# Patient Record
Sex: FEMALE | Race: BLACK OR AFRICAN AMERICAN | NOT HISPANIC OR LATINO
[De-identification: names, ages, dates, MRNs, and addresses within clinical notes are randomized per-mention and may not be internally consistent; named-entity substitution may affect disease eponyms.]

---

## 2017-09-26 ENCOUNTER — OTHER (OUTPATIENT)
Age: 62
End: 2017-09-26

## 2017-09-27 ENCOUNTER — EMERGENCY (EMERGENCY)
Facility: HOSPITAL | Age: 62
LOS: 0 days | Discharge: HOME | End: 2017-09-27
Attending: EMERGENCY MEDICINE
Payer: COMMERCIAL

## 2017-09-27 VITALS
RESPIRATION RATE: 16 BRPM | DIASTOLIC BLOOD PRESSURE: 80 MMHG | HEART RATE: 104 BPM | HEIGHT: 65 IN | WEIGHT: 179.02 LBS | TEMPERATURE: 98 F | SYSTOLIC BLOOD PRESSURE: 141 MMHG | OXYGEN SATURATION: 98 %

## 2017-09-27 DIAGNOSIS — Z98.89 OTHER SPECIFIED POSTPROCEDURAL STATES: Chronic | ICD-10-CM

## 2017-09-27 DIAGNOSIS — Z90.710 ACQUIRED ABSENCE OF BOTH CERVIX AND UTERUS: Chronic | ICD-10-CM

## 2017-09-27 PROCEDURE — 99283 EMERGENCY DEPT VISIT LOW MDM: CPT

## 2017-09-27 PROCEDURE — 73130 X-RAY EXAM OF HAND: CPT | Mod: 26,LT

## 2017-09-27 RX ORDER — IBUPROFEN 200 MG
600 TABLET ORAL ONCE
Qty: 0 | Refills: 0 | Status: COMPLETED | OUTPATIENT
Start: 2017-09-27 | End: 2017-09-27

## 2017-09-27 RX ORDER — IBUPROFEN 200 MG
1 TABLET ORAL
Qty: 20 | Refills: 0
Start: 2017-09-27 | End: 2017-10-02

## 2017-09-27 RX ADMIN — Medication 600 MILLIGRAM(S): at 19:38

## 2017-09-27 NOTE — ED PROVIDER NOTE - MUSCULOSKELETAL, MLM
+left dorsal hand tenderness and edema overlying 4th and 5th metatcarpals, good ROM of wrist, no snuffbox tenderness

## 2017-09-27 NOTE — ED PROVIDER NOTE - MEDICAL DECISION MAKING DETAILS
Patient presents with left hand pain after injury today, significant edema on exam, plan for xray, nsaids

## 2017-09-27 NOTE — ED ADULT NURSE NOTE - OBJECTIVE STATEMENT
61 y o female c/o L hand pain/injury c swelling related to s/p fall today, "I was running for the bus and fell this morning.: denies head loc.

## 2017-09-27 NOTE — ED PROVIDER NOTE - OBJECTIVE STATEMENT
61F here with left hand injury sustained after a fall on outstretched hand today, now with swelling and pain overlying 4th and 5th metacarpals. No numbness or tingling. No weakness. No head injury or other injury. No wrist pain. She is right handed.

## 2017-09-27 NOTE — ED PROVIDER NOTE - ATTENDING CONTRIBUTION TO CARE
Patient evaluated and seen with PA agree with above history and physical - pt examined and seen by me personally - findings as seen: pt with left hand 4th and 5th MCP area swelling with tenderness, s/p fall placed in Volar splint after noted normal XR but with tenderness to area with swelling. To follow up hand surgery.

## 2017-09-28 ENCOUNTER — APPOINTMENT (OUTPATIENT)
Dept: PULMONOLOGY | Facility: CLINIC | Age: 62
End: 2017-09-28
Payer: COMMERCIAL

## 2017-09-28 VITALS
RESPIRATION RATE: 16 BRPM | HEART RATE: 100 BPM | HEIGHT: 63 IN | OXYGEN SATURATION: 96 % | BODY MASS INDEX: 30.83 KG/M2 | DIASTOLIC BLOOD PRESSURE: 70 MMHG | WEIGHT: 174 LBS | SYSTOLIC BLOOD PRESSURE: 120 MMHG | TEMPERATURE: 97.9 F

## 2017-09-28 DIAGNOSIS — Z79.1 LONG TERM (CURRENT) USE OF NON-STEROIDAL ANTI-INFLAMMATORIES (NSAID): ICD-10-CM
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
## 2017-09-28 DIAGNOSIS — B20 HUMAN IMMUNODEFICIENCY VIRUS [HIV] DISEASE: ICD-10-CM

## 2017-09-28 DIAGNOSIS — S60.922A UNSPECIFIED SUPERFICIAL INJURY OF LEFT HAND, INITIAL ENCOUNTER: ICD-10-CM

## 2017-09-28 DIAGNOSIS — M79.642 PAIN IN LEFT HAND: ICD-10-CM

## 2017-09-28 DIAGNOSIS — Z85.3 PERSONAL HISTORY OF MALIGNANT NEOPLASM OF BREAST: ICD-10-CM
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
## 2017-09-28 PROCEDURE — 94729 DIFFUSING CAPACITY: CPT

## 2017-09-28 PROCEDURE — 99203 OFFICE O/P NEW LOW 30 MIN: CPT | Mod: 25

## 2017-09-28 PROCEDURE — ZZZZZ: CPT

## 2017-09-28 PROCEDURE — 94726 PLETHYSMOGRAPHY LUNG VOLUMES: CPT

## 2017-09-28 PROCEDURE — 94060 EVALUATION OF WHEEZING: CPT

## 2017-09-28 RX ORDER — VALSARTAN 40 MG/1
TABLET, COATED ORAL
Refills: 0 | Status: ACTIVE | COMMUNITY

## 2017-09-28 RX ORDER — OMEPRAZOLE 20 MG/1
20 CAPSULE, DELAYED RELEASE ORAL DAILY
Qty: 30 | Refills: 2 | Status: DISCONTINUED | COMMUNITY
Start: 2017-09-28 | End: 2017-09-28

## 2017-09-28 RX ORDER — HYDROCHLOROTHIAZIDE 12.5 MG/1
TABLET ORAL
Refills: 0 | Status: ACTIVE | COMMUNITY

## 2017-09-28 RX ORDER — EMTRICITABINE, RILPIVIRINE HYDROCHLORIDE, AND TENOFOVIR ALAFENAMIDE 200; 25; 25 MG/1; MG/1; MG/1
TABLET ORAL
Refills: 0 | Status: ACTIVE | COMMUNITY

## 2017-11-09 ENCOUNTER — APPOINTMENT (OUTPATIENT)
Dept: PULMONOLOGY | Facility: CLINIC | Age: 62
End: 2017-11-09
Payer: COMMERCIAL

## 2017-11-09 VITALS
WEIGHT: 174 LBS | OXYGEN SATURATION: 93 % | HEART RATE: 95 BPM | BODY MASS INDEX: 29.71 KG/M2 | TEMPERATURE: 98 F | RESPIRATION RATE: 16 BRPM | SYSTOLIC BLOOD PRESSURE: 122 MMHG | DIASTOLIC BLOOD PRESSURE: 70 MMHG | HEIGHT: 64 IN

## 2017-11-09 PROCEDURE — 99213 OFFICE O/P EST LOW 20 MIN: CPT

## 2017-11-15 ENCOUNTER — RX RENEWAL (OUTPATIENT)
Age: 62
End: 2017-11-15

## 2017-11-16 ENCOUNTER — APPOINTMENT (OUTPATIENT)
Dept: PULMONOLOGY | Facility: CLINIC | Age: 62
End: 2017-11-16
Payer: COMMERCIAL

## 2017-11-16 ENCOUNTER — MEDICATION RENEWAL (OUTPATIENT)
Age: 62
End: 2017-11-16

## 2017-11-16 VITALS
TEMPERATURE: 97.7 F | SYSTOLIC BLOOD PRESSURE: 120 MMHG | HEART RATE: 94 BPM | RESPIRATION RATE: 18 BRPM | HEIGHT: 64 IN | OXYGEN SATURATION: 98 % | WEIGHT: 181 LBS | DIASTOLIC BLOOD PRESSURE: 80 MMHG | BODY MASS INDEX: 30.9 KG/M2

## 2017-11-16 PROCEDURE — 99213 OFFICE O/P EST LOW 20 MIN: CPT

## 2017-11-20 ENCOUNTER — MEDICATION RENEWAL (OUTPATIENT)
Age: 62
End: 2017-11-20

## 2017-12-13 ENCOUNTER — MEDICATION RENEWAL (OUTPATIENT)
Age: 62
End: 2017-12-13

## 2018-01-09 ENCOUNTER — MEDICATION RENEWAL (OUTPATIENT)
Age: 63
End: 2018-01-09

## 2018-01-09 RX ORDER — FLUTICASONE PROPIONATE 50 UG/1
50 SPRAY, METERED NASAL TWICE DAILY
Qty: 1 | Refills: 3 | Status: DISCONTINUED | COMMUNITY
Start: 2017-09-28 | End: 2018-01-09

## 2018-02-08 ENCOUNTER — APPOINTMENT (OUTPATIENT)
Dept: PULMONOLOGY | Facility: CLINIC | Age: 63
End: 2018-02-08
Payer: COMMERCIAL

## 2018-02-08 ENCOUNTER — LABORATORY RESULT (OUTPATIENT)
Age: 63
End: 2018-02-08

## 2018-02-08 VITALS
HEART RATE: 103 BPM | SYSTOLIC BLOOD PRESSURE: 110 MMHG | WEIGHT: 178 LBS | DIASTOLIC BLOOD PRESSURE: 70 MMHG | BODY MASS INDEX: 30.39 KG/M2 | RESPIRATION RATE: 16 BRPM | TEMPERATURE: 97.4 F | OXYGEN SATURATION: 96 % | HEIGHT: 64 IN

## 2018-02-08 PROCEDURE — 99214 OFFICE O/P EST MOD 30 MIN: CPT

## 2018-02-09 LAB
BASOPHILS # BLD AUTO: 0.02 K/UL
BASOPHILS NFR BLD AUTO: 0.3 %
EOSINOPHIL # BLD AUTO: 0.03 K/UL
EOSINOPHIL NFR BLD AUTO: 0.4 %
HCT VFR BLD CALC: 39.2 %
HGB BLD-MCNC: 13.1 G/DL
IGE SER-MCNC: 127 IU/ML
IMM GRANULOCYTES NFR BLD AUTO: 0.3 %
LYMPHOCYTES # BLD AUTO: 1.09 K/UL
LYMPHOCYTES NFR BLD AUTO: 16.2 %
MAN DIFF?: NORMAL
MCHC RBC-ENTMCNC: 31 PG
MCHC RBC-ENTMCNC: 33.4 GM/DL
MCV RBC AUTO: 92.9 FL
MONOCYTES # BLD AUTO: 0.38 K/UL
MONOCYTES NFR BLD AUTO: 5.7 %
NEUTROPHILS # BLD AUTO: 5.18 K/UL
NEUTROPHILS NFR BLD AUTO: 77.1 %
PLATELET # BLD AUTO: 323 K/UL
RBC # BLD: 4.22 M/UL
RBC # FLD: 16.2 %
WBC # FLD AUTO: 6.72 K/UL

## 2018-02-14 LAB
A ALTERNATA IGE QN: <0.1 KUA/L
A FUMIGATUS IGE QN: <0.1 KUA/L
BERMUDA GRASS IGE QN: <0.1 KUA/L
BOXELDER IGE QN: 0.57 KUA/L
C HERBARUM IGE QN: <0.1 KUA/L
CALIF WALNUT IGE QN: <0.1 KUA/L
CAT DANDER IGE QN: <0.1 KUA/L
CMN PIGWEED IGE QN: <0.1 KUA/L
COMMON RAGWEED IGE QN: <0.1 KUA/L
COTTONWOOD IGE QN: <0.1 KUA/L
D FARINAE IGE QN: 0.98 KUA/L
D PTERONYSS IGE QN: 0.51 KUA/L
DEPRECATED A ALTERNATA IGE RAST QL: 0
DEPRECATED A FUMIGATUS IGE RAST QL: 0
DEPRECATED BERMUDA GRASS IGE RAST QL: 0
DEPRECATED BOXELDER IGE RAST QL: ABNORMAL
DEPRECATED C HERBARUM IGE RAST QL: 0
DEPRECATED CAT DANDER IGE RAST QL: 0
DEPRECATED COMMON PIGWEED IGE RAST QL: 0
DEPRECATED COMMON RAGWEED IGE RAST QL: 0
DEPRECATED COTTONWOOD IGE RAST QL: 0
DEPRECATED D FARINAE IGE RAST QL: ABNORMAL
DEPRECATED D PTERONYSS IGE RAST QL: ABNORMAL
DEPRECATED DOG DANDER IGE RAST QL: 0
DEPRECATED GOOSEFOOT IGE RAST QL: 0
DEPRECATED LONDON PLANE IGE RAST QL: 0
DEPRECATED MUGWORT IGE RAST QL: 0
DEPRECATED P NOTATUM IGE RAST QL: 0
DEPRECATED RED CEDAR IGE RAST QL: 0
DEPRECATED ROACH IGE RAST QL: 0
DEPRECATED SHEEP SORREL IGE RAST QL: 0
DEPRECATED SILVER BIRCH IGE RAST QL: 0
DEPRECATED TIMOTHY IGE RAST QL: 0
DEPRECATED WHITE ASH IGE RAST QL: 0
DEPRECATED WHITE OAK IGE RAST QL: 0
DOG DANDER IGE QN: <0.1 KUA/L
GOOSEFOOT IGE QN: <0.1 KUA/L
LONDON PLANE IGE QN: <0.1 KUA/L
MUGWORT IGE QN: <0.1 KUA/L
MULBERRY (T70) CLASS: 0
MULBERRY (T70) CONC: <0.1 KUA/L
P NOTATUM IGE QN: <0.1 KUA/L
RED CEDAR IGE QN: <0.1 KUA/L
ROACH IGE QN: <0.1 KUA/L
SHEEP SORREL IGE QN: <0.1 KUA/L
SILVER BIRCH IGE QN: <0.1 KUA/L
TIMOTHY IGE QN: <0.1 KUA/L
TREE ALLERG MIX1 IGE QL: 0
WHITE ASH IGE QN: <0.1 KUA/L
WHITE ELM IGE QN: 0
WHITE ELM IGE QN: <0.1 KUA/L
WHITE OAK IGE QN: <0.1 KUA/L

## 2018-03-15 ENCOUNTER — APPOINTMENT (OUTPATIENT)
Dept: PULMONOLOGY | Facility: CLINIC | Age: 63
End: 2018-03-15
Payer: COMMERCIAL

## 2018-03-15 VITALS
RESPIRATION RATE: 16 BRPM | DIASTOLIC BLOOD PRESSURE: 76 MMHG | TEMPERATURE: 99.4 F | SYSTOLIC BLOOD PRESSURE: 120 MMHG | HEIGHT: 64 IN | WEIGHT: 183 LBS | HEART RATE: 96 BPM | BODY MASS INDEX: 31.24 KG/M2

## 2018-03-15 PROCEDURE — 99213 OFFICE O/P EST LOW 20 MIN: CPT

## 2018-03-15 RX ORDER — BECLOMETHASONE DIPROPIONATE 80 UG/1
80 AEROSOL, METERED RESPIRATORY (INHALATION) TWICE DAILY
Qty: 1 | Refills: 6 | Status: ACTIVE | COMMUNITY
Start: 2018-03-15 | End: 1900-01-01

## 2018-04-19 ENCOUNTER — APPOINTMENT (OUTPATIENT)
Dept: PULMONOLOGY | Facility: CLINIC | Age: 63
End: 2018-04-19
Payer: COMMERCIAL

## 2018-04-19 VITALS
SYSTOLIC BLOOD PRESSURE: 116 MMHG | HEART RATE: 77 BPM | DIASTOLIC BLOOD PRESSURE: 70 MMHG | WEIGHT: 181 LBS | RESPIRATION RATE: 16 BRPM | HEIGHT: 64 IN | BODY MASS INDEX: 30.9 KG/M2 | TEMPERATURE: 98.4 F

## 2018-04-19 PROCEDURE — 99213 OFFICE O/P EST LOW 20 MIN: CPT | Mod: GC

## 2018-05-10 ENCOUNTER — MEDICATION RENEWAL (OUTPATIENT)
Age: 63
End: 2018-05-10

## 2018-05-10 ENCOUNTER — RX RENEWAL (OUTPATIENT)
Age: 63
End: 2018-05-10

## 2018-05-24 ENCOUNTER — APPOINTMENT (OUTPATIENT)
Dept: PULMONOLOGY | Facility: CLINIC | Age: 63
End: 2018-05-24
Payer: COMMERCIAL

## 2018-05-24 VITALS
HEIGHT: 64 IN | HEART RATE: 96 BPM | SYSTOLIC BLOOD PRESSURE: 126 MMHG | OXYGEN SATURATION: 95 % | RESPIRATION RATE: 15 BRPM | WEIGHT: 179 LBS | TEMPERATURE: 97.9 F | DIASTOLIC BLOOD PRESSURE: 70 MMHG | BODY MASS INDEX: 30.56 KG/M2

## 2018-05-24 PROCEDURE — 99213 OFFICE O/P EST LOW 20 MIN: CPT

## 2018-08-02 ENCOUNTER — APPOINTMENT (OUTPATIENT)
Dept: PULMONOLOGY | Facility: CLINIC | Age: 63
End: 2018-08-02
Payer: COMMERCIAL

## 2018-08-02 VITALS
TEMPERATURE: 98 F | RESPIRATION RATE: 16 BRPM | DIASTOLIC BLOOD PRESSURE: 70 MMHG | SYSTOLIC BLOOD PRESSURE: 110 MMHG | WEIGHT: 171 LBS | BODY MASS INDEX: 29.19 KG/M2 | HEART RATE: 88 BPM | HEIGHT: 64 IN | OXYGEN SATURATION: 96 %

## 2018-08-02 PROCEDURE — 99214 OFFICE O/P EST MOD 30 MIN: CPT

## 2018-08-02 RX ORDER — PREDNISONE 10 MG/1
10 TABLET ORAL
Qty: 1 | Refills: 1 | Status: DISCONTINUED | COMMUNITY
Start: 2018-02-08 | End: 2018-08-02

## 2018-08-02 RX ORDER — PREDNISONE 10 MG/1
10 TABLET ORAL
Qty: 50 | Refills: 3 | Status: DISCONTINUED | COMMUNITY
Start: 2017-11-09 | End: 2018-08-02

## 2018-08-02 RX ORDER — MOMETASONE 50 UG/1
50 SPRAY, METERED NASAL DAILY
Qty: 1 | Refills: 2 | Status: DISCONTINUED | COMMUNITY
Start: 2018-01-09 | End: 2018-08-02

## 2018-08-03 ENCOUNTER — OTHER (OUTPATIENT)
Age: 63
End: 2018-08-03

## 2018-08-03 LAB
BASOPHILS # BLD AUTO: 0.03 K/UL
BASOPHILS NFR BLD AUTO: 0.4 %
EOSINOPHIL # BLD AUTO: 0.86 K/UL
EOSINOPHIL NFR BLD AUTO: 11.2 %
HCT VFR BLD CALC: 41.2 %
HGB BLD-MCNC: 12.9 G/DL
IGE SER-MCNC: 197 IU/ML
IMM GRANULOCYTES NFR BLD AUTO: 0.3 %
LYMPHOCYTES # BLD AUTO: 1.34 K/UL
LYMPHOCYTES NFR BLD AUTO: 17.4 %
MAN DIFF?: NORMAL
MCHC RBC-ENTMCNC: 29.3 PG
MCHC RBC-ENTMCNC: 31.3 GM/DL
MCV RBC AUTO: 93.6 FL
MONOCYTES # BLD AUTO: 0.76 K/UL
MONOCYTES NFR BLD AUTO: 9.9 %
NEUTROPHILS # BLD AUTO: 4.7 K/UL
NEUTROPHILS NFR BLD AUTO: 60.8 %
PLATELET # BLD AUTO: 326 K/UL
RBC # BLD: 4.4 M/UL
RBC # FLD: 15.2 %
WBC # FLD AUTO: 7.71 K/UL

## 2018-08-16 ENCOUNTER — APPOINTMENT (OUTPATIENT)
Dept: PULMONOLOGY | Facility: CLINIC | Age: 63
End: 2018-08-16

## 2018-08-30 ENCOUNTER — APPOINTMENT (OUTPATIENT)
Dept: PULMONOLOGY | Facility: CLINIC | Age: 63
End: 2018-08-30
Payer: COMMERCIAL

## 2018-08-30 VITALS
BODY MASS INDEX: 30.22 KG/M2 | SYSTOLIC BLOOD PRESSURE: 120 MMHG | WEIGHT: 177 LBS | HEART RATE: 87 BPM | DIASTOLIC BLOOD PRESSURE: 84 MMHG | HEIGHT: 64 IN | RESPIRATION RATE: 16 BRPM | TEMPERATURE: 98 F

## 2018-08-30 PROCEDURE — 99214 OFFICE O/P EST MOD 30 MIN: CPT

## 2018-08-30 RX ORDER — FAMOTIDINE 20 MG/1
20 TABLET, FILM COATED ORAL
Qty: 1 | Refills: 0 | Status: DISCONTINUED | COMMUNITY
Start: 2017-11-15 | End: 2018-08-30

## 2018-08-30 RX ORDER — FAMOTIDINE 20 MG/1
20 TABLET, FILM COATED ORAL DAILY
Qty: 30 | Refills: 2 | Status: DISCONTINUED | COMMUNITY
Start: 2017-09-29 | End: 2018-08-30

## 2018-08-30 RX ORDER — BECLOMETHASONE DIPROPIONATE HFA 80 UG/1
80 AEROSOL, METERED RESPIRATORY (INHALATION) TWICE DAILY
Qty: 1 | Refills: 6 | Status: ACTIVE | COMMUNITY
Start: 2018-08-30 | End: 1900-01-01

## 2018-09-12 ENCOUNTER — APPOINTMENT (OUTPATIENT)
Dept: UROGYNECOLOGY | Facility: CLINIC | Age: 63
End: 2018-09-12
Payer: COMMERCIAL

## 2018-09-12 VITALS
HEIGHT: 65 IN | DIASTOLIC BLOOD PRESSURE: 80 MMHG | BODY MASS INDEX: 28.66 KG/M2 | WEIGHT: 172 LBS | SYSTOLIC BLOOD PRESSURE: 122 MMHG

## 2018-09-12 PROCEDURE — 99244 OFF/OP CNSLTJ NEW/EST MOD 40: CPT | Mod: 25

## 2018-09-12 PROCEDURE — 51701 INSERT BLADDER CATHETER: CPT

## 2018-09-13 ENCOUNTER — RESULT REVIEW (OUTPATIENT)
Age: 63
End: 2018-09-13

## 2018-09-13 LAB
APPEARANCE: CLEAR
BACTERIA: NEGATIVE
BILIRUB UR QL STRIP: NORMAL
BILIRUBIN URINE: NEGATIVE
BLOOD URINE: ABNORMAL
CLARITY UR: CLEAR
COLLECTION METHOD: NORMAL
COLOR: YELLOW
GLUCOSE QUALITATIVE U: NEGATIVE MG/DL
GLUCOSE UR-MCNC: NORMAL
HCG UR QL: 0.2 EU/DL
HGB UR QL STRIP.AUTO: NORMAL
KETONES UR-MCNC: NORMAL
KETONES URINE: NEGATIVE
LEUKOCYTE ESTERASE UR QL STRIP: NORMAL
LEUKOCYTE ESTERASE URINE: NEGATIVE
MICROSCOPIC-UA: NORMAL
NITRITE UR QL STRIP: NORMAL
NITRITE URINE: NEGATIVE
PH UR STRIP: 6.5
PH URINE: 7
PROT UR STRIP-MCNC: NORMAL
PROTEIN URINE: NEGATIVE MG/DL
RED BLOOD CELLS URINE: 1 /HPF
SP GR UR STRIP: 1.01
SPECIFIC GRAVITY URINE: 1.01
SQUAMOUS EPITHELIAL CELLS: 0 /HPF
UROBILINOGEN URINE: NEGATIVE MG/DL
WHITE BLOOD CELLS URINE: 0 /HPF

## 2018-09-14 ENCOUNTER — RESULT REVIEW (OUTPATIENT)
Age: 63
End: 2018-09-14

## 2018-09-14 LAB — BACTERIA UR CULT: NORMAL

## 2018-09-27 ENCOUNTER — MED ADMIN CHARGE (OUTPATIENT)
Age: 63
End: 2018-09-27

## 2018-09-27 ENCOUNTER — APPOINTMENT (OUTPATIENT)
Dept: PULMONOLOGY | Facility: CLINIC | Age: 63
End: 2018-09-27
Payer: COMMERCIAL

## 2018-09-27 VITALS
RESPIRATION RATE: 16 BRPM | TEMPERATURE: 98 F | WEIGHT: 170 LBS | BODY MASS INDEX: 28.32 KG/M2 | OXYGEN SATURATION: 96 % | DIASTOLIC BLOOD PRESSURE: 92 MMHG | HEIGHT: 65 IN | SYSTOLIC BLOOD PRESSURE: 153 MMHG | HEART RATE: 100 BPM

## 2018-09-27 PROCEDURE — 94640 AIRWAY INHALATION TREATMENT: CPT

## 2018-09-27 PROCEDURE — 99214 OFFICE O/P EST MOD 30 MIN: CPT | Mod: 25

## 2018-09-27 PROCEDURE — 96372 THER/PROPH/DIAG INJ SC/IM: CPT

## 2018-09-27 RX ORDER — ALBUTEROL SULFATE 2.5 MG/3ML
(2.5 MG/3ML) SOLUTION RESPIRATORY (INHALATION)
Qty: 0 | Refills: 0 | Status: COMPLETED | OUTPATIENT
Start: 2018-09-27

## 2018-09-27 RX ADMIN — ALBUTEROL SULFATE 1 0.083%: 2.5 SOLUTION RESPIRATORY (INHALATION) at 00:00

## 2018-10-04 ENCOUNTER — OTHER (OUTPATIENT)
Age: 63
End: 2018-10-04

## 2018-10-04 ENCOUNTER — APPOINTMENT (OUTPATIENT)
Dept: PULMONOLOGY | Facility: CLINIC | Age: 63
End: 2018-10-04

## 2018-10-23 ENCOUNTER — MEDICATION RENEWAL (OUTPATIENT)
Age: 63
End: 2018-10-23

## 2018-10-23 ENCOUNTER — OTHER (OUTPATIENT)
Age: 63
End: 2018-10-23

## 2018-10-24 ENCOUNTER — APPOINTMENT (OUTPATIENT)
Dept: UROGYNECOLOGY | Facility: CLINIC | Age: 63
End: 2018-10-24
Payer: COMMERCIAL

## 2018-10-24 PROCEDURE — A4562: CPT

## 2018-10-24 PROCEDURE — 99214 OFFICE O/P EST MOD 30 MIN: CPT

## 2018-11-01 ENCOUNTER — APPOINTMENT (OUTPATIENT)
Dept: PULMONOLOGY | Facility: CLINIC | Age: 63
End: 2018-11-01
Payer: COMMERCIAL

## 2018-11-01 ENCOUNTER — LABORATORY RESULT (OUTPATIENT)
Age: 63
End: 2018-11-01

## 2018-11-01 VITALS
HEART RATE: 88 BPM | TEMPERATURE: 98.3 F | HEIGHT: 65 IN | WEIGHT: 175 LBS | OXYGEN SATURATION: 97 % | DIASTOLIC BLOOD PRESSURE: 82 MMHG | SYSTOLIC BLOOD PRESSURE: 136 MMHG | RESPIRATION RATE: 16 BRPM | BODY MASS INDEX: 29.16 KG/M2

## 2018-11-01 PROCEDURE — ZZZZZ: CPT

## 2018-11-01 PROCEDURE — 94729 DIFFUSING CAPACITY: CPT

## 2018-11-01 PROCEDURE — 99214 OFFICE O/P EST MOD 30 MIN: CPT | Mod: 25

## 2018-11-01 PROCEDURE — 94010 BREATHING CAPACITY TEST: CPT

## 2018-11-01 PROCEDURE — 94726 PLETHYSMOGRAPHY LUNG VOLUMES: CPT

## 2018-11-06 LAB
A ALTERNATA IGE QN: <0.1 KUA/L
A FUMIGATUS IGE QN: <0.1 KUA/L
C ALBICANS IGE QN: <0.1 KUA/L
C HERBARUM IGE QN: <0.1 KUA/L
CAT DANDER IGE QN: <0.1 KUA/L
COMMON RAGWEED IGE QN: <0.1 KUA/L
D FARINAE IGE QN: 2.95 KUA/L
D PTERONYSS IGE QN: 1.33 KUA/L
DEPRECATED A ALTERNATA IGE RAST QL: 0
DEPRECATED A FUMIGATUS IGE RAST QL: 0
DEPRECATED C ALBICANS IGE RAST QL: 0
DEPRECATED C HERBARUM IGE RAST QL: 0
DEPRECATED CAT DANDER IGE RAST QL: 0
DEPRECATED COMMON RAGWEED IGE RAST QL: 0
DEPRECATED D FARINAE IGE RAST QL: ABNORMAL
DEPRECATED D PTERONYSS IGE RAST QL: ABNORMAL
DEPRECATED DOG DANDER IGE RAST QL: 0
DEPRECATED M RACEMOSUS IGE RAST QL: 0
DEPRECATED ROACH IGE RAST QL: 0
DEPRECATED TIMOTHY IGE RAST QL: 0
DEPRECATED WHITE OAK IGE RAST QL: 0
DOG DANDER IGE QN: <0.1 KUA/L
M RACEMOSUS IGE QN: <0.1 KUA/L
ROACH IGE QN: <0.1 KUA/L
TIMOTHY IGE QN: <0.1 KUA/L
WHITE OAK IGE QN: <0.1 KUA/L

## 2018-11-14 ENCOUNTER — APPOINTMENT (OUTPATIENT)
Dept: UROGYNECOLOGY | Facility: CLINIC | Age: 63
End: 2018-11-14
Payer: COMMERCIAL

## 2018-11-14 DIAGNOSIS — R33.9 RETENTION OF URINE, UNSPECIFIED: ICD-10-CM

## 2018-11-14 PROCEDURE — 99214 OFFICE O/P EST MOD 30 MIN: CPT

## 2018-11-14 PROCEDURE — A4562: CPT

## 2018-12-12 ENCOUNTER — APPOINTMENT (OUTPATIENT)
Dept: UROGYNECOLOGY | Facility: CLINIC | Age: 63
End: 2018-12-12
Payer: COMMERCIAL

## 2018-12-12 PROCEDURE — 99214 OFFICE O/P EST MOD 30 MIN: CPT

## 2018-12-12 PROCEDURE — A4562: CPT

## 2019-01-02 ENCOUNTER — APPOINTMENT (OUTPATIENT)
Dept: UROGYNECOLOGY | Facility: CLINIC | Age: 64
End: 2019-01-02
Payer: COMMERCIAL

## 2019-01-02 PROCEDURE — 99214 OFFICE O/P EST MOD 30 MIN: CPT

## 2019-01-10 ENCOUNTER — APPOINTMENT (OUTPATIENT)
Dept: PULMONOLOGY | Facility: CLINIC | Age: 64
End: 2019-01-10
Payer: COMMERCIAL

## 2019-01-10 VITALS
OXYGEN SATURATION: 97 % | WEIGHT: 171 LBS | TEMPERATURE: 97.6 F | HEART RATE: 86 BPM | DIASTOLIC BLOOD PRESSURE: 89 MMHG | HEIGHT: 65 IN | RESPIRATION RATE: 16 BRPM | SYSTOLIC BLOOD PRESSURE: 152 MMHG | BODY MASS INDEX: 28.49 KG/M2

## 2019-01-10 PROCEDURE — 99213 OFFICE O/P EST LOW 20 MIN: CPT

## 2019-01-10 NOTE — PHYSICAL EXAM
[General Appearance - In No Acute Distress] : no acute distress [Normal Conjunctiva] : the conjunctiva exhibited no abnormalities [Heart Rate And Rhythm] : heart rate and rhythm were normal [Heart Sounds] : normal S1 and S2 [] : no respiratory distress [Respiration, Rhythm And Depth] : normal respiratory rhythm and effort [Auscultation Breath Sounds / Voice Sounds] : lungs were clear to auscultation bilaterally [Nail Clubbing] : no clubbing of the fingernails [Cyanosis, Localized] : no localized cyanosis [No Focal Deficits] : no focal deficits

## 2019-01-10 NOTE — REVIEW OF SYSTEMS
[Nasal Congestion] : nasal congestion [As Noted in HPI] : as noted in HPI [Hypertension] : ~T hypertension [Negative] : Constitutional

## 2019-01-10 NOTE — HISTORY OF PRESENT ILLNESS
[FreeTextEntry1] : 63-year-old female with severe persistent asthma. Patient has been recently controlled with daily breo, inhaled albuterol and prednisone. She was supposed to be on 10 mg of prednisone per day but she is using it intermittently despite our lengthy discussions. Despite this she has not had any attacks in the last 6-8 weeks. She reports that she uses prednisone occasionally when she feels more short of breath. She occasionally uses one tablet, sometimes 2 tablets. Currently she denies any dyspnea, wheezing or coughing.

## 2019-01-10 NOTE — ASSESSMENT
[FreeTextEntry1] : We have not heard from the pharmaceutical company/insurance company regarding Fasenra. I reinforced with the patient and how she needs to take the prednisone. I suggested that she take a half a tablet every day rather than fluctuating dose. She reports that her peak flows are normal. Hopefully we'll be able to continue to control her with the current regimen.

## 2019-01-31 ENCOUNTER — APPOINTMENT (OUTPATIENT)
Dept: ANTEPARTUM | Facility: CLINIC | Age: 64
End: 2019-01-31

## 2019-02-08 ENCOUNTER — RX CHANGE (OUTPATIENT)
Age: 64
End: 2019-02-08

## 2019-02-13 ENCOUNTER — APPOINTMENT (OUTPATIENT)
Dept: UROGYNECOLOGY | Facility: CLINIC | Age: 64
End: 2019-02-13
Payer: COMMERCIAL

## 2019-02-13 PROCEDURE — 99213 OFFICE O/P EST LOW 20 MIN: CPT

## 2019-02-14 NOTE — DISCUSSION/SUMMARY
[FreeTextEntry1] : Pt able to remove and insert pessary on her own.  she will RTO in 3 months or sooner if needed. Pt advised to perform self-care of pessary 1-2 times per week and leave pessary out at night. She agrees to call office with any problems/concerns.

## 2019-02-14 NOTE — PROCEDURE
[Good Fit] : fits well [Discharge] : there is vaginal discharge [Pessary Inserted] : inserted [Pessary Washed] : washed [H2O] : H2O [Mild] : mild bleeding [Refit] : refit is not needed [Erosion] : no evidence of erosion [Erythema] : no erythema [Infection] : no evidence of infection [FreeTextEntry1] : cube #5 [de-identified] : scant leukorrhea [de-identified] : with removal [FreeTextEntry8] : routine davis-care. pt able to remove and insert pessary on her own.

## 2019-02-14 NOTE — PHYSICAL EXAM
[No Acute Distress] : in no acute distress [Well developed] : well developed [Well Nourished] : ~L well nourished [Good Hygeine] : demonstrates good hygeine [Oriented x3] : oriented to person, place, and time [Normal Memory] : ~T memory was ~L unimpaired [Normal Mood/Affect] : mood and affect are normal [Warm and Dry] : was warm and dry to touch [Normal Gait] : gait was normal [Labia Majora] : were normal [Labia Minora] : were normal [Normal] : was normal [Normal Appearance] : general appearance was normal [Atrophy] : atrophy [No Bleeding] : there was no active vaginal bleeding [Anxiety] : patient is not anxious [Tenderness] : ~T no ~M abdominal tenderness observed [Distended] : not distended

## 2019-02-14 NOTE — HISTORY OF PRESENT ILLNESS
[FreeTextEntry1] : Pt presents to office for f/u of prolapse. Denies any issues with pessary. She would like to learn how to remove and insert pessary on her own.

## 2019-02-20 ENCOUNTER — MOBILE ON CALL (OUTPATIENT)
Age: 64
End: 2019-02-20

## 2019-03-13 ENCOUNTER — RX RENEWAL (OUTPATIENT)
Age: 64
End: 2019-03-13

## 2019-03-20 RX ORDER — BENRALIZUMAB 30 MG/ML
30 INJECTION, SOLUTION SUBCUTANEOUS
Qty: 1 | Refills: 7 | Status: DISCONTINUED | COMMUNITY
Start: 2019-02-08 | End: 2019-03-20

## 2019-03-28 ENCOUNTER — APPOINTMENT (OUTPATIENT)
Dept: PULMONOLOGY | Facility: CLINIC | Age: 64
End: 2019-03-28
Payer: COMMERCIAL

## 2019-03-28 VITALS
OXYGEN SATURATION: 96 % | SYSTOLIC BLOOD PRESSURE: 142 MMHG | RESPIRATION RATE: 16 BRPM | DIASTOLIC BLOOD PRESSURE: 88 MMHG | HEART RATE: 93 BPM | HEIGHT: 65 IN | WEIGHT: 174 LBS | TEMPERATURE: 98 F | BODY MASS INDEX: 28.99 KG/M2

## 2019-03-28 PROCEDURE — 99214 OFFICE O/P EST MOD 30 MIN: CPT

## 2019-03-28 NOTE — ASSESSMENT
[FreeTextEntry1] : 62yo female patient with severe persistent asthma.  Patient appears well today and has remained free of exacerbations since we have maintained her on a small dose of prednisone.  She is to c/w her Breo, ProAir and prednisone until we can start Nucala, as she has had issues of going to the hospital upon having an asthma flare-up.  Patient will call her insurance company to determine what percent is covered by them and what percent is owed, as I do not have that information.  Went over the instructions on activating her co-pay assistance card from Nucala.  I will call her son tomorrow to speak with him regarding the plan for obtaining her Nucala, and will also provide them with the pharmacy phone number to call to consent for delivery.\par Her BP has been well controlled despite prednisone

## 2019-03-28 NOTE — HISTORY OF PRESENT ILLNESS
[FreeTextEntry1] : 64yo female patient with severe persistent asthma.  Patient feels well today, denying any asthma symptoms.  She has been maintained on Breo, ProAir, and prednisone 10mg.  Last week she felt more chest tightness with wheezing, but increased her ProAir to four times daily with resolution of symptoms.  Monitors her peak flows daily.  She has been approved for Nucala, but the specialty pharmacy has been attempting to call her to consent for office delivery.  She has questions about co-payments and assistance.

## 2019-03-28 NOTE — REVIEW OF SYSTEMS
[Hypertension] : ~T hypertension [As Noted in HPI] : as noted in HPI [Negative] : Respiratory [Chest Discomfort] : no chest discomfort [Edema] : ~T edema was not present

## 2019-03-28 NOTE — PHYSICAL EXAM
[General Appearance - In No Acute Distress] : no acute distress [Normal Conjunctiva] : the conjunctiva exhibited no abnormalities [Heart Rate And Rhythm] : heart rate and rhythm were normal [Heart Sounds] : normal S1 and S2 [] : no respiratory distress [Respiration, Rhythm And Depth] : normal respiratory rhythm and effort [Auscultation Breath Sounds / Voice Sounds] : lungs were clear to auscultation bilaterally [Nail Clubbing] : no clubbing of the fingernails [Cyanosis, Localized] : no localized cyanosis [No Focal Deficits] : no focal deficits [Normal Appearance] : normal appearance [Normal Oropharynx] : normal oropharynx [Abnormal Walk] : normal gait [Affect] : the affect was normal [Mood] : the mood was normal [FreeTextEntry2] : no edema

## 2019-04-11 ENCOUNTER — RX RENEWAL (OUTPATIENT)
Age: 64
End: 2019-04-11

## 2019-05-14 ENCOUNTER — RX CHANGE (OUTPATIENT)
Age: 64
End: 2019-05-14

## 2019-05-14 ENCOUNTER — APPOINTMENT (OUTPATIENT)
Dept: UROGYNECOLOGY | Facility: CLINIC | Age: 64
End: 2019-05-14
Payer: COMMERCIAL

## 2019-05-14 PROCEDURE — 99213 OFFICE O/P EST LOW 20 MIN: CPT

## 2019-05-14 RX ORDER — EPINEPHRINE 0.3 MG/.3ML
0.3 INJECTION INTRAMUSCULAR
Qty: 1 | Refills: 0 | Status: DISCONTINUED | COMMUNITY
Start: 2019-04-03 | End: 2019-05-14

## 2019-05-14 NOTE — HISTORY OF PRESENT ILLNESS
[FreeTextEntry1] : Pt presents to office for f/u of prolapse.  Uses cube pessary and performs self-care 1-2 times per week.  She leaves pessary out at night and inserts in morning.

## 2019-05-14 NOTE — DISCUSSION/SUMMARY
[FreeTextEntry1] : Pt doing well with pessary. She will RTO in 6 months or sooner if needed for f/u of prolapse. She agrees to call office with any problems/concerns.

## 2019-05-14 NOTE — PHYSICAL EXAM
[No Acute Distress] : in no acute distress [Well developed] : well developed [Well Nourished] : ~L well nourished [Good Hygeine] : demonstrates good hygeine [Oriented x3] : oriented to person, place, and time [Normal Memory] : ~T memory was ~L unimpaired [Normal Mood/Affect] : mood and affect are normal [Warm and Dry] : was warm and dry to touch [Normal Gait] : gait was normal [Labia Majora] : were normal [Normal] : was normal [Labia Minora] : were normal [Normal Appearance] : general appearance was normal [No Bleeding] : there was no active vaginal bleeding [Anxiety] : patient is not anxious [Distended] : not distended [Tenderness] : ~T no ~M abdominal tenderness observed

## 2019-05-14 NOTE — PROCEDURE
[Good Fit] : fits well [Discharge] : there is vaginal discharge [H2O] : H2O [None] : no bleeding [Erythema] : no erythema [Refit] : refit is not needed [Erosion] : no evidence of erosion [Infection] : no evidence of infection [de-identified] : scant leukorrhea [FreeTextEntry1] : cube #5 [FreeTextEntry8] : routine davis-care, self care 2x weekly

## 2019-05-15 ENCOUNTER — APPOINTMENT (OUTPATIENT)
Dept: PULMONOLOGY | Facility: CLINIC | Age: 64
End: 2019-05-15
Payer: COMMERCIAL

## 2019-05-15 ENCOUNTER — EMERGENCY (EMERGENCY)
Facility: HOSPITAL | Age: 64
LOS: 1 days | Discharge: HOME | End: 2019-05-15
Attending: EMERGENCY MEDICINE | Admitting: EMERGENCY MEDICINE
Payer: COMMERCIAL

## 2019-05-15 VITALS
RESPIRATION RATE: 15 BRPM | DIASTOLIC BLOOD PRESSURE: 85 MMHG | SYSTOLIC BLOOD PRESSURE: 133 MMHG | OXYGEN SATURATION: 90 % | BODY MASS INDEX: 29.16 KG/M2 | TEMPERATURE: 97.9 F | WEIGHT: 175 LBS | HEIGHT: 65 IN | HEART RATE: 116 BPM

## 2019-05-15 VITALS
RESPIRATION RATE: 18 BRPM | DIASTOLIC BLOOD PRESSURE: 61 MMHG | HEART RATE: 112 BPM | SYSTOLIC BLOOD PRESSURE: 120 MMHG | OXYGEN SATURATION: 98 %

## 2019-05-15 VITALS
TEMPERATURE: 99 F | RESPIRATION RATE: 18 BRPM | OXYGEN SATURATION: 99 % | SYSTOLIC BLOOD PRESSURE: 142 MMHG | HEART RATE: 87 BPM | DIASTOLIC BLOOD PRESSURE: 81 MMHG

## 2019-05-15 DIAGNOSIS — Z98.89 OTHER SPECIFIED POSTPROCEDURAL STATES: Chronic | ICD-10-CM

## 2019-05-15 DIAGNOSIS — Z90.710 ACQUIRED ABSENCE OF BOTH CERVIX AND UTERUS: Chronic | ICD-10-CM

## 2019-05-15 LAB
ALBUMIN SERPL ELPH-MCNC: 4.3 G/DL — SIGNIFICANT CHANGE UP (ref 3.3–5)
ALP SERPL-CCNC: 60 U/L — SIGNIFICANT CHANGE UP (ref 40–120)
ALT FLD-CCNC: 14 U/L — SIGNIFICANT CHANGE UP (ref 4–33)
ANION GAP SERPL CALC-SCNC: 15 MMO/L — HIGH (ref 7–14)
AST SERPL-CCNC: 19 U/L — SIGNIFICANT CHANGE UP (ref 4–32)
BASOPHILS # BLD AUTO: 0.09 K/UL — SIGNIFICANT CHANGE UP (ref 0–0.2)
BASOPHILS NFR BLD AUTO: 0.7 % — SIGNIFICANT CHANGE UP (ref 0–2)
BILIRUB SERPL-MCNC: 0.5 MG/DL — SIGNIFICANT CHANGE UP (ref 0.2–1.2)
BUN SERPL-MCNC: 16 MG/DL — SIGNIFICANT CHANGE UP (ref 7–23)
CALCIUM SERPL-MCNC: 9.9 MG/DL — SIGNIFICANT CHANGE UP (ref 8.4–10.5)
CHLORIDE SERPL-SCNC: 101 MMOL/L — SIGNIFICANT CHANGE UP (ref 98–107)
CO2 SERPL-SCNC: 27 MMOL/L — SIGNIFICANT CHANGE UP (ref 22–31)
CREAT SERPL-MCNC: 1.08 MG/DL — SIGNIFICANT CHANGE UP (ref 0.5–1.3)
EOSINOPHIL # BLD AUTO: 0.76 K/UL — HIGH (ref 0–0.5)
EOSINOPHIL NFR BLD AUTO: 5.5 % — SIGNIFICANT CHANGE UP (ref 0–6)
GLUCOSE SERPL-MCNC: 128 MG/DL — HIGH (ref 70–99)
HCT VFR BLD CALC: 41 % — SIGNIFICANT CHANGE UP (ref 34.5–45)
HGB BLD-MCNC: 13.1 G/DL — SIGNIFICANT CHANGE UP (ref 11.5–15.5)
IMM GRANULOCYTES NFR BLD AUTO: 0.9 % — SIGNIFICANT CHANGE UP (ref 0–1.5)
LYMPHOCYTES # BLD AUTO: 1.51 K/UL — SIGNIFICANT CHANGE UP (ref 1–3.3)
LYMPHOCYTES # BLD AUTO: 10.9 % — LOW (ref 13–44)
MCHC RBC-ENTMCNC: 30.7 PG — SIGNIFICANT CHANGE UP (ref 27–34)
MCHC RBC-ENTMCNC: 32 % — SIGNIFICANT CHANGE UP (ref 32–36)
MCV RBC AUTO: 96 FL — SIGNIFICANT CHANGE UP (ref 80–100)
MONOCYTES # BLD AUTO: 0.88 K/UL — SIGNIFICANT CHANGE UP (ref 0–0.9)
MONOCYTES NFR BLD AUTO: 6.4 % — SIGNIFICANT CHANGE UP (ref 2–14)
NEUTROPHILS # BLD AUTO: 10.43 K/UL — HIGH (ref 1.8–7.4)
NEUTROPHILS NFR BLD AUTO: 75.6 % — SIGNIFICANT CHANGE UP (ref 43–77)
NRBC # FLD: 0 K/UL — SIGNIFICANT CHANGE UP (ref 0–0)
PLATELET # BLD AUTO: 322 K/UL — SIGNIFICANT CHANGE UP (ref 150–400)
PMV BLD: 10.3 FL — SIGNIFICANT CHANGE UP (ref 7–13)
POTASSIUM SERPL-MCNC: 3.6 MMOL/L — SIGNIFICANT CHANGE UP (ref 3.5–5.3)
POTASSIUM SERPL-SCNC: 3.6 MMOL/L — SIGNIFICANT CHANGE UP (ref 3.5–5.3)
PROT SERPL-MCNC: 7.4 G/DL — SIGNIFICANT CHANGE UP (ref 6–8.3)
RBC # BLD: 4.27 M/UL — SIGNIFICANT CHANGE UP (ref 3.8–5.2)
RBC # FLD: 13.5 % — SIGNIFICANT CHANGE UP (ref 10.3–14.5)
SODIUM SERPL-SCNC: 143 MMOL/L — SIGNIFICANT CHANGE UP (ref 135–145)
WBC # BLD: 13.79 K/UL — HIGH (ref 3.8–10.5)
WBC # FLD AUTO: 13.79 K/UL — HIGH (ref 3.8–10.5)

## 2019-05-15 PROCEDURE — 96372 THER/PROPH/DIAG INJ SC/IM: CPT

## 2019-05-15 PROCEDURE — 99283 EMERGENCY DEPT VISIT LOW MDM: CPT

## 2019-05-15 RX ORDER — SODIUM CHLORIDE 9 MG/ML
1000 INJECTION INTRAMUSCULAR; INTRAVENOUS; SUBCUTANEOUS ONCE
Refills: 0 | Status: COMPLETED | OUTPATIENT
Start: 2019-05-15 | End: 2019-05-15

## 2019-05-15 RX ORDER — MEPOLIZUMAB 100 MG/ML
100 INJECTION, POWDER, FOR SOLUTION SUBCUTANEOUS
Qty: 0 | Refills: 0 | Status: COMPLETED | OUTPATIENT
Start: 2019-05-15

## 2019-05-15 RX ORDER — EPINEPHRINE 0.3 MG/.3ML
0.3 INJECTION INTRAMUSCULAR
Refills: 0 | Status: COMPLETED | OUTPATIENT
Start: 2019-05-15

## 2019-05-15 RX ADMIN — SODIUM CHLORIDE 1000 MILLILITER(S): 9 INJECTION INTRAMUSCULAR; INTRAVENOUS; SUBCUTANEOUS at 15:03

## 2019-05-15 RX ADMIN — MEPOLIZUMAB 0 MG: 100 INJECTION, POWDER, FOR SOLUTION SUBCUTANEOUS at 00:00

## 2019-05-15 NOTE — ADDENDUM
[FreeTextEntry1] : At 20-minutes post-Nucala injection, patient was found siting in chair with head rolled back, eyes closed, and difficult to arouse, however no loss of consciousness.  Lung sounds diminished.  O2 saturation 95% on room air, HR 50s, pulse thready.  BP not detected with electronic cuff.  Patient cool and clammy.  Dr. Lisker and Dr. Youngblood called.  EMS called.  EpiPen x1 given per MD, 25-minutes post-injection.  Patient eventually responsive to name.  A&O3.  No focal deficits noted.  Transferred to [facility].

## 2019-05-15 NOTE — REASON FOR VISIT
[Follow-Up] : a follow-up visit [Asthma] : asthma [Family Member] : family member [FreeTextEntry2] : Nucala Injection

## 2019-05-15 NOTE — ASSESSMENT
[FreeTextEntry1] : 62yo female patient with severe persistent asthma with past frequent exacerbations requiring hospitalization and bursts of prednisone.  Here today for her first Nucala injection.  Patient appears well today with no apparent signs of respiratory distress.  She was tachycardic and states that she last took ProAir before bed last night.  Admits to feeling anxious for her first injection.  Patient educated on side effects and when to contact the office.  I wrote out instructions on the plan over the next 4 weeks.  Will maintain her on prednisone 10mg QD until next visit, which we will discuss discontinuing at that time.  She will c/w Breo but will stop her Qvar.  Continue ProAir PRN.  Patient's son was present, as well, and I explained these to him also.  RTC in 4 weeks.

## 2019-05-15 NOTE — ED PROVIDER NOTE - ATTENDING CONTRIBUTION TO CARE
I was physically present for the E/M service provided. I agree with above history, physical, and plan which I have reviewed and edited where appropriate. I was physically present for the key portions of the service provided.    Barrett: 63 yr old female with hx of HIV undetectable viral load, asthma, HTN and breast CA given Nucala 1st time today. felt throat closing after meds along with sob and diaphoretic. passed out without any seizure like activity and no head trauma.  pt given epi for possible allergic reaction.  no post ictal phase.  no palpitations prior to syncope.  pt asx in ed.      *GEN:   comfortable, in no apparent distress, AOx3  *EYES:   PERRL, extra-occular movements intact  *HEENT:   airway patent, moist mucosal membranes, uvula midline  *CV:   regular rate and rhythm, normal S1/S2, no murmur  *RESP:   clear to auscultation bilaterally, non-labored, speaking in full sentences  *ABD:   soft, non tender, no guarding  *SKIN:   dry, intact, no rash  *NEURO:   AOx3, no focal weakness or loss of sensation, gait normal, GCS 15    a/p: syncope likely vagal vs arrhythmia vs lytes imbalance. no concern for acs or PE or anaphylaxis.  labs, ekg, monitor until 3p and if asx and monitor remains stable can dc

## 2019-05-15 NOTE — REVIEW OF SYSTEMS
[Nasal Congestion] : nasal congestion [As Noted in HPI] : as noted in HPI [Cough] : cough [Hypertension] : ~T hypertension [Nasal Discharge] : nasal discharge [Negative] : Constitutional [Sputum] : not coughing up ~M sputum [Sinus Problems] : no sinus problems [Hemoptysis] : no hemoptysis [Dyspnea] : no dyspnea [Chest Tightness] : no chest tightness [Pleuritic Pain] : no pleuritic pain [Wheezing] : no wheezing [Chest Discomfort] : no chest discomfort [Edema] : ~T edema was not present

## 2019-05-15 NOTE — REVIEW OF SYSTEMS
[Nasal Congestion] : nasal congestion [As Noted in HPI] : as noted in HPI [Cough] : cough [Hypertension] : ~T hypertension [Nasal Discharge] : nasal discharge [Negative] : Constitutional [Sinus Problems] : no sinus problems [Sputum] : not coughing up ~M sputum [Dyspnea] : no dyspnea [Hemoptysis] : no hemoptysis [Pleuritic Pain] : no pleuritic pain [Chest Tightness] : no chest tightness [Wheezing] : no wheezing [Chest Discomfort] : no chest discomfort [Edema] : ~T edema was not present

## 2019-05-15 NOTE — HISTORY OF PRESENT ILLNESS
[FreeTextEntry1] : 62yo female patient with severe persistent asthma with past frequent exacerbations requiring hospitalization and bursts of prednisone.  She was noted to have eosinophilia, elevated IgE and a +asthma profile.  Today, patient will be receiving her first Nucala injection.  At present, patient reports that her asthma is somewhat stable.  She admits to a cold with nasal congestion that started on Monday due to change in weather.  Denies dyspnea, wheezing, or chest tightness today.  We had previously asked her to maintain 10mg of prednisone until acquiring Nucala, however she states that she has been taking it on and off when she feels that her asthma is worse.  She states that she takes 20mg for three days when she feels tight, then stops on her own.  Last took 10mg three days ago.  Also, she takes Breo and Qvar, despite previously telling her to stop her Qvar.  She uses ProAir either up to three times daily.

## 2019-05-15 NOTE — ED ADULT NURSE NOTE - NSIMPLEMENTINTERV_GEN_ALL_ED
Implemented All Universal Safety Interventions:  [place] to call system. Call bell, personal items and telephone within reach. Instruct patient to call for assistance. Room bathroom lighting operational. Non-slip footwear when patient is off stretcher. Physically safe environment: no spills, clutter or unnecessary equipment. Stretcher in lowest position, wheels locked, appropriate side rails in place.

## 2019-05-15 NOTE — ED ADULT NURSE REASSESSMENT NOTE - NS ED NURSE REASSESS COMMENT FT1
handoff received from day RN- pt awake, a/ox3, vitally stable in NAD, ambulated to restroom with assist, denies any SOB, CP, dizziness, pt provided with food, will continue to monitor, family at bedside

## 2019-05-15 NOTE — ED PROVIDER NOTE - OBJECTIVE STATEMENT
64 Y/O F PMH 62 Y/O F PMH HIV with undetectable viral load, Asthma, HTN, Breat CA was given Nucala for the first time today for her asthma. Pt developed shortness of breath, became diaphoretic and passed out after receiving the medicatiion, also noted tightness in the throat. Pt received an epi pen at the scene, EMS was then notified. Pt currently A+O x 3, denies throat swelling or difficulty breathing, states she feels well.; Pt denies any other symptoms or complaints. 64 Y/O F PMH HIV with undetectable viral load, Asthma, HTN, Breat CA was given Nucala for the first time today for her asthma. Pt developed shortness of breath, became diaphoretic and passed out after receiving the medication, also noted tightness in the throat. Pt received an epi pen at the scene, EMS was then notified. Pt currently A+O x 3, denies throat swelling or difficulty breathing, states she feels well.; Pt denies any other symptoms or complaints.

## 2019-05-15 NOTE — ED PROVIDER NOTE - PROGRESS NOTE DETAILS
ramirez: improve. work up neg.  pt feels much better.  ekg initial bigeminy and rpt 1 pvc.  pt states gets white coat sx and hr always around 120 when at presence of MD.  no sob, no dizziness, no n/v.  feels better.  dx syncope likely vasovagal.  f/u with cardio and pcp.  should be re-evaluated for pvc. left ambulatory.  return precautions given.

## 2019-05-15 NOTE — ADDENDUM
[FreeTextEntry1] : At 20-minutes post-Nucala injection, patient was found siting in chair with head rolled back, eyes closed, and difficult to arouse, however no loss of consciousness.  Lung sounds diminished.  O2 saturation 95% on room air, HR 50s, pulse thready.  BP not detected with electronic cuff.  Patient cool and clammy.  Dr. Lisker and Dr. Youngblood called.  EMS called.  EpiPen x1 given per MD, 25-minutes post-injection.  Patient eventually responsive to name.  A&O3.  No focal deficits noted.  Transferred to [facility].

## 2019-05-15 NOTE — ED PROVIDER NOTE - CLINICAL SUMMARY MEDICAL DECISION MAKING FREE TEXT BOX
62 Y/O F PMH HIV with undetectable viral load, Asthma, HTN, Breat CA was given Nucala for the first time today for her asthma. Pt developed shortness of breath, became diaphoretic and passed out after receiving the medicatiion, also noted tightness in the throat.  Pt already received Epi prior to arrival and has no acute symptoms, will continue with airway monitoring and reassess. IV access obtained pt is stable at this time.

## 2019-05-15 NOTE — ED ADULT TRIAGE NOTE - CHIEF COMPLAINT QUOTE
Pt brought in by EMS from Pulmonologist office after a syncopal episode and was complaining of SOB as per EMS. pt received an injection of Nucala and had a syncopal episode. As per EMS pt was given EPI in the office. Pt denies chest pain, SOB, N/V/D, fever or chills.

## 2019-05-15 NOTE — HISTORY OF PRESENT ILLNESS
[FreeTextEntry1] : 64yo female patient with severe persistent asthma with past frequent exacerbations requiring hospitalization and bursts of prednisone.  She was noted to have eosinophilia, elevated IgE and a +asthma profile.  Today, patient will be receiving her first Nucala injection.  At present, patient reports that her asthma is somewhat stable.  She admits to a cold with nasal congestion that started on Monday due to change in weather.  Denies dyspnea, wheezing, or chest tightness today.  We had previously asked her to maintain 10mg of prednisone until acquiring Nucala, however she states that she has been taking it on and off when she feels that her asthma is worse.  She states that she takes 20mg for three days when she feels tight, then stops on her own.  Last took 10mg three days ago.  Also, she takes Breo and Qvar, despite previously telling her to stop her Qvar.  She uses ProAir either up to three times daily.

## 2019-05-15 NOTE — PHYSICAL EXAM
[Normal Appearance] : normal appearance [General Appearance - In No Acute Distress] : no acute distress [Normal Conjunctiva] : the conjunctiva exhibited no abnormalities [Normal Oropharynx] : normal oropharynx [Neck Appearance] : the appearance of the neck was normal [Heart Sounds] : normal S1 and S2 [] : no respiratory distress [Respiration, Rhythm And Depth] : normal respiratory rhythm and effort [Exaggerated Use Of Accessory Muscles For Inspiration] : no accessory muscle use [Auscultation Breath Sounds / Voice Sounds] : lungs were clear to auscultation bilaterally [Abnormal Walk] : normal gait [Nail Clubbing] : no clubbing of the fingernails [Cyanosis, Localized] : no localized cyanosis [No Focal Deficits] : no focal deficits [Mood] : the mood was normal [Affect] : the affect was normal [FreeTextEntry1] : NSR, tachycardic 110-115 [FreeTextEntry2] : no edema

## 2019-05-15 NOTE — PROCEDURE
[FreeTextEntry1] : Nucala 100mg injected in left upper arm.\par Lot #: [id number]\par Exp: 5/2022\par \par Explained to patient and son the side effects to note and s/sx of anaphylaxis.  Epipen not covered by insurance, however patient had Benadryl on standby and in-office Epipen on standby, as well.  Monitored for 2 hours post-injection.

## 2019-05-15 NOTE — PHYSICAL EXAM
[Normal Appearance] : normal appearance [General Appearance - In No Acute Distress] : no acute distress [Normal Conjunctiva] : the conjunctiva exhibited no abnormalities [Normal Oropharynx] : normal oropharynx [Neck Appearance] : the appearance of the neck was normal [Heart Sounds] : normal S1 and S2 [] : no respiratory distress [Respiration, Rhythm And Depth] : normal respiratory rhythm and effort [Exaggerated Use Of Accessory Muscles For Inspiration] : no accessory muscle use [Auscultation Breath Sounds / Voice Sounds] : lungs were clear to auscultation bilaterally [Abnormal Walk] : normal gait [Nail Clubbing] : no clubbing of the fingernails [Cyanosis, Localized] : no localized cyanosis [No Focal Deficits] : no focal deficits [Affect] : the affect was normal [Mood] : the mood was normal [FreeTextEntry2] : no edema [FreeTextEntry1] : NSR, tachycardic 110-115

## 2019-05-15 NOTE — ED ADULT NURSE NOTE - OBJECTIVE STATEMENT
Pt. A&Ox4, sent from pulmonologists office for syncopal episode. States she was starting on new medication when syncope occurred. Denies cp, sob, palpitations or n/v. Epi pen given at time for possible allergic rxn. Denies any difficulty swallowing/breathing. #20g IV placed in left AC by EMS and #20g IV placed in right AC in ED. Labs drawn and sent. MD at bedside for eval. VS done as charted, breathing well on RA. Respirations even and unlabored. EKG performed- NSR noted. Will continue to monitor.

## 2019-06-10 ENCOUNTER — OTHER (OUTPATIENT)
Age: 64
End: 2019-06-10

## 2019-06-11 ENCOUNTER — APPOINTMENT (OUTPATIENT)
Dept: ULTRASOUND IMAGING | Facility: CLINIC | Age: 64
End: 2019-06-11
Payer: COMMERCIAL

## 2019-06-12 ENCOUNTER — APPOINTMENT (OUTPATIENT)
Dept: ULTRASOUND IMAGING | Facility: CLINIC | Age: 64
End: 2019-06-12
Payer: COMMERCIAL

## 2019-06-12 ENCOUNTER — OUTPATIENT (OUTPATIENT)
Dept: OUTPATIENT SERVICES | Facility: HOSPITAL | Age: 64
LOS: 1 days | End: 2019-06-12
Payer: COMMERCIAL

## 2019-06-12 DIAGNOSIS — Z98.89 OTHER SPECIFIED POSTPROCEDURAL STATES: Chronic | ICD-10-CM

## 2019-06-12 DIAGNOSIS — Z00.8 ENCOUNTER FOR OTHER GENERAL EXAMINATION: ICD-10-CM

## 2019-06-12 DIAGNOSIS — Z90.710 ACQUIRED ABSENCE OF BOTH CERVIX AND UTERUS: Chronic | ICD-10-CM

## 2019-06-12 PROCEDURE — 93970 EXTREMITY STUDY: CPT | Mod: 26

## 2019-06-12 PROCEDURE — 93970 EXTREMITY STUDY: CPT

## 2019-06-18 ENCOUNTER — APPOINTMENT (OUTPATIENT)
Dept: PULMONOLOGY | Facility: CLINIC | Age: 64
End: 2019-06-18
Payer: COMMERCIAL

## 2019-06-18 VITALS
WEIGHT: 173.13 LBS | BODY MASS INDEX: 28.81 KG/M2 | RESPIRATION RATE: 17 BRPM | HEART RATE: 108 BPM | TEMPERATURE: 97.5 F | OXYGEN SATURATION: 94 % | DIASTOLIC BLOOD PRESSURE: 87 MMHG | SYSTOLIC BLOOD PRESSURE: 134 MMHG

## 2019-06-18 DIAGNOSIS — M79.89 OTHER SPECIFIED SOFT TISSUE DISORDERS: ICD-10-CM

## 2019-06-18 DIAGNOSIS — M71.21 SYNOVIAL CYST OF POPLITEAL SPACE [BAKER], RIGHT KNEE: ICD-10-CM

## 2019-06-18 PROCEDURE — 99214 OFFICE O/P EST MOD 30 MIN: CPT

## 2019-06-18 NOTE — REVIEW OF SYSTEMS
[Hypertension] : ~T hypertension [As Noted in HPI] : as noted in HPI [Negative] : Allergy/Immunology [Chest Discomfort] : no chest discomfort [Edema] : ~T edema was not present [Numbness] : no numbness

## 2019-06-18 NOTE — HISTORY OF PRESENT ILLNESS
[FreeTextEntry1] : 64yo female with severe persistent asthma being seen for hospital f/u for adverse reaction to first time Nucala injection on 5/15/19.  Discharged home and saw cardiology (Dr. Camara) for PVC's noted while in ED.  Per patient's report, she was cleared by cardiology.  She had a b/l LE Doppler study for L leg swelling with pain after being discharged.  Study was negative for DVT, however noted b/l popliteal cysts, L>R.  L Mullen's cyst may be partially ruptured.  Her L knee is swollen and tender to touch and movement.  Has been self-medicating with Tylenol but with no relief.  Asthma symptoms are controlled at this time with Breo and PRN rescue inhaler.  She is off prednisone.\par \par LE Doppler study impression: \par No evidence of bilateral lower extremity deep venous thrombosis. \par Bilateral popliteal cysts are identified. \par The right Baker's cyst measures 3.6 x 1.1 x 2.9 cm. The left Baker's cyst is \par larger, irregular and complex in appearance measuring 9.1 x 2 x 3.5 cm. It \par is filled with debris. A partially ruptured cyst is suspected.

## 2019-06-18 NOTE — ASSESSMENT
[FreeTextEntry1] : 64yo female with severe persistent asthma being seen for hospital f/u for adverse reaction to first time Nucala injection on 5/15/19. (mayhave been vasovagal)  Asthma appears stable at this time.  Her lung functions have not shown significant obstruction or bronchodilator repsonse, however she has shown eosinophilic and elevated IgE component.  C/w Breo and rescue inhaler for now.  Monitor peak flows daily (goal 300-400).  L knee pain suspected ruptured Baker's cyst.  Encouraged rest, ice, compression and elevation.  Gave a list of orthopedic doctors.  She will call us to let us know who she is seeing, and office note/Doppler study will be faxed over at that time.  F/u 3 months or sooner if needed.

## 2019-06-18 NOTE — PHYSICAL EXAM
[Normal Appearance] : normal appearance [General Appearance - In No Acute Distress] : no acute distress [Normal Conjunctiva] : the conjunctiva exhibited no abnormalities [Normal Oropharynx] : normal oropharynx [Neck Appearance] : the appearance of the neck was normal [Heart Sounds] : normal S1 and S2 [] : no respiratory distress [Respiration, Rhythm And Depth] : normal respiratory rhythm and effort [Exaggerated Use Of Accessory Muscles For Inspiration] : no accessory muscle use [Auscultation Breath Sounds / Voice Sounds] : lungs were clear to auscultation bilaterally [Nail Clubbing] : no clubbing of the fingernails [Cyanosis, Localized] : no localized cyanosis [No Focal Deficits] : no focal deficits [Affect] : the affect was normal [Mood] : the mood was normal [FreeTextEntry1] : L knee swollen; no discoloration or temperature change noticed [FreeTextEntry2] : L ankle trace pitting edema

## 2019-06-18 NOTE — REASON FOR VISIT
[Family Member] : family member [Follow-Up - From Hospitalization] : a hospitalization follow-up [FreeTextEntry2] : adverse reaction to Nucala injection (for asthma)

## 2019-06-25 ENCOUNTER — APPOINTMENT (OUTPATIENT)
Dept: ORTHOPEDIC SURGERY | Facility: CLINIC | Age: 64
End: 2019-06-25
Payer: COMMERCIAL

## 2019-06-25 VITALS
BODY MASS INDEX: 28.82 KG/M2 | HEART RATE: 102 BPM | WEIGHT: 173 LBS | HEIGHT: 65 IN | SYSTOLIC BLOOD PRESSURE: 132 MMHG | DIASTOLIC BLOOD PRESSURE: 84 MMHG

## 2019-06-25 DIAGNOSIS — S83.412A SPRAIN OF MEDIAL COLLATERAL LIGAMENT OF LEFT KNEE, INITIAL ENCOUNTER: ICD-10-CM

## 2019-06-25 DIAGNOSIS — M71.22 SYNOVIAL CYST OF POPLITEAL SPACE [BAKER], LEFT KNEE: ICD-10-CM

## 2019-06-25 PROCEDURE — 73562 X-RAY EXAM OF KNEE 3: CPT | Mod: LT

## 2019-06-25 PROCEDURE — 99203 OFFICE O/P NEW LOW 30 MIN: CPT

## 2019-06-25 NOTE — HISTORY OF PRESENT ILLNESS
[de-identified] : 63 y.o F presents to the office complaining of 1 month R knee pain and swelling. She states she was receiving shots to treat asthma last month and had an episode of syncope.\par She claims she had a reaction to the asthma injection and needed an epi-pen shot via EMS. She states when she woke she had immediate in her R knee. She has continued pain and swelling in the R knee and lower leg. She states she is unsure how her knee pain started, denies any previous SILAS or R knee pain. Denies NT. Denies mechanical symptoms.\par \par The patient's past medical history, past surgical history, medications, allergies, and social history were reviewed by me today with the patient and documented accordingly. In addition, the patient's family history, which is noncontributory to this visit, was also reviewed.\par

## 2019-06-25 NOTE — PHYSICAL EXAM
[de-identified] : \par The following radiographs were ordered and read by me during this patients visit. I reviewed each radiograph in detail with the patient and discussed the findings as highlighted below. \par \par 3 views left knee were obtained today. There is mild osteoarthritis slight joint space narrowing\par Ultrasound left knee obtained previously shows large Baker cyst\par \par  [de-identified] : General Exam\par \par Well developed, well nourished\par No apparent distress\par Oriented to person, place, and time\par Mood: Normal\par Affect: Normal\par Balance and coordination: Normal\par Gait: Normal\par \par left knee exam\par \par Skin: Clean, dry, intact\par Inspection: No obvious malalignment, no masses, no swelling, no effusion.\par Tenderness: + MJLT. No LJLT. No tenderness over the medial and lateral patella facets. No ttp medial/lateral epicondyle, patella tendon, tibial tubercle, pes anserinus, or proximal fibula.\par ROM: 0 to 130° no pain with deep flexion in both knees\par Stability: Stable to varus, valgus, lachman testing. Negative anterior/posterior drawer. +pain w valgus no laxity\par Additional tests: Negative McMurrays test, Negative patellar grind test. \par Strength: 5/5 Q/H/TA/GS/EHL, no atrophy\par Neuro: In tact to light touch throughout in dp/sp/tib/sarita/saph nerve districutions, DTR's normal\par Pulses: 2+ DP/PT pulses.\par

## 2019-06-25 NOTE — PHYSICAL EXAM
[de-identified] : \par The following radiographs were ordered and read by me during this patients visit. I reviewed each radiograph in detail with the patient and discussed the findings as highlighted below. \par \par 3 views left knee were obtained today. There is mild osteoarthritis slight joint space narrowing\par Ultrasound left knee obtained previously shows large Baker cyst\par \par  [de-identified] : General Exam\par \par Well developed, well nourished\par No apparent distress\par Oriented to person, place, and time\par Mood: Normal\par Affect: Normal\par Balance and coordination: Normal\par Gait: Normal\par \par left knee exam\par \par Skin: Clean, dry, intact\par Inspection: No obvious malalignment, no masses, no swelling, no effusion.\par Tenderness: + MJLT. No LJLT. No tenderness over the medial and lateral patella facets. No ttp medial/lateral epicondyle, patella tendon, tibial tubercle, pes anserinus, or proximal fibula.\par ROM: 0 to 130° no pain with deep flexion in both knees\par Stability: Stable to varus, valgus, lachman testing. Negative anterior/posterior drawer. +pain w valgus no laxity\par Additional tests: Negative McMurrays test, Negative patellar grind test. \par Strength: 5/5 Q/H/TA/GS/EHL, no atrophy\par Neuro: In tact to light touch throughout in dp/sp/tib/sarita/saph nerve districutions, DTR's normal\par Pulses: 2+ DP/PT pulses.\par

## 2019-06-25 NOTE — HISTORY OF PRESENT ILLNESS
[de-identified] : 63 y.o F presents to the office complaining of 1 month R knee pain and swelling. She states she was receiving shots to treat asthma last month and had an episode of syncope.\par She claims she had a reaction to the asthma injection and needed an epi-pen shot via EMS. She states when she woke she had immediate in her R knee. She has continued pain and swelling in the R knee and lower leg. She states she is unsure how her knee pain started, denies any previous SILAS or R knee pain. Denies NT. Denies mechanical symptoms.\par \par The patient's past medical history, past surgical history, medications, allergies, and social history were reviewed by me today with the patient and documented accordingly. In addition, the patient's family history, which is noncontributory to this visit, was also reviewed.\par

## 2019-06-25 NOTE — DISCUSSION/SUMMARY
[de-identified] : 63-year-old female for left knee mild arthritis in medial collateral ligament sprain. Activities as tolerated if physical therapy discussed the course of healing if not improved consider MRI. All questions answered

## 2019-06-25 NOTE — DISCUSSION/SUMMARY
[de-identified] : 63-year-old female for left knee mild arthritis in medial collateral ligament sprain. Activities as tolerated if physical therapy discussed the course of healing if not improved consider MRI. All questions answered

## 2019-07-22 ENCOUNTER — MEDICATION RENEWAL (OUTPATIENT)
Age: 64
End: 2019-07-22

## 2019-09-26 ENCOUNTER — OTHER (OUTPATIENT)
Age: 64
End: 2019-09-26

## 2019-10-02 ENCOUNTER — APPOINTMENT (OUTPATIENT)
Dept: PULMONOLOGY | Facility: CLINIC | Age: 64
End: 2019-10-02
Payer: COMMERCIAL

## 2019-10-02 VITALS
TEMPERATURE: 97.9 F | BODY MASS INDEX: 27.66 KG/M2 | SYSTOLIC BLOOD PRESSURE: 122 MMHG | DIASTOLIC BLOOD PRESSURE: 85 MMHG | OXYGEN SATURATION: 93 % | HEIGHT: 65 IN | RESPIRATION RATE: 15 BRPM | WEIGHT: 166 LBS | HEART RATE: 96 BPM

## 2019-10-02 LAB
BASOPHILS # BLD AUTO: 0.06 K/UL
BASOPHILS NFR BLD AUTO: 0.5 %
EOSINOPHIL # BLD AUTO: 0.18 K/UL
EOSINOPHIL NFR BLD AUTO: 1.6 %
HCT VFR BLD CALC: 38.5 %
HGB BLD-MCNC: 12.2 G/DL
IMM GRANULOCYTES NFR BLD AUTO: 0.3 %
LYMPHOCYTES # BLD AUTO: 1.19 K/UL
LYMPHOCYTES NFR BLD AUTO: 10.8 %
MAN DIFF?: NORMAL
MCHC RBC-ENTMCNC: 31.7 GM/DL
MCHC RBC-ENTMCNC: 32.4 PG
MCV RBC AUTO: 102.1 FL
MONOCYTES # BLD AUTO: 0.34 K/UL
MONOCYTES NFR BLD AUTO: 3.1 %
NEUTROPHILS # BLD AUTO: 9.22 K/UL
NEUTROPHILS NFR BLD AUTO: 83.7 %
PLATELET # BLD AUTO: 346 K/UL
RBC # BLD: 3.77 M/UL
RBC # FLD: 12.3 %
WBC # FLD AUTO: 11.02 K/UL

## 2019-10-02 PROCEDURE — 99214 OFFICE O/P EST MOD 30 MIN: CPT

## 2019-10-02 NOTE — HISTORY OF PRESENT ILLNESS
[FreeTextEntry1] : 63-year-old female with severe persistent asthma. She has been using her inhalers regularly up until several weeks ago when her asthma flares. She started prednisone at 30 mg per day and began to taper now at the end of her regimen, she is wheezing again. She denies any fevers or respiratory infections. She otherwise feels well.

## 2019-10-02 NOTE — PHYSICAL EXAM
[Normal Conjunctiva] : the conjunctiva exhibited no abnormalities [Neck Appearance] : the appearance of the neck was normal [Heart Rate And Rhythm] : heart rate and rhythm were normal [] : the neck was supple [Heart Sounds] : normal S1 and S2 [Abnormal Walk] : normal gait [Nail Clubbing] : no clubbing of the fingernails [Cyanosis, Localized] : no localized cyanosis [No Focal Deficits] : no focal deficits [FreeTextEntry1] : wheezing bilaterally

## 2019-10-02 NOTE — ASSESSMENT
[FreeTextEntry1] : the patient is having an acute asthmatic attack. The patient has been given a nebulizer treatment with albuterol which will be repeated if necessary. I'm sending a prescription for prednisone 40 mg a day for the next week. If okay she will taper. If not okay she will call.\par previously because of eosinophilia we tried fasenra but she developed lightheadedness and near syncope and refused further treatment. She will followup CBC and IgE

## 2019-10-03 LAB — IGE SER-MCNC: 91 KU/L

## 2019-11-07 ENCOUNTER — APPOINTMENT (OUTPATIENT)
Dept: PULMONOLOGY | Facility: CLINIC | Age: 64
End: 2019-11-07
Payer: COMMERCIAL

## 2019-11-07 VITALS
DIASTOLIC BLOOD PRESSURE: 83 MMHG | TEMPERATURE: 98 F | WEIGHT: 169 LBS | SYSTOLIC BLOOD PRESSURE: 135 MMHG | OXYGEN SATURATION: 94 % | HEART RATE: 99 BPM | HEIGHT: 65 IN | BODY MASS INDEX: 28.16 KG/M2 | RESPIRATION RATE: 16 BRPM

## 2019-11-07 PROCEDURE — 99213 OFFICE O/P EST LOW 20 MIN: CPT

## 2019-11-07 NOTE — PHYSICAL EXAM
[Normal Conjunctiva] : the conjunctiva exhibited no abnormalities [Neck Appearance] : the appearance of the neck was normal [Heart Rate And Rhythm] : heart rate and rhythm were normal [Heart Sounds] : normal S1 and S2 [Abnormal Walk] : normal gait [Nail Clubbing] : no clubbing of the fingernails [No Focal Deficits] : no focal deficits [Cyanosis, Localized] : no localized cyanosis [Normal Appearance] : normal appearance [General Appearance - In No Acute Distress] : no acute distress [Normal Oropharynx] : normal oropharynx [Murmurs] : no murmurs present [Arterial Pulses Normal] : the arterial pulses were normal [Edema] : no peripheral edema present [] : no respiratory distress [Respiration, Rhythm And Depth] : normal respiratory rhythm and effort [Exaggerated Use Of Accessory Muscles For Inspiration] : no accessory muscle use [FreeTextEntry1] : occasional wheeze in posterior lung field

## 2019-11-07 NOTE — REVIEW OF SYSTEMS
[Negative] : Constitutional [Cough] : cough [Chest Tightness] : chest tightness [Dyspnea] : dyspnea [Wheezing] : wheezing [Hypertension] : ~T hypertension [Nasal Congestion] : no nasal congestion [Sputum] : not coughing up ~M sputum

## 2019-11-07 NOTE — HISTORY OF PRESENT ILLNESS
[FreeTextEntry1] : 62yo female with severe persistent asthma.  Seen recently for acute asthma exacerbation.  Prednisone taper completed on 11/4.  Reports that her symptoms have returned, including wheezing, cough, and shortness of breath.  Currently maintained on Breo and ProAir/nebulized albuterol.  She was previously started on Nucala but developed lightheadedness and what appeared to be a vasovagal response after the first injection.  Prior to this, she has had multiple visits to the ED and has required bursts of steroids throughout the year.\par \par Recent blood work did not show eosinophilia, however she previously has demonstrated this.  She also has a +asthma profile and elevated IgE.

## 2019-11-07 NOTE — ASSESSMENT
[FreeTextEntry1] : 62yo female with severe persistent asthma recently treated for asthma exacerbation.  Since completing prednisone taper, symptoms have returned however she does not appear to be in acute distress today.  Lung sounds with occasional wheezing posteriorly.  Plan to restart prednisone 20mg x7 days, then will decrease to 10mg if okay and stay on that dose until f/u in 3 weeks.  Discussed lab results with patient.  She would qualify for omalizumab or benralizumab, which she is interested in retrying despite previous reaction to mepolizumab.  She is not interested in self-injections and does not want home administration, therefor dupilumab would not be an option.

## 2019-11-26 RX ORDER — BENRALIZUMAB 30 MG/ML
30 INJECTION, SOLUTION SUBCUTANEOUS
Qty: 1 | Refills: 7 | Status: DISCONTINUED | COMMUNITY
Start: 2019-11-13 | End: 2019-11-26

## 2019-12-03 ENCOUNTER — APPOINTMENT (OUTPATIENT)
Dept: PULMONOLOGY | Facility: CLINIC | Age: 64
End: 2019-12-03
Payer: COMMERCIAL

## 2019-12-03 VITALS
SYSTOLIC BLOOD PRESSURE: 120 MMHG | DIASTOLIC BLOOD PRESSURE: 74 MMHG | TEMPERATURE: 98.4 F | RESPIRATION RATE: 18 BRPM | BODY MASS INDEX: 28.32 KG/M2 | HEIGHT: 65 IN | WEIGHT: 170 LBS | HEART RATE: 109 BPM

## 2019-12-03 PROCEDURE — 99214 OFFICE O/P EST MOD 30 MIN: CPT

## 2019-12-03 NOTE — REVIEW OF SYSTEMS
[Cough] : cough [Hypertension] : ~T hypertension [Negative] : Constitutional [Nasal Congestion] : no nasal congestion [Postnasal Drip] : no postnasal drip [Sinus Problems] : no sinus problems [Sputum] : not coughing up ~M sputum [Dyspnea] : no dyspnea [Chest Tightness] : no chest tightness [Wheezing] : no wheezing

## 2019-12-03 NOTE — HISTORY OF PRESENT ILLNESS
[FreeTextEntry1] : 62yo female with severe persistent asthma.  She was previously started on Nucala but developed lightheadedness and what appeared to be a vasovagal response after the first injection.  Prior to this, she has had multiple visits to the ED and has required bursts of steroids throughout the year.  Currently on Breo QD, Ventolin PRN, and prednisone 10mg QD.  Recent blood work did not show eosinophilia, however she previously has demonstrated this.  She also has a +asthma profile and elevated IgE.  Recently approved for Xolair.

## 2019-12-03 NOTE — ASSESSMENT
[FreeTextEntry1] : 62yo female with severe persistent asthma.  Patient appears well today.  Lungs CTA on exam.  Waiting to start Xolair, pending delivery.  Plan to continue with Breo, Ventolin and prednisone 10mg until starting Xolair.  Will follow monthly until able to start.  Strongly urged to call our office should she feel more short of breath, with wheezing, or increased cough, as opposed to going to ED.

## 2019-12-03 NOTE — PHYSICAL EXAM
[Normal Appearance] : normal appearance [Normal Conjunctiva] : the conjunctiva exhibited no abnormalities [General Appearance - In No Acute Distress] : no acute distress [Neck Appearance] : the appearance of the neck was normal [Normal Oropharynx] : normal oropharynx [Murmurs] : no murmurs present [Heart Sounds] : normal S1 and S2 [Heart Rate And Rhythm] : heart rate and rhythm were normal [Edema] : no peripheral edema present [Arterial Pulses Normal] : the arterial pulses were normal [Auscultation Breath Sounds / Voice Sounds] : lungs were clear to auscultation bilaterally [Exaggerated Use Of Accessory Muscles For Inspiration] : no accessory muscle use [Respiration, Rhythm And Depth] : normal respiratory rhythm and effort [Abnormal Walk] : normal gait [Nail Clubbing] : no clubbing of the fingernails [Cyanosis, Localized] : no localized cyanosis [Skin Color & Pigmentation] : normal skin color and pigmentation [] : no rash [No Focal Deficits] : no focal deficits [Affect] : the affect was normal [Mood] : the mood was normal [FreeTextEntry1] : no lymphadenopathy

## 2019-12-06 ENCOUNTER — MEDICATION RENEWAL (OUTPATIENT)
Age: 64
End: 2019-12-06

## 2020-01-13 ENCOUNTER — RX RENEWAL (OUTPATIENT)
Age: 65
End: 2020-01-13

## 2020-03-04 NOTE — ED ADULT NURSE NOTE - FINAL NURSING ELECTRONIC SIGNATURE
Onset: today  Location/Description: Pt is calling, she is 38 weeks pregnant, she reports today at 1630 she developed some intermittent dull cramping.  Pt states the cramping is lasting for longer that 2-3 minutes but does  Vaginal Drainage/Bleeding:  Pt denies any vaginal bleeding or leaking of fluid  Fetus Active?  yes  Precipitating Factors:  As above  Pain Scale (1-10), 10 highest: 2-3 /10  Associated Symptoms: as above  LMP: Patient's last menstrual period was 2019.  EDC:  3/17/2020  Gestational Age:  38w0d  Blood Type: A POSITIVE  OB History:    OB History    Para Term  AB Living   1 0 0 0 0 0   SAB TAB Ectopic Molar Multiple Live Births   0 0 0 0 0 0        Vaginal/C Section: plans vaginal delivery  Group B Strep (pos or neg):  NEG  History of previous Labor & Delivery: as above  Recent Care:  20 OB check    On call MD, Dr. Modesto alba, given pt information, instructions received and given to pt:  May continue to monitor and follow home care protocols.    Pt verbalizes understanding to instructions given.    PLAN:  Home Care Advice provided    Patient/Caller agrees to follow recommendations.      Reason for Disposition  • MILD abdominal pain (all triage questions negative)    Protocols used: PREGNANCY - ABDOMINAL PAIN GREATER THAN 20 WEEKS EGA-A-       15-May-2019 16:31

## 2020-07-21 ENCOUNTER — APPOINTMENT (OUTPATIENT)
Dept: PULMONOLOGY | Facility: CLINIC | Age: 65
End: 2020-07-21
Payer: COMMERCIAL

## 2020-07-21 ENCOUNTER — RESULT REVIEW (OUTPATIENT)
Age: 65
End: 2020-07-21

## 2020-07-21 ENCOUNTER — APPOINTMENT (OUTPATIENT)
Dept: RADIOLOGY | Facility: IMAGING CENTER | Age: 65
End: 2020-07-21
Payer: COMMERCIAL

## 2020-07-21 ENCOUNTER — OUTPATIENT (OUTPATIENT)
Dept: OUTPATIENT SERVICES | Facility: HOSPITAL | Age: 65
LOS: 1 days | End: 2020-07-21
Payer: COMMERCIAL

## 2020-07-21 VITALS
TEMPERATURE: 98 F | HEIGHT: 65 IN | BODY MASS INDEX: 29.16 KG/M2 | RESPIRATION RATE: 16 BRPM | DIASTOLIC BLOOD PRESSURE: 81 MMHG | SYSTOLIC BLOOD PRESSURE: 132 MMHG | WEIGHT: 175 LBS | HEART RATE: 90 BPM

## 2020-07-21 DIAGNOSIS — Z98.89 OTHER SPECIFIED POSTPROCEDURAL STATES: Chronic | ICD-10-CM

## 2020-07-21 DIAGNOSIS — I10 ESSENTIAL (PRIMARY) HYPERTENSION: ICD-10-CM

## 2020-07-21 DIAGNOSIS — J18.9 PNEUMONIA, UNSPECIFIED ORGANISM: ICD-10-CM

## 2020-07-21 DIAGNOSIS — Z90.710 ACQUIRED ABSENCE OF BOTH CERVIX AND UTERUS: Chronic | ICD-10-CM

## 2020-07-21 DIAGNOSIS — J45.50 SEVERE PERSISTENT ASTHMA, UNCOMPLICATED: ICD-10-CM

## 2020-07-21 PROCEDURE — 99214 OFFICE O/P EST MOD 30 MIN: CPT

## 2020-07-21 PROCEDURE — 71046 X-RAY EXAM CHEST 2 VIEWS: CPT | Mod: 26

## 2020-07-21 PROCEDURE — 71046 X-RAY EXAM CHEST 2 VIEWS: CPT

## 2020-07-21 RX ORDER — FLUTICASONE FUROATE AND VILANTEROL TRIFENATATE 200; 25 UG/1; UG/1
200-25 POWDER RESPIRATORY (INHALATION)
Qty: 1 | Refills: 6 | Status: DISCONTINUED | COMMUNITY
Start: 2017-11-16 | End: 2020-07-21

## 2020-07-21 NOTE — ASSESSMENT
[FreeTextEntry1] : 64 female with severe persistent asthma here for follow up and chest tightness over the last 2 weeks. Currently taking ANTON PRN and prednisone 10mg for her asthma as she was unable to continue on Breo due to insurance issues. For the last 2 weeks she has increased SOB and chest tightness which improved after starting a Z-pack. \par Denies fevers, chills, covid-19 exposures. \par \par -Will obtain CXR to rule out PNA\par -May complete the course of azithromycin\par -Will continue ANTON PRN\par -Will start Advair for maintenance therapy\par -After restarting Advair will readdress symptoms with hopes to reduce and eventually come off prednisone\par -Can defer Xolera for now\par \par Probable RLL pneumonia\par -Patient instructed to call the office if her symptoms persist or worsen

## 2020-07-21 NOTE — REVIEW OF SYSTEMS
[Cough] : cough [Chest Tightness] : chest tightness [SOB on Exertion] : sob on exertion [Wheezing] : wheezing [Sputum] : sputum [Chest Discomfort] : chest discomfort [Orthopnea] : no orthopnea [Fever] : no fever [Chills] : no chills [Headache] : no headache [GERD] : no gerd [Dizziness] : no dizziness [Anxiety] : no anxiety [Depression] : no depression

## 2020-07-21 NOTE — PHYSICAL EXAM
[No Acute Distress] : no acute distress [No Deformities] : no deformities [Normal Oropharynx] : normal oropharynx [Normal Rate/Rhythm] : normal rate/rhythm [Normal Appearance] : normal appearance [Normal S1, S2] : normal s1, s2 [No Resp Distress] : no resp distress [Benign] : benign [No Clubbing] : no clubbing [TextBox_68] : no wheezing, few crackles in the right lower hemithorax

## 2020-07-21 NOTE — HISTORY OF PRESENT ILLNESS
[TextBox_4] : 64 female with severe persistent asthma here for follow up. She was previously started on Nucala but developed lightheadedness and what appeared to be a vasovagal response after the first injection. Prior to this, she has had multiple visits to the ED and has required bursts of steroids throughout the year. Patient was previously on Breo QD but it is no longer covered by her insurance. She has continued on Proair and prednisone 10mg daily. She was approved for Xolair with elevated IgE levels but never initiated therapy. For the last 2 weeks she reports that she has increased SOB, wheezing and chest tightness. Cough without mucous production initially but now improved after initiating Z-pack given to her by a friend. She was using her Proair 4-5x daily but not for the last 2 days, using nebulizer 2x daily and continues on her prednisone 10mg.  She reported limited exercise tolerance due to walking. Denies fevers, chills, covid-19 exposures.

## 2020-07-29 ENCOUNTER — OUTPATIENT (OUTPATIENT)
Dept: OUTPATIENT SERVICES | Facility: HOSPITAL | Age: 65
LOS: 1 days | End: 2020-07-29
Payer: COMMERCIAL

## 2020-07-29 ENCOUNTER — APPOINTMENT (OUTPATIENT)
Dept: RADIOLOGY | Facility: IMAGING CENTER | Age: 65
End: 2020-07-29
Payer: COMMERCIAL

## 2020-07-29 DIAGNOSIS — Z98.89 OTHER SPECIFIED POSTPROCEDURAL STATES: Chronic | ICD-10-CM

## 2020-07-29 DIAGNOSIS — J18.9 PNEUMONIA, UNSPECIFIED ORGANISM: ICD-10-CM

## 2020-07-29 DIAGNOSIS — Z90.710 ACQUIRED ABSENCE OF BOTH CERVIX AND UTERUS: Chronic | ICD-10-CM

## 2020-07-29 PROCEDURE — 71046 X-RAY EXAM CHEST 2 VIEWS: CPT

## 2020-07-29 PROCEDURE — 71046 X-RAY EXAM CHEST 2 VIEWS: CPT | Mod: 26

## 2020-09-22 ENCOUNTER — APPOINTMENT (OUTPATIENT)
Dept: UROGYNECOLOGY | Facility: CLINIC | Age: 65
End: 2020-09-22
Payer: COMMERCIAL

## 2020-09-22 PROCEDURE — A4562: CPT

## 2020-09-22 PROCEDURE — 99214 OFFICE O/P EST MOD 30 MIN: CPT

## 2020-09-22 NOTE — PHYSICAL EXAM
[Chaperone Present] : A chaperone was present in the examining room during all aspects of the physical examination [Vulvar Atrophy] : vulvar atrophy [Labia Majora] : were normal [Labia Minora] : were normal [Normal Appearance] : general appearance was normal [Atrophy] : atrophy [No Bleeding] : there was no active vaginal bleeding [Normal] : no abnormalities [Exam Deferred] : was deferred [Attentuated] : perineal body was attenuated [1] : 1 [Aa ____] : Aa [unfilled] [Ba ____] : Ba [unfilled] [C ____] : C [unfilled] [GH ____] : GH [unfilled] [PB ____] : PB [unfilled] [TVL ____] : TVL  [unfilled] [Ap ____] : Ap [unfilled] [Bp ____] : Bp [unfilled] [Absent] : absent [Post Void Residual ____ml] : post void residual was [unfilled] ml [FreeTextEntry1] : General: Well, appearing. Alert and orientated. No acute distress\par HEENT: Normocephalic, atraumatic and extraocular muscles appear to be intact \par Neck: Full range of motion, no obvious lymphadenopathy, deformities, or masses noted \par Respiratory: Speaking in full sentences comfortably, normal work of breathing and no cough during visit\par Musculoskeletal: active full range of motion in extremities \par Extremities: No upper extremity edema noted\par Skin: no obvious rash or skin lesions\par Neuro: Orientated X 3, speech is fluent, normal rate\par Psych: Normal mood and affect \par   [Tenderness] : ~T no ~M abdominal tenderness observed [Distended] : not distended [FreeTextEntry4] : extruding prolene suture at cuff, no dehiscence. Suture excised with scissors. No bleeding.  [de-identified] : PVR checked prior to pessary insertion

## 2020-09-22 NOTE — HISTORY OF PRESENT ILLNESS
[Vaginal Wall Prolapse] : no [] : years ago [Rectal Prolapse] : no [Urinary Frequency] : no [Urinary Tract Infection] : no [Constipation Obstructed Defecation] : no [FreeTextEntry1] : \par Landy presents for follow up of pelvic organ prolapse. She was previously undergoing treatment with a cube pessary #5 and was performing self-care 1-2 times per week. She removed the pessary ~1 year ago because she felt prolapse past it and was having difficulty removing it on her own. She has kept out the pessary since that time and reports bothersome prolapse symptoms.  Her last visit was May 2019. She reports delay in visit due to pandemic. \par \par She is not sexually active and desires another pessary, however declines self-care. She prefers to come to office for care. \par \par PSH: JAMEY (fibroids), abdominal wall surgery as child

## 2020-09-22 NOTE — DISCUSSION/SUMMARY
[FreeTextEntry1] : \par Pelvic organ prolapse:\par -Refitted with larger cube today, #6\par -RTO in 1-2 weeks for pessary check and follow up\par -She opts for pessary care in office. If good fit, will come in every 2 months for pessary care\par - Pessary precautions and instructions reviewed

## 2020-09-22 NOTE — PROCEDURE
[Good Fit] : fits well [H2O] : H2O [None] : no bleeding [Pessary Inserted] : inserted [Erosion] : no evidence of erosion [Erythema] : no erythema [Discharge] : no vaginal discharge [Infection] : no evidence of infection [FreeTextEntry1] : gellhorn #2 3/4 fell out with valsalva, refitted with cube #6 [FreeTextEntry8] : routine davis-care, self care 2x weekly

## 2020-09-27 PROCEDURE — 0: CUSTOM

## 2020-10-15 ENCOUNTER — APPOINTMENT (OUTPATIENT)
Dept: UROGYNECOLOGY | Facility: CLINIC | Age: 65
End: 2020-10-15
Payer: COMMERCIAL

## 2020-10-15 DIAGNOSIS — Z46.89 ENCOUNTER FOR FITTING AND ADJUSTMENT OF OTHER SPECIFIED DEVICES: ICD-10-CM

## 2020-10-15 PROCEDURE — 99213 OFFICE O/P EST LOW 20 MIN: CPT

## 2020-10-15 NOTE — PROCEDURE
[Good Fit] : fits well [Refit] : refit is not needed [Erosion] : no evidence of erosion [Erythema] : erythema noted [Discharge] : there is vaginal discharge [Infection] : no evidence of infection [Pessary Inserted] : inserted [H2O] : H2O [Pessary Washed] : washed [Mild] : mild bleeding [Medication Review] : Medicaiton Review: Patient verbalizes understanding of risks and benefits [Bowel Management] : Bowel Management: patient verbalizes understanding of daily dietary fiber intake [Fluid Management] : Fluid Management: patient verbalizes understanding 6-10 cups per day [FreeTextEntry1] : Cube #6 [Bladder Training] : Bladder Training: Patient given information with verbal understanding [de-identified] : scant white leukorrhea [de-identified] : at left lateral wall [de-identified] : with removal of pessary, resolved spontaneously [FreeTextEntry3] : Advised OTC Replens vaginal cream PV BIW HS PRN [FreeTextEntry4] : Advised avoid constipation/straining w/ daily fiber/fluid intake and stool softeners daily PRN [FreeTextEntry8] : Con't daily proper pericare

## 2020-10-15 NOTE — DISCUSSION/SUMMARY
[FreeTextEntry1] : x8 weeks f/u POP and pessary care or sooner prn\par Advised avoid constipation/straining w/ daily fiber/fluid intake and stool softeners daily PRN\par Advised OTC Replens vaginal cream PV BIW HS PRN\par Advised perform daily PFE's/Kiegel's exercises x50 cycles of x2 second pulses and x5 second endurance\par Instructed pt to call the office if any problems or concerns and she verbalized understanding.\par

## 2020-10-15 NOTE — HISTORY OF PRESENT ILLNESS
[FreeTextEntry1] : Pt w/ hx POP refitted with Cube #6 pessary presents to office today for routine care.  Pt is happy with support provided by pessary and denies any issues with it.  Feels empty after voids.  Denies any constipation w/ intake of daily stool softeners and PRN Olive oil.  Pt reports experiencing some NANCY, not aware to perform daily PFE's, Na.

## 2020-10-15 NOTE — PHYSICAL EXAM
[Well developed] : well developed [No Acute Distress] : in no acute distress [Good Hygeine] : demonstrates good hygeine [Well Nourished] : ~L well nourished [Oriented x3] : oriented to person, place, and time [Normal Mood/Affect] : mood and affect are normal [Normal Memory] : ~T memory was ~L unimpaired [Tenderness] : ~T no ~M abdominal tenderness observed [Anxiety] : patient is not anxious [Distended] : not distended [None] : no CVA tenderness [Warm and Dry] : was warm and dry to touch [Normal Gait] : gait was normal [Vulvar Atrophy] : vulvar atrophy [No Invol. Movements] : no involuntary movements were seen [Labia Majora] : were normal [Erythematous] : erythema [Labia Minora] : were normal [Cystocele] : a cystocele [Uterine Prolapse] : uterine prolapse [White] : white [Discharge] : a  ~M vaginal discharge was present [Thin] : thin [de-identified] : no visible prolapse or pessary bulging at introitus [Scant] : there was scant vaginal bleeding [FreeTextEntry4] : pessary intact in posterior vault

## 2020-11-18 RX ORDER — HYDROCHLOROTHIAZIDE 25 MG/1
25 TABLET ORAL DAILY
Qty: 30 | Refills: 3 | Status: ACTIVE | COMMUNITY
Start: 2020-08-21 | End: 1900-01-01

## 2020-11-18 RX ORDER — VALSARTAN 320 MG/1
320 TABLET, COATED ORAL DAILY
Qty: 30 | Refills: 3 | Status: ACTIVE | COMMUNITY
Start: 2020-08-21 | End: 1900-01-01

## 2020-12-17 ENCOUNTER — APPOINTMENT (OUTPATIENT)
Dept: UROGYNECOLOGY | Facility: CLINIC | Age: 65
End: 2020-12-17

## 2020-12-28 ENCOUNTER — APPOINTMENT (OUTPATIENT)
Dept: UROGYNECOLOGY | Facility: CLINIC | Age: 65
End: 2020-12-28
Payer: COMMERCIAL

## 2020-12-28 VITALS
WEIGHT: 170 LBS | TEMPERATURE: 98.1 F | SYSTOLIC BLOOD PRESSURE: 136 MMHG | BODY MASS INDEX: 28.32 KG/M2 | DIASTOLIC BLOOD PRESSURE: 78 MMHG | HEIGHT: 65 IN

## 2020-12-28 PROCEDURE — 99213 OFFICE O/P EST LOW 20 MIN: CPT

## 2020-12-28 PROCEDURE — 99072 ADDL SUPL MATRL&STAF TM PHE: CPT

## 2020-12-28 NOTE — PROCEDURE
[Good Fit] : fits well [Pessary Inserted] : inserted [Pessary Washed] : washed [H2O] : H2O [Medication Review] : Medicaiton Review: Patient verbalizes understanding of risks and benefits [Fluid Management] : Fluid Management: patient verbalizes understanding 6-10 cups per day [Bowel Management] : Bowel Management: patient verbalizes understanding of daily dietary fiber intake [Bladder Training] : Bladder Training: Patient given information with verbal understanding [None] : no bleeding [Refit] : refit is not needed [Erosion] : no evidence of erosion [Erythema] : no erythema [Discharge] : no vaginal discharge [Infection] : no evidence of infection [FreeTextEntry1] : Cube #6 [FreeTextEntry3] : Advised OTC Replens vaginal cream PV BIW HS PRN [FreeTextEntry4] : Advised avoid constipation/straining w/ daily fiber/fluid intake and stool softeners daily PRN [FreeTextEntry8] : Con't daily proper pericare

## 2020-12-28 NOTE — DISCUSSION/SUMMARY
[FreeTextEntry1] : Prolapse, atrophy: \par Managed with Cube #6. Pt happy with pessary and wants to continue use. Discussed using vaginal estrogen 2x weekly. Rx prescribed and OTC vaginal moisturizers. Risks and benefits discussed. Pt understands and agrees with plan. All questions answered. Pt to follow up in 3 months or sooner as needed. Instructed pt to call the office if any problems or concerns and she verbalized understanding.\par

## 2020-12-28 NOTE — HISTORY OF PRESENT ILLNESS
[Cystocele (Obstetric)] : no [Uterine Prolapse] : no [Vaginal Wall Prolapse] : no [Rectal Prolapse] : no [Urinary Frequency] : no [Feelings Of Urinary Urgency] : no [Pain During Urination (Dysuria)] : no [Urinary Tract Infection] : no [Constipation Obstructed Defecation] : no [FreeTextEntry1] : Pt w/ hx POP refitted with Cube #6 pessary presents to office today for pessary follow up. She is happy with the use of pessary. Denies any urinary or bowel issues. She has been using daily stool softeners and PRN olive oil. PT reports that she has been using vaginal estrogen 1x/weekly instead of 2x/weekly.  \par

## 2020-12-28 NOTE — PHYSICAL EXAM
[No Acute Distress] : in no acute distress [Well developed] : well developed [Well Nourished] : ~L well nourished [Good Hygeine] : demonstrates good hygeine [Oriented x3] : oriented to person, place, and time [Normal Memory] : ~T memory was ~L unimpaired [Normal Mood/Affect] : mood and affect are normal [Warm and Dry] : was warm and dry to touch [Normal Gait] : gait was normal [No Invol. Movements] : no involuntary movements were seen [Vulvar Atrophy] : vulvar atrophy [Labia Majora] : were normal [Labia Minora] : were normal [Cystocele] : a cystocele [Uterine Prolapse] : uterine prolapse [Chaperone Present] : A chaperone was present in the examining room during all aspects of the physical examination [Normal Appearance] : general appearance was normal [Atrophy] : atrophy [No Bleeding] : there was no active vaginal bleeding [Normal] : no abnormalities [Exam Deferred] : was deferred [Anxiety] : patient is not anxious [Tenderness] : ~T no ~M abdominal tenderness observed [Distended] : not distended [FreeTextEntry4] : no areas of erosion noted on examination

## 2021-01-19 ENCOUNTER — RX RENEWAL (OUTPATIENT)
Age: 66
End: 2021-01-19

## 2021-01-21 ENCOUNTER — INPATIENT (INPATIENT)
Facility: HOSPITAL | Age: 66
LOS: 0 days | Discharge: ANOTHER HOSPITAL | DRG: 871 | End: 2021-01-22
Attending: STUDENT IN AN ORGANIZED HEALTH CARE EDUCATION/TRAINING PROGRAM | Admitting: STUDENT IN AN ORGANIZED HEALTH CARE EDUCATION/TRAINING PROGRAM
Payer: COMMERCIAL

## 2021-01-21 VITALS
DIASTOLIC BLOOD PRESSURE: 73 MMHG | WEIGHT: 199.96 LBS | TEMPERATURE: 99 F | OXYGEN SATURATION: 96 % | RESPIRATION RATE: 18 BRPM | SYSTOLIC BLOOD PRESSURE: 106 MMHG | HEART RATE: 100 BPM

## 2021-01-21 DIAGNOSIS — Z98.89 OTHER SPECIFIED POSTPROCEDURAL STATES: Chronic | ICD-10-CM

## 2021-01-21 DIAGNOSIS — Z90.710 ACQUIRED ABSENCE OF BOTH CERVIX AND UTERUS: Chronic | ICD-10-CM

## 2021-01-21 DIAGNOSIS — R50.9 FEVER, UNSPECIFIED: ICD-10-CM

## 2021-01-21 LAB
ALBUMIN SERPL ELPH-MCNC: 3.8 G/DL — SIGNIFICANT CHANGE UP (ref 3.3–5)
ALP SERPL-CCNC: 66 U/L — SIGNIFICANT CHANGE UP (ref 40–120)
ALT FLD-CCNC: 18 U/L — SIGNIFICANT CHANGE UP (ref 10–45)
ANION GAP SERPL CALC-SCNC: 12 MMOL/L — SIGNIFICANT CHANGE UP (ref 5–17)
AST SERPL-CCNC: 29 U/L — SIGNIFICANT CHANGE UP (ref 10–40)
BASOPHILS # BLD AUTO: 0.02 K/UL — SIGNIFICANT CHANGE UP (ref 0–0.2)
BASOPHILS NFR BLD AUTO: 0.3 % — SIGNIFICANT CHANGE UP (ref 0–2)
BILIRUB SERPL-MCNC: 0.9 MG/DL — SIGNIFICANT CHANGE UP (ref 0.2–1.2)
BUN SERPL-MCNC: 21 MG/DL — SIGNIFICANT CHANGE UP (ref 7–23)
CALCIUM SERPL-MCNC: 8.8 MG/DL — SIGNIFICANT CHANGE UP (ref 8.4–10.5)
CHLORIDE SERPL-SCNC: 101 MMOL/L — SIGNIFICANT CHANGE UP (ref 96–108)
CO2 SERPL-SCNC: 25 MMOL/L — SIGNIFICANT CHANGE UP (ref 22–31)
CREAT SERPL-MCNC: 1.19 MG/DL — SIGNIFICANT CHANGE UP (ref 0.5–1.3)
EOSINOPHIL # BLD AUTO: 0.14 K/UL — SIGNIFICANT CHANGE UP (ref 0–0.5)
EOSINOPHIL NFR BLD AUTO: 2 % — SIGNIFICANT CHANGE UP (ref 0–6)
GAS PNL BLDV: SIGNIFICANT CHANGE UP
GLUCOSE SERPL-MCNC: 98 MG/DL — SIGNIFICANT CHANGE UP (ref 70–99)
HCT VFR BLD CALC: 32.4 % — LOW (ref 34.5–45)
HGB BLD-MCNC: 10.3 G/DL — LOW (ref 11.5–15.5)
IMM GRANULOCYTES NFR BLD AUTO: 0.6 % — SIGNIFICANT CHANGE UP (ref 0–1.5)
LIDOCAIN IGE QN: 52 U/L — SIGNIFICANT CHANGE UP (ref 7–60)
LYMPHOCYTES # BLD AUTO: 1.31 K/UL — SIGNIFICANT CHANGE UP (ref 1–3.3)
LYMPHOCYTES # BLD AUTO: 19.2 % — SIGNIFICANT CHANGE UP (ref 13–44)
MCHC RBC-ENTMCNC: 30.6 PG — SIGNIFICANT CHANGE UP (ref 27–34)
MCHC RBC-ENTMCNC: 31.8 GM/DL — LOW (ref 32–36)
MCV RBC AUTO: 96.1 FL — SIGNIFICANT CHANGE UP (ref 80–100)
MONOCYTES # BLD AUTO: 0.7 K/UL — SIGNIFICANT CHANGE UP (ref 0–0.9)
MONOCYTES NFR BLD AUTO: 10.2 % — SIGNIFICANT CHANGE UP (ref 2–14)
NEUTROPHILS # BLD AUTO: 4.62 K/UL — SIGNIFICANT CHANGE UP (ref 1.8–7.4)
NEUTROPHILS NFR BLD AUTO: 67.7 % — SIGNIFICANT CHANGE UP (ref 43–77)
NRBC # BLD: 0 /100 WBCS — SIGNIFICANT CHANGE UP (ref 0–0)
PLATELET # BLD AUTO: 372 K/UL — SIGNIFICANT CHANGE UP (ref 150–400)
POTASSIUM SERPL-MCNC: 3.2 MMOL/L — LOW (ref 3.5–5.3)
POTASSIUM SERPL-SCNC: 3.2 MMOL/L — LOW (ref 3.5–5.3)
PROT SERPL-MCNC: 7.8 G/DL — SIGNIFICANT CHANGE UP (ref 6–8.3)
RBC # BLD: 3.37 M/UL — LOW (ref 3.8–5.2)
RBC # FLD: 14.6 % — HIGH (ref 10.3–14.5)
SARS-COV-2 RNA SPEC QL NAA+PROBE: DETECTED
SODIUM SERPL-SCNC: 138 MMOL/L — SIGNIFICANT CHANGE UP (ref 135–145)
WBC # BLD: 6.83 K/UL — SIGNIFICANT CHANGE UP (ref 3.8–10.5)
WBC # FLD AUTO: 6.83 K/UL — SIGNIFICANT CHANGE UP (ref 3.8–10.5)

## 2021-01-21 PROCEDURE — 71045 X-RAY EXAM CHEST 1 VIEW: CPT | Mod: 26

## 2021-01-21 PROCEDURE — 74177 CT ABD & PELVIS W/CONTRAST: CPT | Mod: 26,MA

## 2021-01-21 RX ORDER — SODIUM CHLORIDE 9 MG/ML
1000 INJECTION, SOLUTION INTRAVENOUS ONCE
Refills: 0 | Status: COMPLETED | OUTPATIENT
Start: 2021-01-21 | End: 2021-01-21

## 2021-01-21 RX ORDER — POTASSIUM CHLORIDE 20 MEQ
40 PACKET (EA) ORAL ONCE
Refills: 0 | Status: COMPLETED | OUTPATIENT
Start: 2021-01-21 | End: 2021-01-21

## 2021-01-21 RX ORDER — AZITHROMYCIN 500 MG/1
500 TABLET, FILM COATED ORAL ONCE
Refills: 0 | Status: COMPLETED | OUTPATIENT
Start: 2021-01-21 | End: 2021-01-21

## 2021-01-21 RX ORDER — FAMOTIDINE 10 MG/ML
20 INJECTION INTRAVENOUS ONCE
Refills: 0 | Status: COMPLETED | OUTPATIENT
Start: 2021-01-21 | End: 2021-01-21

## 2021-01-21 RX ORDER — ACETAMINOPHEN 500 MG
975 TABLET ORAL ONCE
Refills: 0 | Status: COMPLETED | OUTPATIENT
Start: 2021-01-21 | End: 2021-01-21

## 2021-01-21 RX ORDER — ONDANSETRON 8 MG/1
4 TABLET, FILM COATED ORAL ONCE
Refills: 0 | Status: COMPLETED | OUTPATIENT
Start: 2021-01-21 | End: 2021-01-21

## 2021-01-21 RX ORDER — SODIUM CHLORIDE 9 MG/ML
1000 INJECTION INTRAMUSCULAR; INTRAVENOUS; SUBCUTANEOUS ONCE
Refills: 0 | Status: DISCONTINUED | OUTPATIENT
Start: 2021-01-21 | End: 2021-01-21

## 2021-01-21 RX ORDER — CEFTRIAXONE 500 MG/1
1000 INJECTION, POWDER, FOR SOLUTION INTRAMUSCULAR; INTRAVENOUS ONCE
Refills: 0 | Status: COMPLETED | OUTPATIENT
Start: 2021-01-21 | End: 2021-01-21

## 2021-01-21 RX ADMIN — Medication 975 MILLIGRAM(S): at 18:42

## 2021-01-21 RX ADMIN — FAMOTIDINE 20 MILLIGRAM(S): 10 INJECTION INTRAVENOUS at 18:42

## 2021-01-21 RX ADMIN — CEFTRIAXONE 100 MILLIGRAM(S): 500 INJECTION, POWDER, FOR SOLUTION INTRAMUSCULAR; INTRAVENOUS at 18:41

## 2021-01-21 RX ADMIN — AZITHROMYCIN 250 MILLIGRAM(S): 500 TABLET, FILM COATED ORAL at 21:23

## 2021-01-21 RX ADMIN — Medication 30 MILLILITER(S): at 18:42

## 2021-01-21 RX ADMIN — SODIUM CHLORIDE 1000 MILLILITER(S): 9 INJECTION, SOLUTION INTRAVENOUS at 19:10

## 2021-01-21 RX ADMIN — Medication 40 MILLIEQUIVALENT(S): at 20:29

## 2021-01-21 RX ADMIN — ONDANSETRON 4 MILLIGRAM(S): 8 TABLET, FILM COATED ORAL at 18:42

## 2021-01-21 NOTE — ED ADULT NURSE NOTE - OBJECTIVE STATEMENT
66 yo presents to the ED from PMD by EMS s/p near syncopal episode. pt has history of HIV (undetectable viral load), breast CA in remission, htn. pt reports 1 week of dec PO/loss of appetite, occasional n/v/d, intermittent generalized abd pain across upper abd, orthostatic lightheadedness, and foul smelling urine. No fever, back pain, or blood in stool. Was at PCP office today for sx and had +ua and had difficulty ambulating around office d/t gen weakness and pcp noticed that pt was sitting down and appeared lethargic w/ eyes closed but responded appropriately to questions and became more alert after sternal rubbing, pcp did not witness syncope/confusion as pt was always conscious and answering questions. pt arrives to ED A&Ox4, dizzy. no neuro deficits.

## 2021-01-21 NOTE — ED ADULT NURSE REASSESSMENT NOTE - NS ED NURSE REASSESS COMMENT FT1
Pt received from [facility] [place]. Pt remains in the ED. Resting comfortably in bed. NAD. AOx4. Breathing unlabored and spontaneously on room air. No peripheral edema. Peripheral pulses strong bilaterally. Pt 96/61, Pt denies lightheaded or dizziness. MD Sherwood made aware. LR bolus given.  Abdomen soft, nontender and nondistended. Positive bowel sounds in all 4 quadrants. Pt safety maintained. Awaiting dispo. Will continue to monitor.

## 2021-01-21 NOTE — ED PROVIDER NOTE - NS ED ROS FT
CONSTITUTIONAL: No fevers, no chills, no dizziness  EYES: no visual changes, no eye pain  EARS: no ear drainage, no ear pain, no change in hearing  NOSE: no nasal congestion  MOUTH/THROAT: no sore throat  CV: No chest pain, no palpitations  RESP: No SOB, no cough  GI: see hpi  : no dysuria, no hematuria, no flank pain  MSK: no back pain, no extremity pain  SKIN: no rashes  NEURO: no headache, no focal weakness, no decreased sensation/paresthesias   PSYCHIATRIC: no known mental health issues.

## 2021-01-21 NOTE — ED PROVIDER NOTE - CLINICAL SUMMARY MEDICAL DECISION MAKING FREE TEXT BOX
Fever (in ED), foul smelling urine, dec PO, GI symptoms, orthostatic symptoms sent by pcp not for snycopal episode but for lethargy seen in office likely 2/2 hypovolemia + infx. Will get infx w/u, abx, and admit for continued care

## 2021-01-21 NOTE — ED PROVIDER NOTE - PROGRESS NOTE DETAILS
Ruperto Sims DO PGY3 - spoke to pcp clarifying presenting complaint and is now reflected in HPI Resident Kaela: Prelim eval includes likely infectious etiology and dehydration as the foci of patient's hypotension. Pt appropriately covered Ctx and Azithro, resolution of hypotension with 1 L LR. Cultures obtained. Case endorsed to [facility] Hospitalist (Marivel Kingsley DO) via Dr. Chirinos. Stable for floors at present. Repeat Vitals stable, demonstrating adequate resuscitation for sepsis with fluids, abx, cultures.

## 2021-01-21 NOTE — ED PROVIDER NOTE - PHYSICAL EXAMINATION
Gen: NAD, fluid speech, AAOx4  Head: NCAT  HEENT: PERRL, dry mucus membranes, normal conjunctiva, anicteric, neck supple  Lung: CTAB, no adventitious sounds  CV: mildly tachycardic, no murmurs  Abd: soft, NTND, no rebound or guarding, no CVAT  MSK: No edema, no visible deformities  Neuro: Moving all extremity grossly no focal abnormalities  Skin: Warm and dry, no evidence of rash

## 2021-01-21 NOTE — ED PROVIDER NOTE - ATTENDING CONTRIBUTION TO CARE
Attending MD Ortega:  I personally have seen and examined this patient.  Resident note reviewed and agree on plan of care and except where noted.  See HPI, PE, and MDM for details.       66yo F hx HIV (undetectable viral load), breast CA in remission, htn p/w 1 week of dec PO/loss of appetite, occasional n/v/d, intermittent generalized abd pain across upper abd, orthostatic lightheadedness, and foul smelling urine. Ddx includes UTI/pyelo, colitis, lyte derangements. Plan for labs, cultures, CT a/p given HIV, IV fluids, admission

## 2021-01-21 NOTE — ED ADULT NURSE NOTE - NSIMPLEMENTINTERV_GEN_ALL_ED
Implemented All Universal Safety Interventions:  [place] to call system. Call bell, personal items and telephone within reach. Instruct patient to call for assistance. Room bathroom lighting operational. Non-slip footwear when patient is off stretcher. Physically safe environment: no spills, clutter or unnecessary equipment. Stretcher in lowest position, wheels locked, appropriate side rails in place.

## 2021-01-22 ENCOUNTER — INPATIENT (INPATIENT)
Facility: HOSPITAL | Age: 66
LOS: 2 days | Discharge: HOME | End: 2021-01-25
Attending: INTERNAL MEDICINE | Admitting: INTERNAL MEDICINE

## 2021-01-22 ENCOUNTER — TRANSCRIPTION ENCOUNTER (OUTPATIENT)
Age: 66
End: 2021-01-22

## 2021-01-22 VITALS
RESPIRATION RATE: 18 BRPM | SYSTOLIC BLOOD PRESSURE: 121 MMHG | HEART RATE: 87 BPM | TEMPERATURE: 98 F | DIASTOLIC BLOOD PRESSURE: 64 MMHG | OXYGEN SATURATION: 100 %

## 2021-01-22 VITALS
HEART RATE: 91 BPM | SYSTOLIC BLOOD PRESSURE: 109 MMHG | DIASTOLIC BLOOD PRESSURE: 58 MMHG | OXYGEN SATURATION: 98 % | TEMPERATURE: 98 F | RESPIRATION RATE: 17 BRPM

## 2021-01-22 DIAGNOSIS — J18.9 PNEUMONIA, UNSPECIFIED ORGANISM: ICD-10-CM

## 2021-01-22 DIAGNOSIS — U07.1 COVID-19: ICD-10-CM

## 2021-01-22 DIAGNOSIS — Z98.89 OTHER SPECIFIED POSTPROCEDURAL STATES: Chronic | ICD-10-CM

## 2021-01-22 DIAGNOSIS — A41.9 SEPSIS, UNSPECIFIED ORGANISM: ICD-10-CM

## 2021-01-22 DIAGNOSIS — R09.89 OTHER SPECIFIED SYMPTOMS AND SIGNS INVOLVING THE CIRCULATORY AND RESPIRATORY SYSTEMS: ICD-10-CM

## 2021-01-22 DIAGNOSIS — C50.919 MALIGNANT NEOPLASM OF UNSPECIFIED SITE OF UNSPECIFIED FEMALE BREAST: Chronic | ICD-10-CM

## 2021-01-22 DIAGNOSIS — E87.70 FLUID OVERLOAD, UNSPECIFIED: ICD-10-CM

## 2021-01-22 DIAGNOSIS — Z90.710 ACQUIRED ABSENCE OF BOTH CERVIX AND UTERUS: Chronic | ICD-10-CM

## 2021-01-22 DIAGNOSIS — B20 HUMAN IMMUNODEFICIENCY VIRUS [HIV] DISEASE: ICD-10-CM

## 2021-01-22 DIAGNOSIS — Z29.9 ENCOUNTER FOR PROPHYLACTIC MEASURES, UNSPECIFIED: ICD-10-CM

## 2021-01-22 DIAGNOSIS — I10 ESSENTIAL (PRIMARY) HYPERTENSION: ICD-10-CM

## 2021-01-22 DIAGNOSIS — J45.909 UNSPECIFIED ASTHMA, UNCOMPLICATED: ICD-10-CM

## 2021-01-22 LAB
ALBUMIN SERPL ELPH-MCNC: 3.3 G/DL — SIGNIFICANT CHANGE UP (ref 3.3–5)
ALP SERPL-CCNC: 56 U/L — SIGNIFICANT CHANGE UP (ref 40–120)
ALT FLD-CCNC: 15 U/L — SIGNIFICANT CHANGE UP (ref 10–45)
ANION GAP SERPL CALC-SCNC: 10 MMOL/L — SIGNIFICANT CHANGE UP (ref 5–17)
AST SERPL-CCNC: 22 U/L — SIGNIFICANT CHANGE UP (ref 10–40)
BASOPHILS # BLD AUTO: 0.03 K/UL — SIGNIFICANT CHANGE UP (ref 0–0.2)
BASOPHILS NFR BLD AUTO: 0.5 % — SIGNIFICANT CHANGE UP (ref 0–2)
BILIRUB SERPL-MCNC: 0.8 MG/DL — SIGNIFICANT CHANGE UP (ref 0.2–1.2)
BUN SERPL-MCNC: 14 MG/DL — SIGNIFICANT CHANGE UP (ref 7–23)
CALCIUM SERPL-MCNC: 8.5 MG/DL — SIGNIFICANT CHANGE UP (ref 8.4–10.5)
CHLORIDE SERPL-SCNC: 105 MMOL/L — SIGNIFICANT CHANGE UP (ref 96–108)
CO2 SERPL-SCNC: 25 MMOL/L — SIGNIFICANT CHANGE UP (ref 22–31)
CREAT SERPL-MCNC: 1.1 MG/DL — SIGNIFICANT CHANGE UP (ref 0.5–1.3)
CRP SERPL-MCNC: 5.05 MG/DL — HIGH (ref 0–0.4)
EOSINOPHIL # BLD AUTO: 0.28 K/UL — SIGNIFICANT CHANGE UP (ref 0–0.5)
EOSINOPHIL NFR BLD AUTO: 5.1 % — SIGNIFICANT CHANGE UP (ref 0–6)
FERRITIN SERPL-MCNC: 529 NG/ML — HIGH (ref 15–150)
GLUCOSE SERPL-MCNC: 93 MG/DL — SIGNIFICANT CHANGE UP (ref 70–99)
HCT VFR BLD CALC: 28.3 % — LOW (ref 34.5–45)
HCV AB S/CO SERPL IA: 0.16 S/CO — SIGNIFICANT CHANGE UP (ref 0–0.99)
HCV AB SERPL-IMP: SIGNIFICANT CHANGE UP
HGB BLD-MCNC: 8.8 G/DL — LOW (ref 11.5–15.5)
IMM GRANULOCYTES NFR BLD AUTO: 0.5 % — SIGNIFICANT CHANGE UP (ref 0–1.5)
LYMPHOCYTES # BLD AUTO: 1.24 K/UL — SIGNIFICANT CHANGE UP (ref 1–3.3)
LYMPHOCYTES # BLD AUTO: 22.6 % — SIGNIFICANT CHANGE UP (ref 13–44)
MAGNESIUM SERPL-MCNC: 2.3 MG/DL — SIGNIFICANT CHANGE UP (ref 1.6–2.6)
MCHC RBC-ENTMCNC: 30.3 PG — SIGNIFICANT CHANGE UP (ref 27–34)
MCHC RBC-ENTMCNC: 31.1 GM/DL — LOW (ref 32–36)
MCV RBC AUTO: 97.6 FL — SIGNIFICANT CHANGE UP (ref 80–100)
MONOCYTES # BLD AUTO: 0.72 K/UL — SIGNIFICANT CHANGE UP (ref 0–0.9)
MONOCYTES NFR BLD AUTO: 13.1 % — SIGNIFICANT CHANGE UP (ref 2–14)
NEUTROPHILS # BLD AUTO: 3.19 K/UL — SIGNIFICANT CHANGE UP (ref 1.8–7.4)
NEUTROPHILS NFR BLD AUTO: 58.2 % — SIGNIFICANT CHANGE UP (ref 43–77)
NRBC # BLD: 0 /100 WBCS — SIGNIFICANT CHANGE UP (ref 0–0)
PLATELET # BLD AUTO: 339 K/UL — SIGNIFICANT CHANGE UP (ref 150–400)
POTASSIUM SERPL-MCNC: 4 MMOL/L — SIGNIFICANT CHANGE UP (ref 3.5–5.3)
POTASSIUM SERPL-SCNC: 4 MMOL/L — SIGNIFICANT CHANGE UP (ref 3.5–5.3)
PROCALCITONIN SERPL-MCNC: 0.07 NG/ML — SIGNIFICANT CHANGE UP (ref 0.02–0.1)
PROT SERPL-MCNC: 6.6 G/DL — SIGNIFICANT CHANGE UP (ref 6–8.3)
RBC # BLD: 2.9 M/UL — LOW (ref 3.8–5.2)
RBC # FLD: 14.6 % — HIGH (ref 10.3–14.5)
SARS-COV-2 IGG SERPL QL IA: NEGATIVE — SIGNIFICANT CHANGE UP
SARS-COV-2 IGM SERPL IA-ACNC: 1.3 INDEX — SIGNIFICANT CHANGE UP
SODIUM SERPL-SCNC: 140 MMOL/L — SIGNIFICANT CHANGE UP (ref 135–145)
WBC # BLD: 5.49 K/UL — SIGNIFICANT CHANGE UP (ref 3.8–10.5)
WBC # FLD AUTO: 5.49 K/UL — SIGNIFICANT CHANGE UP (ref 3.8–10.5)

## 2021-01-22 PROCEDURE — 96374 THER/PROPH/DIAG INJ IV PUSH: CPT | Mod: XU

## 2021-01-22 PROCEDURE — 83690 ASSAY OF LIPASE: CPT

## 2021-01-22 PROCEDURE — 85025 COMPLETE CBC W/AUTO DIFF WBC: CPT

## 2021-01-22 PROCEDURE — 74177 CT ABD & PELVIS W/CONTRAST: CPT

## 2021-01-22 PROCEDURE — 71045 X-RAY EXAM CHEST 1 VIEW: CPT

## 2021-01-22 PROCEDURE — 84145 PROCALCITONIN (PCT): CPT

## 2021-01-22 PROCEDURE — U0005: CPT

## 2021-01-22 PROCEDURE — 80053 COMPREHEN METABOLIC PANEL: CPT

## 2021-01-22 PROCEDURE — 82947 ASSAY GLUCOSE BLOOD QUANT: CPT

## 2021-01-22 PROCEDURE — 85014 HEMATOCRIT: CPT

## 2021-01-22 PROCEDURE — 83605 ASSAY OF LACTIC ACID: CPT

## 2021-01-22 PROCEDURE — 86769 SARS-COV-2 COVID-19 ANTIBODY: CPT

## 2021-01-22 PROCEDURE — 82565 ASSAY OF CREATININE: CPT

## 2021-01-22 PROCEDURE — 85018 HEMOGLOBIN: CPT

## 2021-01-22 PROCEDURE — 84132 ASSAY OF SERUM POTASSIUM: CPT

## 2021-01-22 PROCEDURE — 83735 ASSAY OF MAGNESIUM: CPT

## 2021-01-22 PROCEDURE — 96375 TX/PRO/DX INJ NEW DRUG ADDON: CPT

## 2021-01-22 PROCEDURE — 99285 EMERGENCY DEPT VISIT HI MDM: CPT | Mod: 25

## 2021-01-22 PROCEDURE — 82728 ASSAY OF FERRITIN: CPT

## 2021-01-22 PROCEDURE — 84295 ASSAY OF SERUM SODIUM: CPT

## 2021-01-22 PROCEDURE — 87040 BLOOD CULTURE FOR BACTERIA: CPT

## 2021-01-22 PROCEDURE — 82803 BLOOD GASES ANY COMBINATION: CPT

## 2021-01-22 PROCEDURE — 82435 ASSAY OF BLOOD CHLORIDE: CPT

## 2021-01-22 PROCEDURE — 99223 1ST HOSP IP/OBS HIGH 75: CPT

## 2021-01-22 PROCEDURE — 82330 ASSAY OF CALCIUM: CPT

## 2021-01-22 PROCEDURE — 82962 GLUCOSE BLOOD TEST: CPT

## 2021-01-22 PROCEDURE — U0003: CPT

## 2021-01-22 PROCEDURE — 86140 C-REACTIVE PROTEIN: CPT

## 2021-01-22 PROCEDURE — 93005 ELECTROCARDIOGRAM TRACING: CPT

## 2021-01-22 PROCEDURE — 86803 HEPATITIS C AB TEST: CPT

## 2021-01-22 RX ORDER — DEXAMETHASONE 0.5 MG/5ML
6 ELIXIR ORAL
Qty: 0 | Refills: 0 | DISCHARGE
Start: 2021-01-22

## 2021-01-22 RX ORDER — HEPARIN SODIUM 5000 [USP'U]/ML
7500 INJECTION INTRAVENOUS; SUBCUTANEOUS EVERY 8 HOURS
Refills: 0 | Status: DISCONTINUED | OUTPATIENT
Start: 2021-01-22 | End: 2021-01-23

## 2021-01-22 RX ORDER — HEPARIN SODIUM 5000 [USP'U]/ML
7500 INJECTION INTRAVENOUS; SUBCUTANEOUS EVERY 8 HOURS
Refills: 0 | Status: DISCONTINUED | OUTPATIENT
Start: 2021-01-22 | End: 2021-01-22

## 2021-01-22 RX ORDER — GUAIFENESIN/DEXTROMETHORPHAN 600MG-30MG
10 TABLET, EXTENDED RELEASE 12 HR ORAL
Qty: 0 | Refills: 0 | DISCHARGE
Start: 2021-01-22

## 2021-01-22 RX ORDER — SODIUM CHLORIDE 9 MG/ML
1000 INJECTION, SOLUTION INTRAVENOUS
Refills: 0 | Status: DISCONTINUED | OUTPATIENT
Start: 2021-01-22 | End: 2021-01-22

## 2021-01-22 RX ORDER — CEFTRIAXONE 500 MG/1
1000 INJECTION, POWDER, FOR SOLUTION INTRAMUSCULAR; INTRAVENOUS EVERY 24 HOURS
Refills: 0 | Status: DISCONTINUED | OUTPATIENT
Start: 2021-01-22 | End: 2021-01-22

## 2021-01-22 RX ORDER — HEPARIN SODIUM 5000 [USP'U]/ML
7500 INJECTION INTRAVENOUS; SUBCUTANEOUS
Qty: 0 | Refills: 0 | DISCHARGE
Start: 2021-01-22

## 2021-01-22 RX ORDER — GUAIFENESIN/DEXTROMETHORPHAN 600MG-30MG
10 TABLET, EXTENDED RELEASE 12 HR ORAL EVERY 4 HOURS
Refills: 0 | Status: DISCONTINUED | OUTPATIENT
Start: 2021-01-22 | End: 2021-01-25

## 2021-01-22 RX ORDER — ALBUTEROL 90 UG/1
2 AEROSOL, METERED ORAL EVERY 4 HOURS
Refills: 0 | Status: DISCONTINUED | OUTPATIENT
Start: 2021-01-22 | End: 2021-01-22

## 2021-01-22 RX ORDER — GUAIFENESIN/DEXTROMETHORPHAN 600MG-30MG
10 TABLET, EXTENDED RELEASE 12 HR ORAL EVERY 4 HOURS
Refills: 0 | Status: DISCONTINUED | OUTPATIENT
Start: 2021-01-22 | End: 2021-01-22

## 2021-01-22 RX ORDER — BICTEGRAVIR SODIUM, EMTRICITABINE, AND TENOFOVIR ALAFENAMIDE FUMARATE 30; 120; 15 MG/1; MG/1; MG/1
1 TABLET ORAL
Qty: 0 | Refills: 0 | DISCHARGE

## 2021-01-22 RX ORDER — CEFTRIAXONE 500 MG/1
1 INJECTION, POWDER, FOR SOLUTION INTRAMUSCULAR; INTRAVENOUS
Qty: 0 | Refills: 0 | DISCHARGE
Start: 2021-01-22

## 2021-01-22 RX ORDER — DEXAMETHASONE 0.5 MG/5ML
6 ELIXIR ORAL DAILY
Refills: 0 | Status: DISCONTINUED | OUTPATIENT
Start: 2021-01-22 | End: 2021-01-22

## 2021-01-22 RX ORDER — ALBUTEROL 90 UG/1
2 AEROSOL, METERED ORAL EVERY 6 HOURS
Refills: 0 | Status: DISCONTINUED | OUTPATIENT
Start: 2021-01-22 | End: 2021-01-25

## 2021-01-22 RX ORDER — ACETAMINOPHEN 500 MG
650 TABLET ORAL EVERY 4 HOURS
Refills: 0 | Status: DISCONTINUED | OUTPATIENT
Start: 2021-01-22 | End: 2021-01-22

## 2021-01-22 RX ORDER — VALSARTAN 80 MG/1
0 TABLET ORAL
Qty: 0 | Refills: 0 | DISCHARGE

## 2021-01-22 RX ORDER — BICTEGRAVIR SODIUM, EMTRICITABINE, AND TENOFOVIR ALAFENAMIDE FUMARATE 30; 120; 15 MG/1; MG/1; MG/1
1 TABLET ORAL DAILY
Refills: 0 | Status: DISCONTINUED | OUTPATIENT
Start: 2021-01-22 | End: 2021-01-25

## 2021-01-22 RX ORDER — DEXAMETHASONE 0.5 MG/5ML
6 ELIXIR ORAL DAILY
Refills: 0 | Status: DISCONTINUED | OUTPATIENT
Start: 2021-01-22 | End: 2021-01-25

## 2021-01-22 RX ADMIN — Medication 6 MILLIGRAM(S): at 05:13

## 2021-01-22 RX ADMIN — HEPARIN SODIUM 7500 UNIT(S): 5000 INJECTION INTRAVENOUS; SUBCUTANEOUS at 05:13

## 2021-01-22 RX ADMIN — SODIUM CHLORIDE 100 MILLILITER(S): 9 INJECTION, SOLUTION INTRAVENOUS at 01:19

## 2021-01-22 NOTE — H&P ADULT - PROBLEM SELECTOR PLAN 3
Note RLL PNA on CT abd/pelvis. Possibly superinfection on COVID  -Cont. IV ceftriaxone for now  -F/u Procalcitonin  -F/u BCxs

## 2021-01-22 NOTE — DISCHARGE NOTE PROVIDER - HOSPITAL COURSE
65F w/ hx of HIV (reportedly undetectable), HTN, asthma p/w 1 week hx of fatigue found to have Pnuemonia and COVID. Pt states she started to get weak over the past week. Pt called her MD regarding this weakness and came to office to see him at his request. Symptoms include 1 week of decreased PO.  Started with Ceftriaxone and decadron, she was seen by Card, she will be transferred to [facility], spoke to Attending.

## 2021-01-22 NOTE — H&P ADULT - PROBLEM SELECTOR PLAN 5
Reportedly viral load undetectable. Cannot remember CD4 count  -On Bikarvy at home  -Consider ID consult in AM  -f/u cd4, viral load  -Need med rec

## 2021-01-22 NOTE — CONSULT NOTE ADULT - PROBLEM SELECTOR RECOMMENDATION 3
-  -hold anti hypertensive agents given sepsis and hypotension    Homero Moreno D.O.  [phone] -  -hold anti hypertensive agents given sepsis and hypotension    ivf for 1 day. discharge planning in 1-2 days as long as she is hemodynamically stable and is asymptomatic.     Homero Moreno D.O.  [phone]

## 2021-01-22 NOTE — H&P ADULT - ASSESSMENT
65F w/ hx of HIV (reportedly undetectable), HTN, asthma p/w 1 week hx of fatigue found to have likely CAP and also COVID positive.   Pt transferred from [facility] to [facility] per hospital COVID policy.

## 2021-01-22 NOTE — CONSULT NOTE ADULT - ATTENDING COMMENTS
Over the weekend, Dr. Pinky Hernandez will be covering for our group. If you have any questions please reach out at [phone].    Telma Nagel M.D.  [facility], Division of Infectious Diseases  [phone]  After 5pm on weekdays and all day on weekends - please call [phone]

## 2021-01-22 NOTE — H&P ADULT - HISTORY OF PRESENT ILLNESS
65F w/ hx of HIV (reportedly undetectable), HTN, asthma p/w 1 week hx of fatigue found to have Pnuemonia and COVID. Pt states she started to get weak over the past week. Pt called her MD regarding this weakness and came to office to see him at his request. Symptoms include 1 week of decreased PO. Denies any abdominal pain to me but did endorse some to ER. At PCP office she had generalized weakness and PCP noticed  she was lethargic but still responsive. Did not witness syncope. Pt denies fevers, chills, cough, SOB, dysuria.    In ER: Given IV ceftriaxone, azithromycin, 1L LR, tylenol 975mg PO, maalox, pepcid 20mg, zofran 4mg IV, KCl 40meq

## 2021-01-22 NOTE — H&P ADULT - PROBLEM SELECTOR PLAN 5
Reportedly viral load undetectable. Cannot remember CD4 count  -On Bikarvy at home  - ID f/u   -f/u cd4, viral load

## 2021-01-22 NOTE — CONSULT NOTE ADULT - PROBLEM SELECTOR RECOMMENDATION 9
COVID-19-high risk for decompensation EARLY INFLAMMATORY PHASE (7-14 days post symptom onset),    REMDESIVIR-5 day course consider within 10 days of symptom onset. Criteria for use include: • SpO2 < 94% on RA or requiring supplemental oxygen and prior to need for HFNC or mech ventilation • ALT and AST < 10X ULN -weak evidence supporting minimal benefit - Can start if she meets above criteria, can cause LFTs to increase with biktarvey, would have to monitor closely - currently LFTs normal.   STEROIDs recommended AFTER 1st week of symptom onset for any patients that are hypoxic with dexamethasone 6mg  Q-day x 10 days (equivalent to solumedrol 32mg IV or Prednisone 40mg)-higher doses to be considered in more severe disease - hold off for now unless becomes hypoxic  ANTICOAGULATION- prophylactic dosing recommended LMWH 40mg SQ Qday and then full dose to be considered in patients with increased risk for thromboembolic complications if bleeding risks are considered acceptable -heparin for hemodialysis patients,  for high risk patients consider discharge on oral anticoagulation with rivaroxaban (Xarelto) 10mg PO QD or Eliquis (apixaban) 2.5mg PO BID  x 4-6 weeks, ASA in some contexts.   -daily, BMP, CBC w diff to follow NLR and in some contexts daily or periodic D-dimer levels, -other labs to risk stratify or with any decompensation procalcitonin, ferritin,  CRP, ESR, PT/PTT but limit excessive testing   -antibiotics only if there is concern for a bacterial process (noted to be an issue in only a minority on presentation, rec full eval with ferritin procalcitonin ratio (FPR) <1000 suggesting bacterial process (consider proning and tolerating lower oxygen saturation to avoid intubation).
-  -likely cause of her fatigue and near syncope.   -iv fluids  -hold anti hypertensive agents.

## 2021-01-22 NOTE — CONSULT NOTE ADULT - ASSESSMENT
65F w/ hx of HIV (reportedly undetectable), HTN, asthma p/w 1 week hx of fatigue found to have Pnuemonia and COVID.
Patient is a 65 year old female with PMH of HIV (on Biktarvy, reportedly undetectable viral load), right breast cancer in remission, HTN, and asthma who presented with fatigue and poor appetite for a week. Patient found with COVID-19 pneumonia with concern for CAP.   Had tested negative as outpatient x2.    1/21 - +COVID-19 PCR. CXR with left base atelectasis. CTAP with patchy LLL ground glass opacity and minimal RLL ground glass opacity. Given ceftriaxone and azithromycin in the ER, on ceftriaxone.   1/22 - On room air saturating well 94-96%. Afebrile. Remdesivir held as patient not hypoxic, if O2 <94% or requires NC, would start on remdesivir with close LFT monitoring and would start dexamethasone. Procalcitonin negative and CTAP with evidence of b/l base ggo's, would discontinue ceftriaxone - less likely bacterial source.

## 2021-01-22 NOTE — H&P ADULT - PROBLEM SELECTOR PLAN 1
Meets sepsis criteria by fever and HR  -s/p  Empiric IV antibiotics, seen by ID, plan to monitor off abx   -IVF  -F/u BCX and UCx  -see below

## 2021-01-22 NOTE — H&P ADULT - ASSESSMENT
65F w/ hx of HIV (reportedly undetectable), HTN, asthma p/w 1 week hx of fatigue found to have likely CAP and also COVID positive

## 2021-01-22 NOTE — H&P ADULT - PROBLEM SELECTOR PLAN 1
Meets sepsis criteria by fever and HR  -Cont. Empiric IV antibiotics  -IVF  -F/u BCX and UCx  -see below  -Need med rec

## 2021-01-22 NOTE — H&P ADULT - PROBLEM SELECTOR PLAN 2
Discussed risks and benefits of treatment extensively. Pt is amenable to treatment. Sa02 dropped down to 94% on RA at some point during my exam.  -Will start IV dexamethasone  -Holding remdesivir for now given borderline 02 sat, not sure if interaction with dawit  - [facility] ID on the case.  -F/u covid labs  -Isolation precautions

## 2021-01-22 NOTE — CONSULT NOTE ADULT - PROBLEM SELECTOR RECOMMENDATION 2
diagnosed 40 years ago and reports being undetectable. Was on odefsey before, on Biktarvy since 2019 with no issues. Continue on Biktarvy diagnosed 40 years ago and reports being undetectable. Was on Odefsey before, on Biktarvy since 2019 with no issues. Continue on Biktarvy

## 2021-01-22 NOTE — DISCHARGE NOTE PROVIDER - CARE PROVIDER_API CALL
Homero Moreno ()  Internal Medicine  [address] [place] [address], [facility] [address]  [place] [address]  Phone: [phone]  Fax: [phone]  Follow Up Time:

## 2021-01-22 NOTE — CONSULT NOTE ADULT - SUBJECTIVE AND OBJECTIVE BOX
[facility]  _________________________    Patient is a 65y old  Female who presents with a chief complaint of Sepsis, COVID (22 Jan 2021 00:25)      HPI:  65F w/ hx of HIV (reportedly undetectable), HTN, asthma p/w 1 week hx of fatigue found to have Pnuemonia and COVID. Pt states she started to get weak over the past week. Symptoms include 1 week of decreased PO. Denies any abdominal pain to me but did endorse some to ER. At my office she had generalized weakness and was lethargic but still responsive. Pt denies fevers, chills, cough, SOB, dysuria.    In ER: Given IV ceftriaxone, azithromycin, 1L LR, tylenol 975mg PO, maalox, pepcid 20mg, zofran 4mg IV, KCl 40meq       PAST MEDICAL & SURGICAL HISTORY:  Breast cancer    Essential hypertension    Asthma    HIV disease    Breast cancer        MEDICATIONS  (STANDING):  cefTRIAXone   IVPB 1000 milliGRAM(s) IV Intermittent every 24 hours  dexAMETHasone  Injectable 6 milliGRAM(s) IV Push daily  heparin   Injectable 7500 Unit(s) SubCutaneous every 8 hours  lactated ringers. 1000 milliLiter(s) (100 mL/Hr) IV Continuous <Continuous>    MEDICATIONS  (PRN):  acetaminophen   Tablet .. 650 milliGRAM(s) Oral every 4 hours PRN Temp greater or equal to 38.5C (101.3F)  ALBUTerol    90 MICROgram(s) HFA Inhaler 2 Puff(s) Inhalation every 4 hours PRN Shortness of Breath and/or Wheezing  guaifenesin/dextromethorphan  Syrup 10 milliLiter(s) Oral every 4 hours PRN Cough      Allergies    No Known Allergies    Intolerances        Social Histroy: Tobacco- , ETOH-, Illicit Drugs-    T(C): 36.9 (01-22-21 @ 05:02), Max: 38.3 (01-21-21 @ 18:39)  HR: 79 (01-22-21 @ 05:02) (79 - 100)  BP: 102/66 (01-22-21 @ 05:02) (95/62 - 110/58)  RR: 18 (01-22-21 @ 05:02) (15 - 18)  SpO2: 96% (01-22-21 @ 05:02) (94% - 100%)  I&O's Summary      Review of Systems:  Constitutional: [ ] Fever [ ] Chills [ ] Fatigue [ ] Weight change   HEENT: [ ] Blurred vision [ ] Eye Pain [ ] Headache [ ] Runny nose [ ] Sore Throat   Respiratory: [x ] Cough [ ] Wheezing [x ] Shortness of breath  Cardiovascular: [ ] Chest Pain [ ] Palpitations [ ] NUNEZ [ ] PND [ ] Orthopnea  Gastrointestinal: [ ] Abdominal Pain [x ] Diarrhea [ ] Constipation [ ] Hemorrhoids [ ] Nausea [ ] Vomiting  Genitourinary: [ ] Nocturia [ ] Dysuria [ ] Incontinence  Extremities: [ ] Swelling [ ] Joint Pain  Neurologic: [ ] Focal deficit [ ] Paresthesias [ ] Syncope  Lymphatic: [ ] Swelling [ ] Lymphadenopathy   Skin: [ ] Rash [ ] Ecchymoses [ ] Wounds [ ] Lesions  Psychiatry: [ ] Depression [ ] Suicidal/Homicidal Ideation [ ] Anxiety [ ] Sleep Disturbances  [x ] 10 point review of systems is otherwise negative except as mentioned above            [ ]Unable to obtain    PHYSICAL EXAM:  GENERAL: Alert, NAD  NECK: Supple  CHEST/LUNG: Clear to auscultation bilaterally; No wheezes, rales, or rhonchi  HEART: S1 S2 normal, RRR,  No murmurs, rubs, or gallops  ABDOMEN: Soft, Nondistended  EXTREMITIES:  No LE edema.      LABS:                        8.8    5.49  )-----------( 339      ( 22 Jan 2021 05:29 )             28.3     01-22    140  |  105  |  14  ----------------------------<  93  4.0   |  25  |  1.10    Ca    8.5      22 Jan 2021 05:29  Mg     2.3     01-22    TPro  6.6  /  Alb  3.3  /  TBili  0.8  /  DBili  x   /  AST  22  /  ALT  15  /  AlkPhos  56  01-22                  MEDICATIONS  (STANDING):  cefTRIAXone   IVPB 1000 milliGRAM(s) IV Intermittent every 24 hours  dexAMETHasone  Injectable 6 milliGRAM(s) IV Push daily  heparin   Injectable 7500 Unit(s) SubCutaneous every 8 hours  lactated ringers. 1000 milliLiter(s) (100 mL/Hr) IV Continuous <Continuous>    MEDICATIONS  (PRN):  acetaminophen   Tablet .. 650 milliGRAM(s) Oral every 4 hours PRN Temp greater or equal to 38.5C (101.3F)  ALBUTerol    90 MICROgram(s) HFA Inhaler 2 Puff(s) Inhalation every 4 hours PRN Shortness of Breath and/or Wheezing  guaifenesin/dextromethorphan  Syrup 10 milliLiter(s) Oral every 4 hours PRN Cough          RADIOLOGY & ADDITIONAL TESTS:    Cardiology testing:  EKG:  Telemetry:

## 2021-01-22 NOTE — H&P ADULT - NSHPPHYSICALEXAM_GEN_ALL_CORE
Vital Signs Last 24 Hrs  T(C): 37.4 (01-21-21 @ 20:30), Max: 38.3 (01-21-21 @ 18:39)  T(F): 99.4 (01-21-21 @ 20:30), Max: 101 (01-21-21 @ 18:39)  HR: 85 (01-21-21 @ 20:30) (85 - 100)  BP: 110/58 (01-21-21 @ 20:30) (96/61 - 110/58)  BP(mean): 70 (01-21-21 @ 20:30) (70 - 73)  RR: 18 (01-21-21 @ 20:30) (15 - 18)  SpO2: 99% (01-21-21 @ 20:30) (96% - 100%)

## 2021-01-22 NOTE — H&P ADULT - NSHPPHYSICALEXAM_GEN_ALL_CORE
Vital Signs Last 24 Hrs  T(C): 37.4 (01-21-21 @ 20:30), Max: 38.3 (01-21-21 @ 18:39)  T(F): 99.4 (01-21-21 @ 20:30), Max: 101 (01-21-21 @ 18:39)  HR: 85 (01-21-21 @ 20:30) (85 - 100)  BP: 110/58 (01-21-21 @ 20:30) (96/61 - 110/58)  BP(mean): 70 (01-21-21 @ 20:30) (70 - 73)  RR: 18 (01-21-21 @ 20:30) (15 - 18)  SpO2: 99% (01-21-21 @ 20:30) (96% - 100%)    PHYSICAL EXAM:  GENERAL: NAD, well-developed, comfortable, on room air  HEAD:  Atraumatic, Normocephalic  EYES: EOMI, PERRLA, conjunctiva and sclera clear  NECK: Supple, No JVD  CHEST/LUNG: mild decrease breath sounds bilaterally; No wheeze   HEART: Regular rate and rhythm; No murmurs, rubs, or gallops  ABDOMEN: Soft, Nontender, Nondistended; Bowel sounds present  Neuro: AAOx3, no focal weakness, 5/5 b/l extremity strength  EXTREMITIES:  2+ Peripheral Pulses, No clubbing, cyanosis, or edema  SKIN: No rashes or lesions

## 2021-01-22 NOTE — H&P ADULT - NSHPLABSRESULTS_GEN_ALL_CORE
LABS:                        8.8    5.49  )-----------( 339      ( 22 Jan 2021 05:29 )             28.3     01-22    140  |  105  |  14  ----------------------------<  93  4.0   |  25  |  1.10    Ca    8.5      22 Jan 2021 05:29  Mg     2.3     01-22    TPro  6.6  /  Alb  3.3  /  TBili  0.8  /  DBili  x   /  AST  22  /  ALT  15  /  AlkPhos  56  01-22      CAPILLARY BLOOD GLUCOSE      POCT Blood Glucose.: 91 mg/dL (21 Jan 2021 18:11)            RADIOLOGY & ADDITIONAL TESTS:    Imaging Personally Reviewed:  [x] YES  [ ] NO    Consultant(s) Notes Reviewed:  [x] YES  [ ] NO    Care Discussed with Consultants/Other Providers [x] YES  [ ] NO

## 2021-01-22 NOTE — H&P ADULT - PROBLEM SELECTOR PLAN 3
Note RLL PNA on CT abd/pelvis. Possibly superinfection on COVID  -s/p IV ceftriaxone, monitor off abx   -Procalcitonin low  -F/u BCxs

## 2021-01-22 NOTE — H&P ADULT - NSICDXPASTMEDICALHX_GEN_ALL_CORE_FT
PAST MEDICAL HISTORY:  Asthma     Essential hypertension     HIV disease      PAST MEDICAL HISTORY:  Asthma     Breast cancer     Essential hypertension     HIV disease

## 2021-01-22 NOTE — H&P ADULT - PROBLEM SELECTOR PLAN 7
DVT PPx  -Hep subq for now, reassess after GFR DVT PPx  -Hep subq for now, reassess after GFR calculated

## 2021-01-22 NOTE — CONSULT NOTE ADULT - PROBLEM SELECTOR RECOMMENDATION 2
-  -likely cause of her fatigue and near syncope.   -iv fluids  -hold anti hypertensive agents.  -Continue with ceftriaxone.

## 2021-01-22 NOTE — DISCHARGE NOTE PROVIDER - NSDCMRMEDTOKEN_GEN_ALL_CORE_FT
Albuterol (Eqv-ProAir HFA) 90 mcg/inh inhalation aerosol: 2 puff(s) inhaled every 6 hours  Biktarvy oral tablet: 1 tab(s) orally once a day  valsartan:    Albuterol (Eqv-ProAir HFA) 90 mcg/inh inhalation aerosol: 2 puff(s) inhaled every 6 hours  cefTRIAXone 1 g intravenous injection: 1 gram(s) intravenous every 24 hours  dexamethasone: 6 milligram(s) intravenous once a day  guaifenesin-dextromethorphan 100 mg-10 mg/5 mL oral liquid: 10 milliliter(s) orally every 4 hours, As needed, Cough  heparin: 7500 unit(s) subcutaneous every 8 hours

## 2021-01-22 NOTE — H&P ADULT - PROBLEM SELECTOR PLAN 2
Discussed risks and benefits of treatment extensively. Pt is amenable to treatment. Sa02 dropped down to 94% on RA at some point during my exam.  -Will start IV dexamethasone  -Holding remdesivir for now given borderline 02 sat, not sure if interaction with dawit  -Consider ID consult in AM  -F/u covid labs  -Isolation precautions

## 2021-01-22 NOTE — DISCHARGE NOTE PROVIDER - NSDCCPCAREPLAN_GEN_ALL_CORE_FT
PRINCIPAL DISCHARGE DIAGNOSIS  Diagnosis: Sepsis, due to unspecified organism, unspecified whether acute organ dysfunction present  Assessment and Plan of Treatment: cont ceftriaxone, transfer to [facility]      SECONDARY DISCHARGE DIAGNOSES  Diagnosis: Essential hypertension  Assessment and Plan of Treatment: controlled, cont current home meds    Diagnosis: HIV disease  Assessment and Plan of Treatment: stable, need ID consult, transfer to [facility]    Diagnosis: COVID-19  Assessment and Plan of Treatment: stable, cont decadron    Diagnosis: Community acquired pneumonia  Assessment and Plan of Treatment: stable, cont Ceftriaxone, transfer to [facility]     PRINCIPAL DISCHARGE DIAGNOSIS  Diagnosis: Sepsis, due to unspecified organism, unspecified whether acute organ dysfunction present  Assessment and Plan of Treatment: cont ceftriaxone, transfer to [facility]      SECONDARY DISCHARGE DIAGNOSES  Diagnosis: Essential hypertension  Assessment and Plan of Treatment: controlled, cont current home meds    Diagnosis: HIV disease  Assessment and Plan of Treatment: stable,  ID consult called, transfer to [facility]    Diagnosis: COVID-19  Assessment and Plan of Treatment: stable, cont decadron    Diagnosis: Community acquired pneumonia  Assessment and Plan of Treatment: stable, cont Ceftriaxone, transfer to [facility]

## 2021-01-23 DIAGNOSIS — D64.9 ANEMIA, UNSPECIFIED: ICD-10-CM

## 2021-01-23 LAB
ALBUMIN SERPL ELPH-MCNC: 4 G/DL — SIGNIFICANT CHANGE UP (ref 3.3–5)
ALP SERPL-CCNC: 57 U/L — SIGNIFICANT CHANGE UP (ref 40–120)
ALT FLD-CCNC: 18 U/L — SIGNIFICANT CHANGE UP (ref 4–33)
ANION GAP SERPL CALC-SCNC: 11 MMOL/L — SIGNIFICANT CHANGE UP (ref 7–14)
AST SERPL-CCNC: 25 U/L — SIGNIFICANT CHANGE UP (ref 4–32)
BASOPHILS # BLD AUTO: 0.01 K/UL — SIGNIFICANT CHANGE UP (ref 0–0.2)
BASOPHILS NFR BLD AUTO: 0.1 % — SIGNIFICANT CHANGE UP (ref 0–2)
BILIRUB SERPL-MCNC: 0.4 MG/DL — SIGNIFICANT CHANGE UP (ref 0.2–1.2)
BLD GP AB SCN SERPL QL: NEGATIVE — SIGNIFICANT CHANGE UP
BUN SERPL-MCNC: 18 MG/DL — SIGNIFICANT CHANGE UP (ref 7–23)
CALCIUM SERPL-MCNC: 9 MG/DL — SIGNIFICANT CHANGE UP (ref 8.4–10.5)
CHLORIDE SERPL-SCNC: 105 MMOL/L — SIGNIFICANT CHANGE UP (ref 98–107)
CO2 SERPL-SCNC: 25 MMOL/L — SIGNIFICANT CHANGE UP (ref 22–31)
CREAT SERPL-MCNC: 0.96 MG/DL — SIGNIFICANT CHANGE UP (ref 0.5–1.3)
EOSINOPHIL # BLD AUTO: 0 K/UL — SIGNIFICANT CHANGE UP (ref 0–0.5)
EOSINOPHIL NFR BLD AUTO: 0 % — SIGNIFICANT CHANGE UP (ref 0–6)
GLUCOSE SERPL-MCNC: 118 MG/DL — HIGH (ref 70–99)
HCT VFR BLD CALC: 27.5 % — LOW (ref 34.5–45)
HCT VFR BLD CALC: 27.8 % — LOW (ref 34.5–45)
HGB BLD-MCNC: 8.4 G/DL — LOW (ref 11.5–15.5)
HGB BLD-MCNC: 8.5 G/DL — LOW (ref 11.5–15.5)
IANC: 6.42 K/UL — SIGNIFICANT CHANGE UP (ref 1.5–8.5)
IMM GRANULOCYTES NFR BLD AUTO: 0.4 % — SIGNIFICANT CHANGE UP (ref 0–1.5)
LYMPHOCYTES # BLD AUTO: 0.66 K/UL — LOW (ref 1–3.3)
LYMPHOCYTES # BLD AUTO: 9 % — LOW (ref 13–44)
MCHC RBC-ENTMCNC: 29.6 PG — SIGNIFICANT CHANGE UP (ref 27–34)
MCHC RBC-ENTMCNC: 29.8 PG — SIGNIFICANT CHANGE UP (ref 27–34)
MCHC RBC-ENTMCNC: 30.5 GM/DL — LOW (ref 32–36)
MCHC RBC-ENTMCNC: 30.6 GM/DL — LOW (ref 32–36)
MCV RBC AUTO: 96.9 FL — SIGNIFICANT CHANGE UP (ref 80–100)
MCV RBC AUTO: 97.5 FL — SIGNIFICANT CHANGE UP (ref 80–100)
MONOCYTES # BLD AUTO: 0.23 K/UL — SIGNIFICANT CHANGE UP (ref 0–0.9)
MONOCYTES NFR BLD AUTO: 3.1 % — SIGNIFICANT CHANGE UP (ref 2–14)
NEUTROPHILS # BLD AUTO: 6.42 K/UL — SIGNIFICANT CHANGE UP (ref 1.8–7.4)
NEUTROPHILS NFR BLD AUTO: 87.4 % — HIGH (ref 43–77)
NRBC # BLD: 0 /100 WBCS — SIGNIFICANT CHANGE UP
NRBC # BLD: 0 /100 WBCS — SIGNIFICANT CHANGE UP
NRBC # FLD: 0 K/UL — SIGNIFICANT CHANGE UP
NRBC # FLD: 0 K/UL — SIGNIFICANT CHANGE UP
PLATELET # BLD AUTO: 403 K/UL — HIGH (ref 150–400)
PLATELET # BLD AUTO: 424 K/UL — HIGH (ref 150–400)
POTASSIUM SERPL-MCNC: 4.2 MMOL/L — SIGNIFICANT CHANGE UP (ref 3.5–5.3)
POTASSIUM SERPL-SCNC: 4.2 MMOL/L — SIGNIFICANT CHANGE UP (ref 3.5–5.3)
PROT SERPL-MCNC: 7.2 G/DL — SIGNIFICANT CHANGE UP (ref 6–8.3)
RBC # BLD: 2.82 M/UL — LOW (ref 3.8–5.2)
RBC # BLD: 2.87 M/UL — LOW (ref 3.8–5.2)
RBC # FLD: 14.6 % — HIGH (ref 10.3–14.5)
RBC # FLD: 14.8 % — HIGH (ref 10.3–14.5)
RH IG SCN BLD-IMP: POSITIVE — SIGNIFICANT CHANGE UP
SARS-COV-2 IGG SERPL QL IA: POSITIVE
SARS-COV-2 IGM SERPL IA-ACNC: 6.9 INDEX — HIGH
SODIUM SERPL-SCNC: 141 MMOL/L — SIGNIFICANT CHANGE UP (ref 135–145)
WBC # BLD: 7.35 K/UL — SIGNIFICANT CHANGE UP (ref 3.8–10.5)
WBC # BLD: 8.01 K/UL — SIGNIFICANT CHANGE UP (ref 3.8–10.5)
WBC # FLD AUTO: 7.35 K/UL — SIGNIFICANT CHANGE UP (ref 3.8–10.5)
WBC # FLD AUTO: 8.01 K/UL — SIGNIFICANT CHANGE UP (ref 3.8–10.5)

## 2021-01-23 RX ORDER — HEPARIN SODIUM 5000 [USP'U]/ML
5000 INJECTION INTRAVENOUS; SUBCUTANEOUS EVERY 8 HOURS
Refills: 0 | Status: DISCONTINUED | OUTPATIENT
Start: 2021-01-23 | End: 2021-01-25

## 2021-01-23 RX ORDER — SODIUM CHLORIDE 9 MG/ML
1000 INJECTION INTRAMUSCULAR; INTRAVENOUS; SUBCUTANEOUS
Refills: 0 | Status: DISCONTINUED | OUTPATIENT
Start: 2021-01-23 | End: 2021-01-24

## 2021-01-23 RX ADMIN — HEPARIN SODIUM 7500 UNIT(S): 5000 INJECTION INTRAVENOUS; SUBCUTANEOUS at 00:36

## 2021-01-23 RX ADMIN — HEPARIN SODIUM 7500 UNIT(S): 5000 INJECTION INTRAVENOUS; SUBCUTANEOUS at 12:39

## 2021-01-23 RX ADMIN — BICTEGRAVIR SODIUM, EMTRICITABINE, AND TENOFOVIR ALAFENAMIDE FUMARATE 1 TABLET(S): 30; 120; 15 TABLET ORAL at 12:36

## 2021-01-23 RX ADMIN — HEPARIN SODIUM 5000 UNIT(S): 5000 INJECTION INTRAVENOUS; SUBCUTANEOUS at 19:48

## 2021-01-23 RX ADMIN — Medication 6 MILLIGRAM(S): at 06:03

## 2021-01-23 RX ADMIN — HEPARIN SODIUM 7500 UNIT(S): 5000 INJECTION INTRAVENOUS; SUBCUTANEOUS at 06:03

## 2021-01-24 ENCOUNTER — TRANSCRIPTION ENCOUNTER (OUTPATIENT)
Age: 66
End: 2021-01-24

## 2021-01-24 LAB
ALBUMIN SERPL ELPH-MCNC: 3.5 G/DL — SIGNIFICANT CHANGE UP (ref 3.3–5)
ALP SERPL-CCNC: 54 U/L — SIGNIFICANT CHANGE UP (ref 40–120)
ALT FLD-CCNC: 19 U/L — SIGNIFICANT CHANGE UP (ref 4–33)
ANION GAP SERPL CALC-SCNC: 11 MMOL/L — SIGNIFICANT CHANGE UP (ref 7–14)
AST SERPL-CCNC: 20 U/L — SIGNIFICANT CHANGE UP (ref 4–32)
BASOPHILS # BLD AUTO: 0 K/UL — SIGNIFICANT CHANGE UP (ref 0–0.2)
BASOPHILS NFR BLD AUTO: 0 % — SIGNIFICANT CHANGE UP (ref 0–2)
BILIRUB SERPL-MCNC: 0.4 MG/DL — SIGNIFICANT CHANGE UP (ref 0.2–1.2)
BUN SERPL-MCNC: 17 MG/DL — SIGNIFICANT CHANGE UP (ref 7–23)
CALCIUM SERPL-MCNC: 8.8 MG/DL — SIGNIFICANT CHANGE UP (ref 8.4–10.5)
CHLORIDE SERPL-SCNC: 106 MMOL/L — SIGNIFICANT CHANGE UP (ref 98–107)
CO2 SERPL-SCNC: 21 MMOL/L — LOW (ref 22–31)
CREAT SERPL-MCNC: 0.89 MG/DL — SIGNIFICANT CHANGE UP (ref 0.5–1.3)
EOSINOPHIL # BLD AUTO: 0 K/UL — SIGNIFICANT CHANGE UP (ref 0–0.5)
EOSINOPHIL NFR BLD AUTO: 0 % — SIGNIFICANT CHANGE UP (ref 0–6)
FERRITIN SERPL-MCNC: 431 NG/ML — HIGH (ref 15–150)
FOLATE SERPL-MCNC: 18.5 NG/ML — HIGH (ref 3.1–17.5)
GLUCOSE SERPL-MCNC: 106 MG/DL — HIGH (ref 70–99)
HCT VFR BLD CALC: 26.4 % — LOW (ref 34.5–45)
HGB BLD-MCNC: 8.3 G/DL — LOW (ref 11.5–15.5)
IANC: 4.78 K/UL — SIGNIFICANT CHANGE UP (ref 1.5–8.5)
IMM GRANULOCYTES NFR BLD AUTO: 1.2 % — SIGNIFICANT CHANGE UP (ref 0–1.5)
IRON SATN MFR SERPL: 111 UG/DL — SIGNIFICANT CHANGE UP (ref 30–160)
IRON SATN MFR SERPL: 50 % — SIGNIFICANT CHANGE UP (ref 14–50)
LYMPHOCYTES # BLD AUTO: 1.09 K/UL — SIGNIFICANT CHANGE UP (ref 1–3.3)
LYMPHOCYTES # BLD AUTO: 16.7 % — SIGNIFICANT CHANGE UP (ref 13–44)
MAGNESIUM SERPL-MCNC: 1.9 MG/DL — SIGNIFICANT CHANGE UP (ref 1.6–2.6)
MCHC RBC-ENTMCNC: 30.1 PG — SIGNIFICANT CHANGE UP (ref 27–34)
MCHC RBC-ENTMCNC: 31.4 GM/DL — LOW (ref 32–36)
MCV RBC AUTO: 95.7 FL — SIGNIFICANT CHANGE UP (ref 80–100)
MONOCYTES # BLD AUTO: 0.58 K/UL — SIGNIFICANT CHANGE UP (ref 0–0.9)
MONOCYTES NFR BLD AUTO: 8.9 % — SIGNIFICANT CHANGE UP (ref 2–14)
NEUTROPHILS # BLD AUTO: 4.78 K/UL — SIGNIFICANT CHANGE UP (ref 1.8–7.4)
NEUTROPHILS NFR BLD AUTO: 73.2 % — SIGNIFICANT CHANGE UP (ref 43–77)
NRBC # BLD: 0 /100 WBCS — SIGNIFICANT CHANGE UP
NRBC # FLD: 0 K/UL — SIGNIFICANT CHANGE UP
PHOSPHATE SERPL-MCNC: 2.3 MG/DL — LOW (ref 2.5–4.5)
PLATELET # BLD AUTO: 471 K/UL — HIGH (ref 150–400)
POTASSIUM SERPL-MCNC: 4.2 MMOL/L — SIGNIFICANT CHANGE UP (ref 3.5–5.3)
POTASSIUM SERPL-SCNC: 4.2 MMOL/L — SIGNIFICANT CHANGE UP (ref 3.5–5.3)
PROT SERPL-MCNC: 6.7 G/DL — SIGNIFICANT CHANGE UP (ref 6–8.3)
RBC # BLD: 2.76 M/UL — LOW (ref 3.8–5.2)
RBC # FLD: 15 % — HIGH (ref 10.3–14.5)
SODIUM SERPL-SCNC: 138 MMOL/L — SIGNIFICANT CHANGE UP (ref 135–145)
TIBC SERPL-MCNC: 220 UG/DL — SIGNIFICANT CHANGE UP (ref 220–430)
TRANSFERRIN SERPL-MCNC: 174 MG/DL — LOW (ref 200–360)
UIBC SERPL-MCNC: 109 UG/DL — LOW (ref 110–370)
VIT B12 SERPL-MCNC: 2000 PG/ML — HIGH (ref 200–900)
WBC # BLD: 6.53 K/UL — SIGNIFICANT CHANGE UP (ref 3.8–10.5)
WBC # FLD AUTO: 6.53 K/UL — SIGNIFICANT CHANGE UP (ref 3.8–10.5)

## 2021-01-24 RX ADMIN — HEPARIN SODIUM 5000 UNIT(S): 5000 INJECTION INTRAVENOUS; SUBCUTANEOUS at 05:10

## 2021-01-24 RX ADMIN — Medication 6 MILLIGRAM(S): at 05:09

## 2021-01-24 RX ADMIN — SODIUM CHLORIDE 75 MILLILITER(S): 9 INJECTION INTRAMUSCULAR; INTRAVENOUS; SUBCUTANEOUS at 00:38

## 2021-01-24 NOTE — PROGRESS NOTE ADULT - PROBLEM SELECTOR PLAN 4
Note RLL PNA on CT abd/pelvis but more likely GGOs. PNA ruled out.   -s/p IV ceftriaxone, monitor off abx per ID  -Procalcitonin low  -neg BCxs
Note RLL PNA on CT abd/pelvis but more likely GGOs. PNA ruled out.   -s/p IV ceftriaxone, monitor off abx per ID  -Procalcitonin low  -neg BCxs

## 2021-01-24 NOTE — DISCHARGE NOTE PROVIDER - NSDCCPCAREPLAN_GEN_ALL_CORE_FT
PRINCIPAL DISCHARGE DIAGNOSIS  Diagnosis: COVID-19  Assessment and Plan of Treatment: You have been diagnosed with the COVID-19 virus during your hospital stay. You must self quarantine to complete a 14 day time period.  Monitor for fevers, shortness of breath and cough primarily.  Monitor your temperature daily to not any changes and increases.    It has been determined that you no longer need hospitalization and can recover while remaining in self-quarantine at home. You should follow the prevention steps below until a healthcare provider or local or state health department says you can return to your normal activities.  1. You should restrict activities outside your home, except for getting medical care.  2. Do not go to work, school, or public areas.  3. Avoid using public transportation, ride-sharing, or taxis.  4. Separate yourself from other people and animals in your home.  5. Call ahead before visiting your doctor.  6. Wear a facemask.  7. Cover your coughs and sneezes.  8. Clean your hands often.  9. Avoid sharing personal household items.  10. Clean all “high-touch” surfaces everyday.  11. Monitor your symptoms.  If you have a medical emergency and need to call 911, notify the dispatch personnel that you have COVID-19 If possible, put on a facemask before emergency medical services arrive.  12. Stopping home isolation.  Patients with confirmed COVID-19 should remain under home isolation precautions for 14 days since the positive COVID-19 test and until the risk of secondary transmission to others is thought to be low. The decision to discontinue home isolation precautions should be made on a case-by-case basis, in consultation with healthcare providers and state and local health departments. Your [facility] Department of Health can be reached at [phone] for further information about COVID-19.       PRINCIPAL DISCHARGE DIAGNOSIS  Diagnosis: COVID-19  Assessment and Plan of Treatment: You have been diagnosed with the COVID-19 virus during your hospital stay. You must self quarantine to complete a 14 day time period.  Monitor for fevers, shortness of breath and cough primarily.  Monitor your temperature daily to not any changes and increases.    It has been determined that you no longer need hospitalization and can recover while remaining in self-quarantine at home. You should follow the prevention steps below until a healthcare provider or local or state health department says you can return to your normal activities.  1. You should restrict activities outside your home, except for getting medical care.  2. Do not go to work, school, or public areas.  3. Avoid using public transportation, ride-sharing, or taxis.  4. Separate yourself from other people and animals in your home.  5. Call ahead before visiting your doctor.  6. Wear a facemask.  7. Cover your coughs and sneezes.  8. Clean your hands often.  9. Avoid sharing personal household items.  10. Clean all “high-touch” surfaces everyday.  11. Monitor your symptoms.  If you have a medical emergency and need to call 911, notify the dispatch personnel that you have COVID-19 If possible, put on a facemask before emergency medical services arrive.  12. Stopping home isolation.  Patients with confirmed COVID-19 should remain under home isolation precautions for 14 days since the positive COVID-19 test and until the risk of secondary transmission to others is thought to be low. The decision to discontinue home isolation precautions should be made on a case-by-case basis, in consultation with healthcare providers and state and local health departments. Your [facility] Department of Health can be reached at [phone] for further information about COVID-19.      SECONDARY DISCHARGE DIAGNOSES  Diagnosis: Hypertension  Assessment and Plan of Treatment: Your blood pressure has remained stable and you are not recommended to continue any home blood pressure medications until you are seen by your primary care provider. Monitor for any visual changes, headachesz dizziness, chest pain or shortness of breath and return to ER if any such symptoms arise. Monitor blood pressure regularly. Please follow up with your primary care provider in 1 to 2 weeks    Diagnosis: HIV disease  Assessment and Plan of Treatment: Continue your medications as prescribed and follow up with your outpatient provider in 1 to 2 weeks for further care.    Diagnosis: Asthma  Assessment and Plan of Treatment: Please continue to use your inhalers as prescribed and follow-up with your primary care provider/pulmonologist for further care and annual pulmonary function testings. Avoid smoking or being exposed to second-hand smoke, as well as, other potenital exacerbating triggers (Dust/pollen/pets). Monitor for signs/symptoms of asthma exacerbation, such as, shortness of breath, increased cough, wheezing, difficulty breathing, or fever - Report to the emergency room if symptoms are not relieved by usual regimen.    Diagnosis: Anemia  Assessment and Plan of Treatment: During admission you were found to be anemic, this is likely chronic and you had no signs of bleeding during your hospitilization. Your blood count has remained stable. Please follow-up with your outpatient provider for further monitoring of your blood counts in 1 to 2 weeks. Monitor for signs/symptoms indicating worsening of disease, such as headache, dizziness, blurry vision, easy bleeding/bruising, pale skin, fatigue, dizziness, increased heart rate, or chest pain and return to ER if any such symptoms develop

## 2021-01-24 NOTE — PROGRESS NOTE ADULT - PROBLEM SELECTOR PLAN 5
-Cont. albuterol for now  -On dexamethasone for COVID
-Cont. albuterol for now  -On dexamethasone for COVID

## 2021-01-24 NOTE — PROGRESS NOTE ADULT - PROBLEM SELECTOR PLAN 7
-Trend vital signs  -On Valsartan/HCTZ at home? But she hasn't been taking meds due to soft BP
-Trend vital signs  -On Valsartan/HCTZ at home? But she hasn't been taking meds due to soft BP  - can continue to hold BP meds

## 2021-01-24 NOTE — PROGRESS NOTE ADULT - PROBLEM SELECTOR PLAN 6
Reportedly viral load undetectable. Cannot remember CD4 count  -On Bikarvy at home, can continue.  - ID f/u   -f/u cd4, viral load
Reportedly viral load undetectable. Cannot remember CD4 count  -On Bikarvy at home, can continue.  - ID f/u   -f/u cd4, viral load

## 2021-01-24 NOTE — DISCHARGE NOTE PROVIDER - HOSPITAL COURSE
65 F w/ hx of HIV (reportedly undetectable), HTN, asthma p/w 1 week hx of fatigue found to have likely CAP and also COVID positive.   Pt transferred from [facility] to [facility] per hospital COVID policy.     Sepsis, due to unspecified organism, unspecified whether acute organ dysfunction present.   Meets sepsis criteria by fever and HR (due to COVID)  s/p Empiric IV antibiotics, seen by ID, plan to monitor off abx   low procalcitonin 0.07  IVF x another 24 hrs   neg BCX  see below.     COVID-19.    Discussed risks and benefits of treatment extensively. Pt is amenable to treatment. Sa02 dropped down to 94% on RA at some point during my exam.  started IV dexamethasone  Holding remdesivir for now given borderline 02 sat, not sure if interaction with [facility]  [facility] ID on the case  Isolation precautions  monitor inflammatory markers.     Anemia.    s/p 2L IVF in NS, hgb slight downtrend, no bleeding  likely dilutional from IVF.   no melena, no hematochezia. no BRBPR.   check iron studies,   b12, folic wnl  hx of known anemia per pt. No heavy menstruation. Last colonoscopy 5 years ago, normal. supposed to see GI this year for survelliance colonoscopy. can follow up outpt.     Community acquired pneumonia.    Note RLL PNA on CT abd/pelvis but more likely GGOs. PNA ruled out.   s/p IV ceftriaxone, monitor off abx per ID  Procalcitonin low  neg BCxs.     Asthma, unspecified asthma severity, unspecified whether complicated, unspecified whether persistent.    Cont. albuterol for now  On dexamethasone for COVID.     HIV disease.   Reportedly viral load undetectable. Cannot remember CD4 count  On Lytrokarvy at home, can continue.  ID f/u   f/u cd4, viral load.    Essential hypertension.   On Valsartan/HCTZ at home? But she hasn't been taking meds due to soft BP.     On ___ this case was reviewed with  ____, the patient is medically stable and optimized for discharge. All medications were reviewed and prescriptions were sent to mutually agreed upon pharmacy. 65 F w/ hx of HIV (reportedly undetectable), HTN, asthma p/w 1 week hx of fatigue found to have likely CAP and also COVID positive.   Pt transferred from [facility] to [facility] per hospital COVID policy.     Sepsis, due to unspecified organism, unspecified whether acute organ dysfunction present.    Meets sepsis criteria by fever and HR (due to COVID)  s/p Empiric IV antibiotics, seen by ID, monitor off abx, stable  low procalcitonin 0.07  received IVF x another 24 hrs   neg BCX  see below.     COVID-19.   Discussed risks and benefits of treatment extensively. Pt is amenable to treatment. Sa02 dropped down to 94% on RA at some point on admission, now doing well.  started IV dexamethasone, can complete 10 days course.  Holding remdesivir for now given stable O2 sat on room air  [facility] ID on the case  Isolation precautions  monitor inflammatory markers.     Anemia.    s/p 2L IVF in NS, hgb slight downtrend, no bleeding  likely dilutional from IVF.   no melena, no hematochezia. no BRBPR.   iron studies, b12, folic normal  hx of known anemia per pt. No heavy menstruation. Last colonoscopy 5 years ago, normal. supposed to see GI this year for survelliance colonoscopy. can follow up outpt.  hgb remain stable.     Community acquired pneumonia.   Note RLL PNA on CT abd/pelvis but more likely GGOs. PNA ruled out.   s/p IV ceftriaxone, monitor off abx per ID  Procalcitonin low  neg BCxs.     Asthma, unspecified asthma severity, unspecified whether complicated, unspecified whether persistent.    Cont. albuterol for now  On dexamethasone for COVID.     HIV disease.   Reportedly viral load undetectable. Cannot remember CD4 count  On Bikarvy at home, can continue.  ID f/u   f/u cd4, viral load.    Essential hypertension.    Trend vital signs  On Valsartan/HCTZ at home? But she hasn't been taking meds due to soft BP  can continue to hold BP meds.     On ___ this case was reviewed with  ____, the patient is medically stable and optimized for discharge. All medications were reviewed and prescriptions were sent to mutually agreed upon pharmacy. 65 F w/ hx of HIV (reportedly undetectable), HTN, asthma p/w 1 week hx of fatigue found to have likely CAP and also COVID positive.   Pt transferred from [facility] to [facility] per hospital COVID policy.     Sepsis, due to unspecified organism, unspecified whether acute organ dysfunction present.    Meets sepsis criteria by fever and HR (due to COVID)   s/p Empiric IV antibiotics, seen by ID, monitor off abx, stable   low procalcitonin 0.07   received IVF x another 24 hrs   neg BCX   see below.     COVID-19.   Discussed risks and benefits of treatment extensively. Pt is amenable to treatment. Sa02 dropped down to 94% on RA at some point on admission, now doing well.  started IV dexamethasone, can complete 10 days course.  Holding remdesivir for now given stable O2 sat on room air  [facility] ID on the case  Isolation precautions  monitor inflammatory markers.     Anemia.    s/p 2L IVF in NS, hgb slight downtrend, no bleeding  likely dilutional from IVF.   no melena, no hematochezia. no BRBPR.   iron studies, b12, folic normal  hx of known anemia per pt. No heavy menstruation. Last colonoscopy 5 years ago, normal. supposed to see GI this year for survelliance colonoscopy. can follow up outpt.  hgb remain stable.     Community acquired pneumonia.   Note RLL PNA on CT abd/pelvis but more likely GGOs. PNA ruled out.   s/p IV ceftriaxone, monitor off abx per ID  Procalcitonin low  neg BCxs.     Asthma, unspecified asthma severity, unspecified whether complicated, unspecified whether persistent.    Cont. albuterol for now  On dexamethasone for COVID.     HIV disease.   Reportedly viral load undetectable. Cannot remember CD4 count  On Bikarvy at home, can continue.  ID f/u   f/u cd4, viral load.    Essential hypertension.    Trend vital signs  On Valsartan/HCTZ at home? But she hasn't been taking meds due to soft BP  can continue to hold BP meds.     On 1/25/2021 this case was reviewed with Dr. Soliz, the patient is medically stable and optimized for discharge. All medications were reviewed and prescriptions were sent to mutually agreed upon pharmacy.

## 2021-01-24 NOTE — DISCHARGE NOTE PROVIDER - NSDCMRMEDTOKEN_GEN_ALL_CORE_FT
acetaminophen 325 mg oral tablet: 2 tab(s) orally every 6 hours, As needed, For Temp over 38.3 C (100.94 F)  Albuterol (Eqv-ProAir HFA) 90 mcg/inh inhalation aerosol: 2 puff(s) inhaled every 6 hours  cefTRIAXone: 2 gram(s) intravenous once a day for 4 weeks  cefTRIAXone 1 g intravenous injection: 1 gram(s) intravenous every 24 hours  dexamethasone: 6 milligram(s) intravenous once a day  Diovan:  orally   guaifenesin-dextromethorphan 100 mg-10 mg/5 mL oral liquid: 10 milliliter(s) orally every 4 hours, As needed, Cough  heparin: 7500 unit(s) subcutaneous every 8 hours  Isentress 400 mg oral tablet: 1 tab(s) orally 2 times a day  Truvada 200 mg-300 mg oral tablet: 1 tab(s) orally once a day   acetaminophen 325 mg oral tablet: 2 tab(s) orally every 6 hours, As needed, For Temp over 38.3 C (100.94 F)  Albuterol (Eqv-ProAir HFA) 90 mcg/inh inhalation aerosol: 2 puff(s) inhaled every 6 hours  bictegravir/emtricitabine/tenofovir 50 mg-200 mg-25 mg oral tablet: 1 tab(s) orally once a day  guaifenesin-dextromethorphan 100 mg-10 mg/5 mL oral liquid: 10 milliliter(s) orally every 4 hours, As needed, Cough

## 2021-01-25 ENCOUNTER — TRANSCRIPTION ENCOUNTER (OUTPATIENT)
Age: 66
End: 2021-01-25

## 2021-01-25 VITALS
SYSTOLIC BLOOD PRESSURE: 124 MMHG | OXYGEN SATURATION: 98 % | HEART RATE: 88 BPM | TEMPERATURE: 98 F | DIASTOLIC BLOOD PRESSURE: 61 MMHG | RESPIRATION RATE: 18 BRPM

## 2021-01-25 DIAGNOSIS — J45.909 UNSPECIFIED ASTHMA, UNCOMPLICATED: ICD-10-CM

## 2021-01-25 RX ORDER — GUAIFENESIN/DEXTROMETHORPHAN 600MG-30MG
10 TABLET, EXTENDED RELEASE 12 HR ORAL
Qty: 420 | Refills: 0
Start: 2021-01-25 | End: 2021-01-31

## 2021-01-25 RX ORDER — BICTEGRAVIR SODIUM, EMTRICITABINE, AND TENOFOVIR ALAFENAMIDE FUMARATE 30; 120; 15 MG/1; MG/1; MG/1
1 TABLET ORAL
Qty: 0 | Refills: 0 | DISCHARGE
Start: 2021-01-25

## 2021-01-25 RX ORDER — SODIUM,POTASSIUM PHOSPHATES 278-250MG
1 POWDER IN PACKET (EA) ORAL
Refills: 0 | Status: DISCONTINUED | OUTPATIENT
Start: 2021-01-25 | End: 2021-01-25

## 2021-01-25 RX ADMIN — Medication 6 MILLIGRAM(S): at 06:14

## 2021-01-25 RX ADMIN — HEPARIN SODIUM 5000 UNIT(S): 5000 INJECTION INTRAVENOUS; SUBCUTANEOUS at 06:14

## 2021-01-25 RX ADMIN — Medication 1 PACKET(S): at 09:15

## 2021-01-25 RX ADMIN — Medication 1 PACKET(S): at 14:16

## 2021-01-25 RX ADMIN — BICTEGRAVIR SODIUM, EMTRICITABINE, AND TENOFOVIR ALAFENAMIDE FUMARATE 1 TABLET(S): 30; 120; 15 TABLET ORAL at 14:15

## 2021-01-25 NOTE — DISCHARGE NOTE NURSING/CASE MANAGEMENT/SOCIAL WORK - PATIENT PORTAL LINK FT
You can access the FollowMyHealth Patient Portal offered by [facility] by registering at the following website: [email or web address]. By joining [employer]’s FollowMyHealth portal, you will also be able to view your health information using other applications (apps) compatible with our system.

## 2021-01-25 NOTE — PROGRESS NOTE ADULT - ATTENDING COMMENTS
ID will sign off at this time but remains available for any further questions/concerns.    Telma Nagel M.D.  [facility], Division of Infectious Diseases  [phone]  After 5pm on weekdays and all day on weekends - please call [phone]
dc planning in am if O2 remain stable on room air.  No further need for am labs. Hgb stable.     - Dr. YAYA Soliz ([facility])  - [phone]
- Dr. YAYA Soliz ([facility])  - [phone]

## 2021-01-25 NOTE — PROGRESS NOTE ADULT - PROBLEM SELECTOR PLAN 2
diagnosed 40 years ago and reports being undetectable. Was on Odefsey before, on Biktarvy since 2019 with no issues.   Continue on Biktarvy. Follow up with outpatient HIV/ID.
Discussed risks and benefits of treatment extensively. Pt is amenable to treatment. Sa02 dropped down to 94% on RA at some point on admission, now doing well.  -started IV dexamethasone, can complete 10 days course.  -Holding remdesivir for now given stable O2 sat on room air  - [facility] ID on the case  -Isolation precautions  - monitor inflammatory markers
Discussed risks and benefits of treatment extensively. Pt is amenable to treatment. Sa02 dropped down to 94% on RA at some point during my exam.  -started IV dexamethasone  -Holding remdesivir for now given borderline 02 sat, not sure if interaction with [facility]  - [facility] ID on the case  -Isolation precautions  - monitor inflammatory markers

## 2021-01-25 NOTE — PROGRESS NOTE ADULT - SUBJECTIVE AND OBJECTIVE BOX
[facility] DIVISION of INFECTIOUS DISEASE  Crispin Moore MD PhD, Chantal Brink MD, Ivana Dai MD, Telma Nagel MD  and providing coverage with Pinky Hernandez MD and Slava Sahu MD  Providing Infectious Disease Consultations at [facility], [facility], [facility]'s      Office# [phone] to schedule follow up appointments  Answering Service for urgent calls or New Consults [phone]    Infectious Diseases Progress Note:    DEV SALMON is a 65y year old Female patient    COVID-19 Patient  Patient saturating well on RA, afebrile.  No acute events overnight.  Allergies: No Known Allergies    ANTIBIOTICS/RELEVANT:  Antimicrobials bictegravir 50 mG/emtricitabine 200 mG/tenofovir alafenamide 25 mG (BIKTARVY) 1 Tablet(s) Oral daily    Immunologic:   Other Meds: ALBUTerol    90 MICROgram(s) HFA Inhaler 2 Puff(s) Inhalation every 6 hours PRN  dexAMETHasone  Injectable 6 milliGRAM(s) IV Push daily  guaifenesin/dextromethorphan  Syrup 10 milliLiter(s) Oral every 4 hours PRN  heparin   Injectable 5000 Unit(s) SubCutaneous every 8 hours  potassium phosphate / sodium phosphate Powder (PHOS-NaK) 1 Packet(s) Oral three times a day before meals    Objective:  Vital Signs Last 24 Hrs  T(F): 98.3 (25 Jan 2021 09:33), Max: 98.7 (24 Jan 2021 21:30)  HR: 88 (25 Jan 2021 09:33) (68 - 88)  BP: 124/61 (25 Jan 2021 09:33) (112/70 - 124/61)  RR: 18 (25 Jan 2021 09:33) (18 - 18)  SpO2: 98% (25 Jan 2021 09:33) (97% - 98%)  PHYSICAL EXAM:  HEENT: NC/AT, anicteric, supple  Respiratory: no acc muscle use, breathing comfortably  Cardiovascular: S1 S2 present, normal rate  Gastrointestinal: normal appearing, nondistended  Extremities: no edema, no cyanosis    LABS:                        8.3    6.53  )-----------( 471      ( 24 Jan 2021 17:31 )             26.4     WBC trend:  6.53 01-24 @ 17:31  7.35 01-23 @ 15:17  8.01 01-23 @ 08:03  5.49 01-22 @ 05:29  6.83 01-21 @ 18:33    01-24    138  |  106  |  17  ----------------------------<  106<H>  4.2   |  21<L>  |  0.89    Ca    8.8      24 Jan 2021 17:31  Phos  2.3     01-24  Mg     1.9     01-24    TPro  6.7  /  Alb  3.5  /  TBili  0.4  /  DBili  x   /  AST  20  /  ALT  19  /  AlkPhos  54  01-24    Creatinine, Serum: 0.89 mg/dL (01-24-21 @ 17:31)  Creatinine, Serum: 0.96 mg/dL (01-23-21 @ 21:29)  Creatinine, Serum: 1.10 mg/dL (01-22-21 @ 05:29)  Creatinine, Serum: 1.19 mg/dL (01-21-21 @ 18:33)    COVID RISK SCORE  Auto Neutrophil #: 4.78 K/uL (01-24-21 @ 17:31)  Auto Neutrophil #: 6.42 K/uL (01-23-21 @ 15:17)  Auto Neutrophil #: 3.19 K/uL (01-22-21 @ 05:29)  Auto Neutrophil #: 4.62 K/uL (01-21-21 @ 18:33)    Auto Lymphocyte #: 1.09 K/uL (01-24-21 @ 17:31)  Auto Lymphocyte #: 0.66 K/uL (01-23-21 @ 15:17)  Auto Lymphocyte #: 1.24 K/uL (01-22-21 @ 05:29)  Auto Lymphocyte #: 1.31 K/uL (01-21-21 @ 18:33)    Procalcitonin, Serum: 0.07 ng/mL (01-22-21 @ 05:29)    Ferritin, Serum: 431 ng/mL (01-24-21 @ 07:50)  Ferritin, Serum: 529 ng/mL (01-22-21 @ 08:25    C-Reactive Protein, Serum: 5.05 mg/dL (01-22-21 @ 05:29)    MICROBIOLOGY: reviewed    RADIOLOGY & ADDITIONAL STUDIES: reviewed
SUBJECTIVE / OVERNIGHT EVENTS:  feels better  no cp, no sob, no n/v/d. no abdominal pain.  no headache, no dizziness.   s/p 2L IVF in NS, hgb slight downtrend, no bleeding  likely dilutional  no melena, no hematochezia. no BRBPR.   difficulty with blood draws, clinically dry         --------------------------------------------------------------------------------------------  LABS:                        8.4    7.35  )-----------( 424      ( 23 Jan 2021 15:17 )             27.5     01-22    140  |  105  |  14  ----------------------------<  93  4.0   |  25  |  1.10    Ca    8.5      22 Jan 2021 05:29  Mg     2.3     01-22    TPro  6.6  /  Alb  3.3  /  TBili  0.8  /  DBili  x   /  AST  22  /  ALT  15  /  AlkPhos  56  01-22      CAPILLARY BLOOD GLUCOSE                RADIOLOGY & ADDITIONAL TESTS:    Imaging Personally Reviewed:  [x] YES  [ ] NO    Consultant(s) Notes Reviewed:  [x] YES  [ ] NO    MEDICATIONS  (STANDING):  bictegravir 50 mG/emtricitabine 200 mG/tenofovir alafenamide 25 mG (BIKTARVY) 1 Tablet(s) Oral daily  dexAMETHasone  Injectable 6 milliGRAM(s) IV Push daily  heparin   Injectable 5000 Unit(s) SubCutaneous every 8 hours  sodium chloride 0.9%. 1000 milliLiter(s) (75 mL/Hr) IV Continuous <Continuous>    MEDICATIONS  (PRN):  ALBUTerol    90 MICROgram(s) HFA Inhaler 2 Puff(s) Inhalation every 6 hours PRN Shortness of Breath and/or Wheezing  guaifenesin/dextromethorphan  Syrup 10 milliLiter(s) Oral every 4 hours PRN Cough      Care Discussed with Consultants/Other Providers [x] YES  [ ] NO    Vital Signs Last 24 Hrs  T(C): 37 (23 Jan 2021 11:07), Max: 37 (23 Jan 2021 11:07)  T(F): 98.6 (23 Jan 2021 11:07), Max: 98.6 (23 Jan 2021 11:07)  HR: 86 (23 Jan 2021 11:07) (80 - 86)  BP: 109/64 (23 Jan 2021 11:07) (98/65 - 115/68)  BP(mean): --  RR: 18 (23 Jan 2021 11:07) (18 - 18)  SpO2: 97% (23 Jan 2021 11:07) (97% - 100%)  I&O's Summary    22 Jan 2021 07:01  -  23 Jan 2021 07:00  --------------------------------------------------------  IN: 0 mL / OUT: 500 mL / NET: -500 mL      PHYSICAL EXAM:  GENERAL: NAD, well-developed, comfortable  HEAD:  Atraumatic, Normocephalic  EYES: EOMI, PERRLA, conjunctiva and sclera clear  NECK: Supple, No JVD  CHEST/LUNG: Clear to auscultation bilaterally; No wheeze  HEART: Regular rate and rhythm; No murmurs, rubs, or gallops  ABDOMEN: Soft, Nontender, Nondistended; Bowel sounds present  Neuro: AAOx3, no focal weakness, 5/5 b/l extremity strength  EXTREMITIES:  2+ Peripheral Pulses, No clubbing, cyanosis, or edema  SKIN: No rashes or lesions 
SUBJECTIVE / OVERNIGHT EVENTS:  Pt sitting up out of bed.  feels well.  no chest pain, no shortness of breath.   comfortable. no n/v/d. no abd pain.  no HA/dizziness.   remain stable on room air         --------------------------------------------------------------------------------------------  LABS:                        8.3    6.53  )-----------( 471      ( 24 Jan 2021 17:31 )             26.4     01-24    138  |  106  |  17  ----------------------------<  106<H>  4.2   |  21<L>  |  0.89    Ca    8.8      24 Jan 2021 17:31  Phos  2.3     01-24  Mg     1.9     01-24    TPro  6.7  /  Alb  3.5  /  TBili  0.4  /  DBili  x   /  AST  20  /  ALT  19  /  AlkPhos  54  01-24      CAPILLARY BLOOD GLUCOSE                RADIOLOGY & ADDITIONAL TESTS:    Imaging Personally Reviewed:  [x] YES  [ ] NO    Consultant(s) Notes Reviewed:  [x] YES  [ ] NO    MEDICATIONS  (STANDING):  bictegravir 50 mG/emtricitabine 200 mG/tenofovir alafenamide 25 mG (BIKTARVY) 1 Tablet(s) Oral daily  dexAMETHasone  Injectable 6 milliGRAM(s) IV Push daily  heparin   Injectable 5000 Unit(s) SubCutaneous every 8 hours    MEDICATIONS  (PRN):  ALBUTerol    90 MICROgram(s) HFA Inhaler 2 Puff(s) Inhalation every 6 hours PRN Shortness of Breath and/or Wheezing  guaifenesin/dextromethorphan  Syrup 10 milliLiter(s) Oral every 4 hours PRN Cough      Care Discussed with Consultants/Other Providers [x] YES  [ ] NO    Vital Signs Last 24 Hrs  T(C): 36.9 (23 Jan 2021 21:45), Max: 36.9 (23 Jan 2021 21:45)  T(F): 98.4 (23 Jan 2021 21:45), Max: 98.4 (23 Jan 2021 21:45)  HR: 72 (23 Jan 2021 21:45) (72 - 72)  BP: 109/65 (23 Jan 2021 21:45) (109/65 - 109/65)  BP(mean): --  RR: 18 (23 Jan 2021 21:45) (18 - 18)  SpO2: --  I&O's Summary      PHYSICAL EXAM:  GENERAL: NAD, well-developed, comfortable on room air   HEAD:  Atraumatic, Normocephalic  EYES: EOMI, PERRLA, conjunctiva and sclera clear  NECK: Supple, No JVD  CHEST/LUNG: Clear to auscultation bilaterally; No wheeze  HEART: Regular rate and rhythm; No murmurs, rubs, or gallops  ABDOMEN: Soft, Nontender, Nondistended; Bowel sounds present  Neuro: AAOx3, no focal weakness, 5/5 b/l extremity strength  EXTREMITIES:  2+ Peripheral Pulses, No clubbing, cyanosis, or edema  SKIN: No rashes or lesions

## 2021-01-25 NOTE — PROGRESS NOTE ADULT - ASSESSMENT
65 F w/ hx of HIV (reportedly undetectable), HTN, asthma p/w 1 week hx of fatigue found to have likely CAP and also COVID positive.   Pt transferred from [facility] to [facility] per hospital COVID policy. 
Patient is a 65 year old female with PMH of HIV (on Biktarvy, reportedly undetectable viral load), right breast cancer in remission, HTN, and asthma who presented with fatigue and poor appetite for a week. Patient found with COVID-19 pneumonia with concern for CAP.   Had tested negative as outpatient x2.    1/21 - +COVID-19 PCR. CXR with left base atelectasis. CTAP with patchy LLL ground glass opacity and minimal RLL ground glass opacity. Given ceftriaxone and azithromycin in the ER, on ceftriaxone.   1/22 - On room air saturating well 94-96%. Afebrile. Remdesivir held as patient not hypoxic, if O2 <94% or requires NC, would start on remdesivir with close LFT monitoring and would start dexamethasone. Procalcitonin negative and CTAP with evidence of b/l base ggo's, would discontinue ceftriaxone - less likely bacterial source.   1/25 - on RA saturating well, afebrile. D/C planning per primary team.    Fine to discharge with oxygen (4L or less and able to keep sats>90) and even persistent fever at time of discharge is reasonable. With high risk for thromboembolic disease  consider dc on  rivaroxaban (Xarelto) 10mg PO daily or Eliquis (apixaban) 2.5mg PO BID x 6 weeks post discharge.  For outpt mild disease considered noninfectious at day 10 after onset of illness but for hospitalized patients day 20. If pt going to facility or to dc isolation in house then will require 2 negative PCR tests  by a minimum of 24 hours and fever free without antipyretics for >24hrs and more than 10 days from first positive test. Critical that pt has outpt follow up within 1-2 weeks of discharge.    
65 F w/ hx of HIV (reportedly undetectable), HTN, asthma p/w 1 week hx of fatigue found to have likely CAP and also COVID positive.   Pt transferred from [facility] to [facility] per hospital COVID policy.

## 2021-01-25 NOTE — PROGRESS NOTE ADULT - PROBLEM SELECTOR PLAN 1
Meets sepsis criteria by fever and HR (due to COVID)  -s/p Empiric IV antibiotics, seen by ID, plan to monitor off abx   - low procalcitonin 0.07  -IVF x another 24 hrs   -neg BCX  -see below
COVID-19-high risk for decompensation EARLY INFLAMMATORY PHASE (7-14 days post symptom onset),    REMDESIVIR-5 day course consider within 10 days of symptom onset. Criteria for use include: • SpO2 < 94% on RA or requiring supplemental oxygen and prior to need for HFNC or mech ventilation • ALT and AST < 10X ULN -weak evidence supporting minimal benefit - no indication, does not meet criteria  STEROIDs recommended AFTER 1st week of symptom onset for any patients that are hypoxic with dexamethasone 6mg  Q-day x 10 days (equivalent to solumedrol 32mg IV or Prednisone 40mg)-higher doses to be considered in more severe disease - no indication, saturating well on room air  ANTICOAGULATION- prophylactic dosing recommended LMWH 40mg SQ Qday and then full dose to be considered in patients with increased risk for thromboembolic complications if bleeding risks are considered acceptable -heparin for hemodialysis patients,  for high risk patients consider discharge on oral anticoagulation with rivaroxaban (Xarelto) 10mg PO QD or Eliquis (apixaban) 2.5mg PO BID  x 4-6 weeks, ASA in some contexts.   -daily, BMP, CBC w diff to follow NLR and in some contexts daily or periodic D-dimer levels, -other labs to risk stratify or with any decompensation procalcitonin, ferritin,  CRP, ESR, PT/PTT but limit excessive testing   -antibiotics only if there is concern for a bacterial process (noted to be an issue in only a minority on presentation, rec full eval with ferritin procalcitonin ratio (FPR) <1000 suggesting bacterial process (consider proning and tolerating lower oxygen saturation to avoid intubation).
- Meets sepsis criteria by fever and HR (due to COVID)  - s/p Empiric IV antibiotics, seen by ID, monitor off abx, stable  - low procalcitonin 0.07  - received IVF x another 24 hrs   -neg BCX  -see below

## 2021-01-25 NOTE — PROGRESS NOTE ADULT - PROBLEM SELECTOR PROBLEM 1
Sepsis, due to unspecified organism, unspecified whether acute organ dysfunction present
COVID-19
Sepsis, due to unspecified organism, unspecified whether acute organ dysfunction present

## 2021-01-25 NOTE — PROGRESS NOTE ADULT - PROBLEM SELECTOR PLAN 3
On albuterol, management per primary team
s/p 2L IVF in NS, hgb slight downtrend, no bleeding  likely dilutional from IVF.   no melena, no hematochezia. no BRBPR.   iron studies, b12, folic normal  hx of known anemia per pt. No heavy menstruation. Last colonoscopy 5 years ago, normal. supposed to see GI this year for survelliance colonoscopy. can follow up outpt.  hgb remain stable
s/p 2L IVF in NS, hgb slight downtrend, no bleeding  likely dilutional from IVF.   no melena, no hematochezia. no BRBPR.   check iron studies, b12, folic  hx of known anemia per pt. No heavy menstruation. Last colonoscopy 5 years ago, normal. supposed to see GI this year for survelliance colonoscopy. can follow up outpt.

## 2021-01-27 LAB
CULTURE RESULTS: SIGNIFICANT CHANGE UP
CULTURE RESULTS: SIGNIFICANT CHANGE UP
SPECIMEN SOURCE: SIGNIFICANT CHANGE UP
SPECIMEN SOURCE: SIGNIFICANT CHANGE UP

## 2021-03-29 ENCOUNTER — APPOINTMENT (OUTPATIENT)
Dept: UROGYNECOLOGY | Facility: CLINIC | Age: 66
End: 2021-03-29
Payer: COMMERCIAL

## 2021-03-29 PROCEDURE — 99072 ADDL SUPL MATRL&STAF TM PHE: CPT

## 2021-03-29 PROCEDURE — 99214 OFFICE O/P EST MOD 30 MIN: CPT

## 2021-03-29 NOTE — PROCEDURE
[Good Fit] : fits well [H2O] : H2O [Medication Review] : Medicaiton Review: Patient verbalizes understanding of risks and benefits [Erosion] : evidence of erosion [Erythema] : erythema noted [Pessary Out] : removed [Mild] : mild bleeding [Resolved w/pressure] : was resolved by applying pressure [Refit] : refit is not needed [Discharge] : no vaginal discharge [Infection] : no evidence of infection [FreeTextEntry1] : Cube #6 [FreeTextEntry3] : Advised OTC Replens vaginal cream PV BIW HS PRN. Discussed use of vaginal estrogen 2x/weekly.  [FreeTextEntry8] : Con't daily proper pericare

## 2021-03-29 NOTE — HISTORY OF PRESENT ILLNESS
[Cystocele (Obstetric)] : no [Uterine Prolapse] : no [Vaginal Wall Prolapse] : no [Rectal Prolapse] : no [Urinary Frequency] : no [Feelings Of Urinary Urgency] : no [Pain During Urination (Dysuria)] : no [Urinary Tract Infection] : no [Constipation Obstructed Defecation] : no [FreeTextEntry1] : Landy is a 64 yo w/ hx POP refitted with Cube #6 pessary presents to office today for pessary follow up. She is happy with the use of pessary. She reports that she has been experiencing vaginal bleeding several times in the last few weeks. Denies any urinary or bowel issues. She has not been using the vaginal estrogen regularly. She has a hx of JAMEY due to fibroids. \par

## 2021-03-29 NOTE — PHYSICAL EXAM
[Chaperone Present] : A chaperone was present in the examining room during all aspects of the physical examination [Vulvar Atrophy] : vulvar atrophy [Labia Majora] : were normal [Labia Minora] : were normal [Normal Appearance] : general appearance was normal [Atrophy] : atrophy [Exam Deferred] : was deferred [Normal] : normal external genitalia [Scant] : there was scant vaginal bleeding [Absent] : absent [FreeTextEntry1] : General: Well, appearing. Alert and orientated. No acute distress\par HEENT: Normocephalic, atraumatic and extraocular muscles appear to be intact \par Neck: Full range of motion, no obvious lymphadenopathy, deformities, or masses noted \par Respiratory: Speaking in full sentences comfortably, normal work of breathing and no cough during visit\par Musculoskeletal: active full range of motion in extremities \par Extremities: No upper extremity edema noted\par Skin: no obvious rash or skin lesions\par Neuro: Orientated X 3, speech is fluent, normal rate\par Psych: Normal mood and affect\par  [FreeTextEntry4] : erosion noted on the right lateral aspect of vaginal vault.  [de-identified] : difficult to ascertain due to recent pessary use

## 2021-03-29 NOTE — DISCUSSION/SUMMARY
[FreeTextEntry1] : 1. Prolapse\par Managed with Cube #6. Pt happy with pessary and wants to continue use. \par -Erosion present on today's exam, will proceed with pelvic rest and vaginal estrogen use \par \par 2. Atrophy\par -Discussed importance on regular use of vaginal estrogen 2x/weekly. Rx prescribed. \par \par -RTO in 3 weeks for pessary follow up. Pt understands and agrees with plan. All questions answered. Instructed pt to call the office if any problems or concerns and she verbalized understanding.\par

## 2021-04-19 ENCOUNTER — APPOINTMENT (OUTPATIENT)
Dept: UROGYNECOLOGY | Facility: CLINIC | Age: 66
End: 2021-04-19
Payer: COMMERCIAL

## 2021-04-19 PROCEDURE — 99213 OFFICE O/P EST LOW 20 MIN: CPT

## 2021-04-19 PROCEDURE — 99072 ADDL SUPL MATRL&STAF TM PHE: CPT

## 2021-04-19 RX ORDER — ESTRADIOL 0.1 MG/G
0.1 CREAM VAGINAL
Qty: 2 | Refills: 0 | Status: ACTIVE | COMMUNITY
Start: 2020-12-28 | End: 1900-01-01

## 2021-04-19 NOTE — DISCUSSION/SUMMARY
[FreeTextEntry1] : 1. Prolapse\par -Reinserted Cube #6. Pt happy with pessary and wants to continue use.\par -Continue with pessary self-care.\par -Precaution and instructions were reviewed. \par \par 2. Atrophy\par -Discussed importance on regular use of vaginal estrogen 2x/weekly and she will continue with use.\par \par -RTO in 1 month for pessary follow up. Pt understands and agrees with plan. All questions answered. Instructed pt to call the office if any problems or concerns and she verbalized understanding.\par

## 2021-04-19 NOTE — PHYSICAL EXAM
[Vulvar Atrophy] : vulvar atrophy [Labia Majora] : were normal [Labia Minora] : were normal [Normal Appearance] : general appearance was normal [Atrophy] : atrophy [Cystocele] : a cystocele [No Bleeding] : there was no active vaginal bleeding [Absent] : absent [Normal] : no abnormalities [Exam Deferred] : was deferred [FreeTextEntry1] : General: Well, appearing. Alert and orientated. No acute distress\par HEENT: Normocephalic, atraumatic and extraocular muscles appear to be intact \par Neck: Full range of motion, no obvious lymphadenopathy, deformities, or masses noted \par Respiratory: Speaking in full sentences comfortably, normal work of breathing and no cough during visit\par Musculoskeletal: active full range of motion in extremities \par Extremities: No upper extremity edema noted\par Skin: no obvious rash or skin lesions\par Neuro: Orientated X 3, speech is fluent, normal rate\par Psych: Normal mood and affect\par  [de-identified] : Prolapse noted at vaginal opening, non-protruding [FreeTextEntry4] : oldd erosion site healed.

## 2021-04-19 NOTE — PROCEDURE
[Good Fit] : fits well [Erosion] : evidence of erosion [Erythema] : erythema noted [Pessary Inserted] : inserted [H2O] : H2O [Mild] : mild bleeding [Resolved w/pressure] : was resolved by applying pressure [Medication Review] : Medicaiton Review: Patient verbalizes understanding of risks and benefits [Refit] : refit is not needed [Discharge] : no vaginal discharge [Infection] : no evidence of infection [FreeTextEntry1] : Cube #6 [FreeTextEntry3] : Advised OTC Replens vaginal cream PV BIW HS PRN. Discussed use of vaginal estrogen 2x/weekly.  [FreeTextEntry8] : Con't daily proper pericare

## 2021-04-19 NOTE — HISTORY OF PRESENT ILLNESS
[Cystocele (Obstetric)] : no [Uterine Prolapse] : no [Vaginal Wall Prolapse] : no [Rectal Prolapse] : no [Urinary Frequency] : no [Feelings Of Urinary Urgency] : no [Pain During Urination (Dysuria)] : no [Urinary Tract Infection] : no [Constipation Obstructed Defecation] : no [FreeTextEntry1] : Landy is a 64 yo w/ hx POP had pessary left out since last visit due to erosions.  She RTO to office today to have pessary reinserted as she is uncomfortable without it.  She does pessary self care and will continue to do so.  She is using Estrogen cream as Rx BIW. \par

## 2021-05-24 ENCOUNTER — APPOINTMENT (OUTPATIENT)
Dept: UROGYNECOLOGY | Facility: CLINIC | Age: 66
End: 2021-05-24

## 2021-06-01 ENCOUNTER — RX RENEWAL (OUTPATIENT)
Age: 66
End: 2021-06-01

## 2021-06-29 NOTE — ED PROCEDURE NOTE - PROCEDURE DATE TIME, MLM
Subjective


Progress Note Date: 06/28/21


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 

significant for hypertension and hypertensive cardiovascular disease with left 

ventricular hypertrophy, history of ischemic cardiomyopathy ejection fraction of

20%, non-ST elevation myocardial infarction, moderate mitral regurgitation 

tricuspid regurgitation with severe pulmonary hypertension, diabetes mellitus 

type 2, chronic kidney disease stage IV, anemia, history of thrombocytopenia, 

recently hospitalized at [facility] with non-ST elevation MI as well as

acute systolic heart failure. Patient elected conservative management,discharged

on 06/16/2021 [place] [facility] [place]  Return to [facility] to poor oral intake, black tarry stools, abdominal pain, abnormal labs.  CT

of abdomen and pelvis report pleural effusions, basilar infiltrates, acute CHF 

exacerbation, mild abdominal ascites.





06/20/2021 F/U chest x-ray suggesting acute early CHF.  diuresing well on Lasix 

IV push.  Creatinine up to 2.32 diet intake poor, consumed 0% of breakfast, 0% 

of lunch.  Platelets trending down and currently 89, hematology followed on last

admit and had discussed outpatient bone marrow biopsy, which is still pending.  

Maintaining O2 sats in the 90s on room air.














Objective





- Vital Signs


Vital signs: 


                                   Vital Signs











Temp  97.6 F   06/28/21 14:00


 


Pulse  67   06/28/21 14:00


 


Resp  17   06/28/21 14:00


 


BP  129/79   06/28/21 14:00


 


Pulse Ox  99   06/28/21 14:00








                                 Intake & Output











 06/27/21 06/28/21 06/28/21





 18:59 06:59 18:59


 


Intake Total 10 120 120


 


Balance 10 120 120


 


Weight  63 kg 63 kg


 


Intake:   


 


  Oral 10 120 120


 


Other:   


 


  Voiding Method Diaper Diaper Diaper


 


  # Voids 4 3 


 


  # Bowel Movements 2  














- Exam





PHYSICAL EXAM:


VITAL SIGNS: As above


GENERAL: Sitting up in bed, sleepy, no acute distress, did not touch his 

breakfast tray


HEENT:  Conjunctivae normal. eyes normal.  Oral mucosa dry


NECK:  Supple, No JVD. 


CARDIOVASCULAR:  S1, S2 regular.  Systolic murmur


RESPIRATION: Breath sounds diminished in the bases. No rhonchi or crackles. 


ABDOMEN:  Soft, nondistended, nontender . No guarding. no masses palpable. No 

ascites, No hepatosplenomegaly.Bowel sounds heard.


LEGS:  No edema. no swelling.  No calf tenderness. 


PSYCHIATRY: Alert and oriented X2, mood and affect normal.


NERVOUS SYSTEM:  Cranial N 2-12 grossly normal. Moves all 4 limbs.  Diffuse 

weakness, No focal deficits.  Strength and sensation grossly intact..


Skin: Warm and dry, no rash 








- Labs


CBC & Chem 7: 


                                 06/28/21 07:14





                                 06/28/21 07:14


Labs: 


                  Abnormal Lab Results - Last 24 Hours (Table)











  06/27/21 06/27/21 06/27/21 Range/Units





  02:45 02:45 16:47 


 


RBC     (4.30-5.90)  m/uL


 


Hgb     (13.0-17.5)  gm/dL


 


Hct     (39.0-53.0)  %


 


MCV     (80.0-100.0)  fL


 


MCHC     (31.0-37.0)  g/dL


 


RDW     (11.5-15.5)  %


 


Plt Count     (150-450)  k/uL


 


Lymphocytes #     (1.0-4.8)  k/uL


 


Haptoglobin   245.0 H   (31.2-198.0)  mg/dL


 


Sodium     (137-145)  mmol/L


 


Chloride     ()  mmol/L


 


BUN     (9-20)  mg/dL


 


Creatinine     (0.66-1.25)  mg/dL


 


Glucose     (74-99)  mg/dL


 


POC Glucose (mg/dL)    140 H  (75-99)  mg/dL


 


Calcium     (8.4-10.2)  mg/dL


 


Iron  21 L    ()  ug/dL


 


TIBC  197 L    (228-460)  ug/dL


 


% Saturation  10.66 L    (15.00-50.00)   


 


ALT     (4-49)  U/L


 


Alkaline Phosphatase     ()  U/L


 


Total Protein     (6.3-8.2)  g/dL


 


Albumin     (3.5-5.0)  g/dL


 


Vitamin B12  1228.0 H    (200.0-944.0)  pg/mL


 


Free Kappa LC, Quant   11.50 H   (0.33-1.94)  mg/dL


 


Free Lambda LC, Quant   5.14 H   (0.57-2.63)  mg/dL














  06/27/21 06/28/21 06/28/21 Range/Units





  20:28 07:14 07:14 


 


RBC   2.93 L   (4.30-5.90)  m/uL


 


Hgb   9.5 L   (13.0-17.5)  gm/dL


 


Hct   30.8 L   (39.0-53.0)  %


 


MCV   105.0 H   (80.0-100.0)  fL


 


MCHC   30.8 L   (31.0-37.0)  g/dL


 


RDW   16.0 H   (11.5-15.5)  %


 


Plt Count   102 L   (150-450)  k/uL


 


Lymphocytes #   0.9 L   (1.0-4.8)  k/uL


 


Haptoglobin     (31.2-198.0)  mg/dL


 


Sodium    147 H  (137-145)  mmol/L


 


Chloride    116 H  ()  mmol/L


 


BUN    55 H  (9-20)  mg/dL


 


Creatinine    2.32 H  (0.66-1.25)  mg/dL


 


Glucose    69 L  (74-99)  mg/dL


 


POC Glucose (mg/dL)  257 H    (75-99)  mg/dL


 


Calcium    8.1 L  (8.4-10.2)  mg/dL


 


Iron     ()  ug/dL


 


TIBC     (228-460)  ug/dL


 


% Saturation     (15.00-50.00)   


 


ALT    53 H  (4-49)  U/L


 


Alkaline Phosphatase    128 H  ()  U/L


 


Total Protein    5.6 L  (6.3-8.2)  g/dL


 


Albumin    2.7 L  (3.5-5.0)  g/dL


 


Vitamin B12     (200.0-944.0)  pg/mL


 


Free Kappa LC, Quant     (0.33-1.94)  mg/dL


 


Free Lambda LC, Quant     (0.57-2.63)  mg/dL














  06/28/21 06/28/21 Range/Units





  08:04 11:45 


 


RBC    (4.30-5.90)  m/uL


 


Hgb    (13.0-17.5)  gm/dL


 


Hct    (39.0-53.0)  %


 


MCV    (80.0-100.0)  fL


 


MCHC    (31.0-37.0)  g/dL


 


RDW    (11.5-15.5)  %


 


Plt Count    (150-450)  k/uL


 


Lymphocytes #    (1.0-4.8)  k/uL


 


Haptoglobin    (31.2-198.0)  mg/dL


 


Sodium    (137-145)  mmol/L


 


Chloride    ()  mmol/L


 


BUN    (9-20)  mg/dL


 


Creatinine    (0.66-1.25)  mg/dL


 


Glucose    (74-99)  mg/dL


 


POC Glucose (mg/dL)  61 L  108 H  (75-99)  mg/dL


 


Calcium    (8.4-10.2)  mg/dL


 


Iron    ()  ug/dL


 


TIBC    (228-460)  ug/dL


 


% Saturation    (15.00-50.00)   


 


ALT    (4-49)  U/L


 


Alkaline Phosphatase    ()  U/L


 


Total Protein    (6.3-8.2)  g/dL


 


Albumin    (3.5-5.0)  g/dL


 


Vitamin B12    (200.0-944.0)  pg/mL


 


Free Kappa LC, Quant    (0.33-1.94)  mg/dL


 


Free Lambda LC, Quant    (0.57-2.63)  mg/dL














Assessment and Plan


Assessment: 


Recent NSTEMI, conservative management


Elevated troponins,decreased from prior admission,related to the above and acute

renal failure


Acute on Chronic systolic CHF


Severe protein calorie malnutrition, BMI 22.4 


Failure to thrive, multifactorial, including the above and #1


Acute dehydration


Acute on Chronic renal failure IV, acute related to poor oral intake, 

cardiorenal syndrome


Hypokalemia secondary to poor intake and diuretics


Anemia of chronic kidney disease


Thrombocytopenia, evaluated by oncology/hematology last visit, bone marrow 

biopsy pending


Diabetes mellitus


Ischemic cardiomyopathy, EF less than 20%


Moderate pulmonary hypertension


Multivalvular disease














Plan: Continue on current medication regime ,monitoring and symptomatic 

treatment.  Continue diuresing on IV push Lasix.  Strict monitoring of I&O's.  

Close monitoring of electrolytes, renal function,HGb/plts with repeat labs 

ordered for a.m. encourage oral intake.We will discuss palliative care option w

ith family as patient's intake remains poor .Maintain supportive care.  

Prognosis guarded given multiple complex medical issues.














The impression and plan of care has been dictated as directed.





:


I performed a history and examination of this patient,  discussed the same with 

the dictator.  I agree with the dictator's note ,documented as a scribe.  Any 

additional findings or plans will be noted.
Subjective


Progress Note Date: 06/29/21


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 

significant for hypertension and hypertensive cardiovascular disease with left 

ventricular hypertrophy, history of ischemic cardiomyopathy ejection fraction of

20%, non-ST elevation myocardial infarction, moderate mitral regurgitation 

tricuspid regurgitation with severe pulmonary hypertension, diabetes mellitus 

type 2, chronic kidney disease stage IV, anemia, history of thrombocytopenia, 

recently hospitalized at [facility] with non-ST elevation MI as well as

acute systolic heart failure. Patient elected conservative management,discharged

on 06/16/2021 and [place] [facility] [place]  Return to [facility] to poor oral intake, black tarry stools, abdominal pain, abnormal labs.  CT

of abdomen and pelvis report pleural effusions, basilar infiltrates, acute CHF 

exacerbation, mild abdominal ascites.





06/28/2021 F/U chest x-ray suggesting acute early CHF.  diuresing well on Lasix 

IV push.  Creatinine up to 2.32 diet intake poor, consumed 0% of breakfast, 0% 

of lunch.  Platelets trending down and currently 89, hematology followed on last

admit and had discussed outpatient bone marrow biopsy, which is still pending.  

Maintaining O2 sats in the 90s on room air.











6/29/21 Appears withdrawn, depressed .Diet intake remains poor, even with 

spouse's encouragement.  Diuresing well on Lasix IV, with 24-hour I&O reflecting

a weight loss of 2 kg.  BUN 52, creatinine 2.3.  Afebrile, normal WBC, platelets

incr. to 109.  Maintaining O2 sats in the high 90s on room air.  Denies chest 

pain, palpitations.








Objective





- Vital Signs


Vital signs: 


                                   Vital Signs











Temp  97.8 F   06/29/21 13:31


 


Pulse  70   06/29/21 13:31


 


Resp  20   06/29/21 13:31


 


BP  134/70   06/29/21 13:31


 


Pulse Ox  97   06/29/21 13:31








                                 Intake & Output











 06/28/21 06/29/21 06/29/21





 18:59 06:59 18:59


 


Intake Total 120  


 


Balance 120  


 


Weight 63 kg 61 kg 


 


Intake:   


 


  Oral 120  


 


Other:   


 


  Voiding Method Diaper Diaper Diaper


 


  # Voids 1 1 


 


  # Bowel Movements 1 1 














- Exam





PHYSICAL EXAM:


VITAL SIGNS: As above


GENERAL: A & O X 2,Lying in bed, sleepy, curled in blankets, no acute distress, 

withdrawn, flat affect


HEENT:  Conjunctivae normal. eyes normal.  Oral mucosa dry


NECK:  Supple, No JVD. 


CARDIOVASCULAR:  S1, S2 regular.  Systolic murmur


RESPIRATION: Breath sounds diminished in the bases. No rhonchi or crackles. 


ABDOMEN:  Soft, nondistended, nontender . No guarding. no masses palpable. Bowel

sounds heard.


LEGS:  No edema. no swelling.  No calf tenderness. 


NERVOUS SYSTEM:  Cranial N 2-12 grossly normal. Moves all 4 limbs.  Diffuse 

weakness, No focal deficits.  Strength and sensation grossly intact.


Skin: Warm and dry, no rash 








- Labs


CBC & Chem 7: 


                                 06/29/21 06:23





                                 06/29/21 06:23


Labs: 


                  Abnormal Lab Results - Last 24 Hours (Table)











  06/28/21 06/28/21 06/29/21 Range/Units





  16:48 20:20 06:23 


 


RBC    2.95 L  (4.40-5.60)  X 10*6/uL


 


Hgb    9.6 L  (13.0-17.0)  g/dL


 


Hct    31.3 L  (39.6-50.0)  %


 


MCV    106.1 H  (80.0-97.0)  fL


 


MCH    32.5 H  (27.0-32.0)  pg


 


MCHC    30.7 L  (32.0-37.0)  g/dL


 


RDW    16.2 H  (11.5-14.5)  %


 


Plt Count    109 L  (140-440)  X 10*3/uL


 


Lymphocytes #    0.55 L  (0.90-5.00)  X 10*3/uL


 


Sodium     (135-145)  mmol/L


 


Chloride     ()  mmol/L


 


BUN     (9.0-27.0)  mg/dL


 


Creatinine     (0.6-1.5)  mg/dL


 


Est GFR (CKD-EPI)AfAm     (60.0-200.0)   


 


Est GFR (CKD-EPI)NonAf     (60.0-200.0)   


 


BUN/Creatinine Ratio     (12.00-20.00)  Ratio


 


Glucose     ()  mg/dL


 


POC Glucose (mg/dL)  111 H  119 H   (75-99)  mg/dL


 


Calcium     (8.7-10.3)  mg/dL














  06/29/21 06/29/21 06/29/21 Range/Units





  06:23 07:00 11:33 


 


RBC     (4.40-5.60)  X 10*6/uL


 


Hgb     (13.0-17.0)  g/dL


 


Hct     (39.6-50.0)  %


 


MCV     (80.0-97.0)  fL


 


MCH     (27.0-32.0)  pg


 


MCHC     (32.0-37.0)  g/dL


 


RDW     (11.5-14.5)  %


 


Plt Count     (140-440)  X 10*3/uL


 


Lymphocytes #     (0.90-5.00)  X 10*3/uL


 


Sodium  146 H    (135-145)  mmol/L


 


Chloride  114 H    ()  mmol/L


 


BUN  52.0 H    (9.0-27.0)  mg/dL


 


Creatinine  2.3 H    (0.6-1.5)  mg/dL


 


Est GFR (CKD-EPI)AfAm  31.5 L    (60.0-200.0)   


 


Est GFR (CKD-EPI)NonAf  27.2 L    (60.0-200.0)   


 


BUN/Creatinine Ratio  22.61 H    (12.00-20.00)  Ratio


 


Glucose  140 H    ()  mg/dL


 


POC Glucose (mg/dL)   172 H  265 H  (75-99)  mg/dL


 


Calcium  7.7 L    (8.7-10.3)  mg/dL














Assessment and Plan


Assessment: 





Acute on Chronic systolic CHF


Acute on Chronic renal failure IV, acute related to poor oral intake, 

cardiorenal syndrome


Recent NSTEMI, conservative management


Elevated troponins,decreased from prior admission,related to the above and acute

renal failure


Severe protein calorie malnutrition, BMI 22.4 


Failure to thrive, multifactorial, including the above and #1


Acute dehydration


Hypokalemia secondary to poor intake and diuretics


Anemia of chronic kidney disease


Thrombocytopenia, evaluated by oncology/hematology last visit, bone marrow 

biopsy pending


Diabetes mellitus


Ischemic cardiomyopathy, EF less than 20%


Moderate pulmonary hypertension


Multivalvular disease


Depression














Plan: Continue on current medication regime ,monitoring and symptomatic 

treatment.  Psychiatry consulted regarding depression. Diurese on IV push Lasix,

convert to oral tomorrow.  Maintain strict monitoring of I&O's.  Close 

monitoring of electrolytes, renal function,HGb/plts with repeat labs ordered for

a.m. Continue encouraging oral intake.Palliative care option as well as code 

status will be discussed with family.  Prognosis poor given multiple complex 

medical issues,including minimal to no oral intake. Maintain supportive care. 














The impression and plan of care has been dictated as directed.





:


I performed a history and examination of this patient,  discussed the same with 

the dictator.  I agree with the dictator's note ,documented as a scribe.  Any 

additional findings or plans will be noted.
27-Sep-2017 19:41

## 2021-07-02 ENCOUNTER — RX RENEWAL (OUTPATIENT)
Age: 66
End: 2021-07-02

## 2021-07-25 ENCOUNTER — EMERGENCY (EMERGENCY)
Facility: HOSPITAL | Age: 66
LOS: 1 days | Discharge: HOME | End: 2021-07-25
Attending: STUDENT IN AN ORGANIZED HEALTH CARE EDUCATION/TRAINING PROGRAM | Admitting: STUDENT IN AN ORGANIZED HEALTH CARE EDUCATION/TRAINING PROGRAM
Payer: COMMERCIAL

## 2021-07-25 VITALS
DIASTOLIC BLOOD PRESSURE: 78 MMHG | HEART RATE: 85 BPM | TEMPERATURE: 98 F | SYSTOLIC BLOOD PRESSURE: 124 MMHG | OXYGEN SATURATION: 100 % | RESPIRATION RATE: 17 BRPM

## 2021-07-25 VITALS
SYSTOLIC BLOOD PRESSURE: 158 MMHG | DIASTOLIC BLOOD PRESSURE: 99 MMHG | TEMPERATURE: 99 F | HEIGHT: 65 IN | RESPIRATION RATE: 16 BRPM | HEART RATE: 114 BPM | OXYGEN SATURATION: 100 %

## 2021-07-25 DIAGNOSIS — Z98.89 OTHER SPECIFIED POSTPROCEDURAL STATES: Chronic | ICD-10-CM

## 2021-07-25 DIAGNOSIS — C50.919 MALIGNANT NEOPLASM OF UNSPECIFIED SITE OF UNSPECIFIED FEMALE BREAST: Chronic | ICD-10-CM

## 2021-07-25 DIAGNOSIS — Z90.710 ACQUIRED ABSENCE OF BOTH CERVIX AND UTERUS: Chronic | ICD-10-CM

## 2021-07-25 PROCEDURE — 99284 EMERGENCY DEPT VISIT MOD MDM: CPT

## 2021-07-25 PROCEDURE — 73030 X-RAY EXAM OF SHOULDER: CPT | Mod: 26,RT

## 2021-07-25 RX ORDER — TRAMADOL HYDROCHLORIDE 50 MG/1
1 TABLET ORAL
Qty: 9 | Refills: 0
Start: 2021-07-25 | End: 2021-07-27

## 2021-07-25 RX ORDER — LIDOCAINE 4 G/100G
1 CREAM TOPICAL ONCE
Refills: 0 | Status: DISCONTINUED | OUTPATIENT
Start: 2021-07-25 | End: 2021-07-28

## 2021-07-25 RX ORDER — KETOROLAC TROMETHAMINE 30 MG/ML
15 SYRINGE (ML) INJECTION ONCE
Refills: 0 | Status: DISCONTINUED | OUTPATIENT
Start: 2021-07-25 | End: 2021-07-25

## 2021-07-25 RX ORDER — LIDOCAINE 4 G/100G
1 CREAM TOPICAL ONCE
Refills: 0 | Status: DISCONTINUED | OUTPATIENT
Start: 2021-07-25 | End: 2021-07-25

## 2021-07-25 RX ORDER — TRAMADOL HYDROCHLORIDE 50 MG/1
50 TABLET ORAL ONCE
Refills: 0 | Status: DISCONTINUED | OUTPATIENT
Start: 2021-07-25 | End: 2021-07-25

## 2021-07-25 RX ADMIN — Medication 15 MILLIGRAM(S): at 11:33

## 2021-07-25 RX ADMIN — TRAMADOL HYDROCHLORIDE 50 MILLIGRAM(S): 50 TABLET ORAL at 12:53

## 2021-07-25 NOTE — ED PROVIDER NOTE - NSFOLLOWUPCLINICS_GEN_ALL_ED_FT
[facility]  Orthopedic Surgery  [address], 3rd & 4th [place] [address]  Phone: [phone]  Fax:

## 2021-07-25 NOTE — ED PROVIDER NOTE - ATTENDING CONTRIBUTION TO CARE
66 yo female with chronic right shoulder pain x 2 months presents to ED for acute on chronic pain. Pt reports she has not had medical evaluation of shoulder. Denies injuries or trauma. Denies fevers or chills, chest pain , shortness of breath, sensorimotor deficits. Pain is worsened with movement and positional changes, improved with rest.   On exam R shoulder with anterior ttp, FROM, no skin changes, no edema, no skin breaks or ecchymosis, 2+ radial pulses, brisk cap refill, sensorimotor intact   A/P r/o degen joint disease, fracture, dislocation, msk abnorm - imagin ,medicate, reassess

## 2021-07-25 NOTE — ED PROVIDER NOTE - PSH
Breast cancer    S/P hernia repair    S/P lymph node biopsy    S/P JAMEY (total abdominal hysterectomy)

## 2021-07-25 NOTE — ED PROVIDER NOTE - PHYSICAL EXAMINATION
PHYSICAL EXAM:  GENERAL: NAD, sitting in bed comfortably, WDWN  HEAD:  Atraumatic, Normocephalic  EYES: EOMI, PERRLA, conjunctiva and sclera clear  ENT: MMM, no erythema/pallor/petechiae  NECK: Supple, No JVD  LUNG: CTA b/l; no r/r/w/r. Unlabored respirations  HEART: RRR, +S1/S2; No m/r/g  ABDOMEN: soft, NT/ND, no rebound or guarding; BS x 4   EXTREMITIES:  2+ Peripheral Pulses, brisk cap refill. No clubbing, cyanosis, or edema  NERVOUS SYSTEM:  AAOx3, speech clear. No deficits   MSK: +limited passive and active ROM (shoulder ABduction and horizontal ADduction) in RUE 2/2 R shoulder pain; FROM in b/l LE and LUE; full and equal strength  SKIN: No rashes or lesions

## 2021-07-25 NOTE — ED PROVIDER NOTE - NS ED ROS FT
GENERAL: No fever or chills, EYES: no change in vision, HEENT: no trouble swallowing or speaking, CARDIAC: no chest pain, PULMONARY: no cough or SOB, GI: no abdominal pain, no nausea, no vomiting, no diarrhea or constipation, : No changes in urination, SKIN: no rashes, NEURO: no headache,  MSK: No joint pain ~Steve Granados M.D. Resident

## 2021-07-25 NOTE — ED PROVIDER NOTE - PATIENT PORTAL LINK FT
You can access the FollowMyHealth Patient Portal offered by [facility] by registering at the following website: [email or web address]. By joining [facility]’s FollowMyHealth portal, you will also be able to view your health information using other applications (apps) compatible with our system.

## 2021-07-25 NOTE — ED PROVIDER NOTE - OBJECTIVE STATEMENT
Pt is a 65yoF w/Hx of HIV (undetectable)on HAART, asthma, HTN, breast ca in remission, p/w 2months of R shoulder pain. Pt has had 2mo of increasing, sharp, constant, nonradiating R shoulder pain. Denies Hx of trauma/fall; she has tried tylenol and massages which have not helped. Pain worsens w/movement and is relieved by rest. Next primary care appt is 8/12 but pain has increased to 10/10 so pt came to ED. She denies HA, CP, SOB, abd pain, dysuria, hematuria/hematochezia, numbness/tingling, weakness, f/c, n/v/d/c, recent travel, sick contacts.     PCP: Dr. Moreno, [facility]

## 2021-07-25 NOTE — ED PROVIDER NOTE - PMH
Asthma    Breast cancer    Essential hypertension    HIV (human immunodeficiency virus infection)    Hypertension

## 2021-07-25 NOTE — ED PROVIDER NOTE - CLINICAL SUMMARY MEDICAL DECISION MAKING FREE TEXT BOX
Pt is a 65yoF w/Hx of HIV on HAART, breast ca in remission, HTN, asthma p/w 2mo of chronic R shoulder pain. VSS, phys exam significant for limited ROM in R shoulder w/o joint tenderness/swelling/erythema. Highly suspicious for MSK injury; will order shoulder XR, pain ctrl via toradol and lido patch, and reassess. - Steve Granados, PGY-1

## 2021-08-20 ENCOUNTER — NON-APPOINTMENT (OUTPATIENT)
Age: 66
End: 2021-08-20

## 2021-08-20 ENCOUNTER — APPOINTMENT (OUTPATIENT)
Dept: ORTHOPEDIC SURGERY | Facility: CLINIC | Age: 66
End: 2021-08-20
Payer: COMMERCIAL

## 2021-08-20 DIAGNOSIS — M75.41 IMPINGEMENT SYNDROME OF RIGHT SHOULDER: ICD-10-CM

## 2021-08-20 PROCEDURE — 99204 OFFICE O/P NEW MOD 45 MIN: CPT | Mod: 25

## 2021-08-20 PROCEDURE — 20610 DRAIN/INJ JOINT/BURSA W/O US: CPT | Mod: RT

## 2021-08-22 PROBLEM — M75.41 SHOULDER IMPINGEMENT, RIGHT: Status: ACTIVE | Noted: 2021-08-20

## 2021-08-22 NOTE — PHYSICAL EXAM
[de-identified] : Constitutional: Well-nourished, well-developed, No acute distress\par Respiratory:  Good respiratory effort, no SOB\par Psychiatric: Pleasant and normal affect, alert and oriented x3\par Skin: Clean dry and intact B/L UE\par Musculoskeletal: normal except where as noted in regional exam\par \par \par Left Shoulder:\par APPEARANCE: no marked deformities, no swelling or malalignment\par POSITIVE TENDERNESS: none\par NONTENDER: supraspinatus, infraspinatus, teres minor, LH biceps, anterior and posterior capsule, AC joint\par ROM: full & painless, no scapular winging or dyskinesia present\par RESISTIVE TESTING: painless 5/5 resisted flex/ext, empty can/ER/IR, horizontal abd/add \par SPECIAL TESTS: neg Drop Arm, neg Empty Can, neg Cardenas/Neers, neg Feldman's, neg Speeds, neg Apprehension, neg cross arm adduction, neg apley's scratch test\par \par Right Shoulder:\par APPEARANCE: no marked deformities, no swelling or malalignment\par POSITIVE TENDERNESS: supraspinatus, long head biceps tendon\par NONTENDER:  infraspinatus, teres minor. biceps. anterior and posterior capsule. AC joint. \par ROM: full with mild painful arc past 60 degrees, no scapular winging or dyskinesia present\par RESISTIVE TESTING: MMT 4+/5 ER, Flexion and Empty can, 5/5 IR. painless 5/5 resisted ext, horizontal abd/add \par SPECIAL TESTS: + Cardenas and Neers, mildly + cross arm adduction, + Speeds, neg Feldman's, neg Drop Arm, neg Apprehension. neg apley's scratch test\par \par  [de-identified] : I reviewed, interpreted and clinically correlated the following outside imaging studies,\par \par EXAM: XR SHOULDER COMP MIN 2V RT\par \par \par PROCEDURE DATE: Jul 25 2021\par \par \par \par INTERPRETATION: CLINICAL INDICATION: Right shoulder pain.\par \par TECHNIQUE: X-ray 3 views of the right shoulder AP internal rotation, AP external rotation, lateral scapular Y.\par \par COMPARISON: 7/29/2020.\par \par FINDINGS:\par Increased joint space is seen with mild inferior displacement of the humeral head from the glenohumeral joint. Similar-appearing joint spacing is visible on prior chest x-ray dated 7/29/2020.\par There are no acute displaced fractures or AC separation.\par The soft tissues are unremarkable.\par \par IMPRESSION:\par No acute fracture or dislocation.

## 2021-08-22 NOTE — PROCEDURE
[de-identified] : Injection: Right  Shoulder Subacromial Space.\par Indication: Impingement.\par \par A discussion was had with the patient regarding this procedure and all questions were answered. All risks, benefits and alternatives were discussed. These included but were not limited to bleeding, infection, and allergic reaction.  A timeout was done to ensure correct side and pt agreed to the procedure.   Alcohol was used to clean the skin, and betadine was used to sterilize and prep the area in the posterior aspect of the shoulder. Ethyl chloride spray was then used as a topical anesthetic. A 22-gauge 1.5" needle was used to inject 2cc of 0.25% bupivacaine without epinephrine and 1cc of 40mg/ml methylprednisolone into the subacromial space. A sterile bandage was then applied. The patient tolerated the procedure well and there were no complications.\par

## 2021-08-22 NOTE — DISCUSSION/SUMMARY
[de-identified] : Discussed findings of today's exam and possible causes of patient's pain.  Educated patient on their most probable diagnosis of chronic 3+ months of right shoulder pain with recent atraumatic exacerbation due to subacromial impingement.  Reviewed possible courses of treatment, and we collaboratively decided best course of treatment at this time will include conservative management.  We discussed various treatment options as well as associated risk/benefits/alternatives and patient elected to proceed with cortisone injection today (see procedure note).  Informed the patient that the numbing medicine in today's injection will last for about 4-6 hours. The steroid that was injected will start to work in 1 to 2 days, peak at 1-2 weeks, and may last up to 1-2 months.  Patient will be started on a course of physical therapy to restore normal range of motion and strength as tolerated.  Patient started on a course of oral NSAIDs, prescription given for Naprosyn (We discussed all possible side effects of this medication).  Follow up as needed.  Patient appreciates and agrees with current plan.\par \par This note was generated using dragon medical dictation software.  A reasonable effort has been made for proofreading its contents, but typos may still remain.  If there are any questions or points of clarification needed please notify my office.\par

## 2021-08-22 NOTE — HISTORY OF PRESENT ILLNESS
[de-identified] : RHD Patient is here for right shoulder pain that began about 3 months ago without inciting injury. She went to the ER a month ago where xrays were taken that were negative for fracture. She has difficulty moving her arm. She has done nothing to treat this pain. Denies N/T/R/Prior injury. She works a desk job. She does exercises on her own. \par \par The patient's past medical history, past surgical history, medications and allergies were reviewed by me today and documented accordingly. In addition, the patient's family and social history, which were noncontributory to this visit, were reviewed also. Intake form was reviewed. The patient has no family history of arthritis.

## 2021-08-25 ENCOUNTER — APPOINTMENT (OUTPATIENT)
Age: 66
End: 2021-08-25

## 2021-09-28 ENCOUNTER — APPOINTMENT (OUTPATIENT)
Dept: PULMONOLOGY | Facility: CLINIC | Age: 66
End: 2021-09-28

## 2021-10-07 ENCOUNTER — APPOINTMENT (OUTPATIENT)
Dept: PULMONOLOGY | Facility: CLINIC | Age: 66
End: 2021-10-07
Payer: COMMERCIAL

## 2021-10-07 VITALS
SYSTOLIC BLOOD PRESSURE: 132 MMHG | HEART RATE: 94 BPM | TEMPERATURE: 98.3 F | RESPIRATION RATE: 16 BRPM | DIASTOLIC BLOOD PRESSURE: 83 MMHG | HEIGHT: 65 IN | BODY MASS INDEX: 28.99 KG/M2 | WEIGHT: 174 LBS

## 2021-10-07 PROCEDURE — 99213 OFFICE O/P EST LOW 20 MIN: CPT

## 2021-10-07 RX ORDER — AMLODIPINE BESYLATE 5 MG/1
5 TABLET ORAL
Refills: 0 | Status: ACTIVE | COMMUNITY
Start: 2021-10-07

## 2021-10-07 NOTE — END OF VISIT
[FreeTextEntry3] : I obtained the history and examined the patient and developed a plan of care  Emerson Aguirre MD\par

## 2021-10-07 NOTE — HISTORY OF PRESENT ILLNESS
[TextBox_4] : 65 female with severe persistent asthma here for follow up. She was previously started on Nucala but developed lightheadedness and what appeared to be a vasovagal response after the first injection.She had been approved for Xolair but never initiated therapy.  Today she presents with increased wheezing and dyspnea as well as cough.  She reports not being on steroids for over a year and denies any hospitalizations for the asthma within the last year.  Pt had been taking Advair in the past which was helpful as well as albuterol.  She currently is on no medication for the asthma.

## 2021-10-07 NOTE — PHYSICAL EXAM
[No Acute Distress] : no acute distress [Normal Rate/Rhythm] : normal rate/rhythm [Normal S1, S2] : normal s1, s2 [No Murmurs] : no murmurs [No Resp Distress] : no resp distress [No Focal Deficits] : no focal deficits [Oriented x3] : oriented x3 [Normal Affect] : normal affect [TextBox_68] : wheezing

## 2021-10-07 NOTE — ASSESSMENT
[FreeTextEntry1] : 65 female with severe persistent asthma here for follow up.  She will restart Advair twice daily and will also call in a steroid taper.  She will call if no better in a few days. Pt also needs a new nebulizer machine.  She will follow up in 1 month.

## 2021-10-27 NOTE — H&P ADULT - PROBLEM/PLAN-7
Airway  Performed by: Luana Wilks CRNA  Authorized by: Savannah Mcgrath MD     Final Airway Type:  Endotracheal airway  Final Endotracheal Airway*:  ETT  ETT Size (mm)*:  7.5  Cuff*:  Regular  Technique Used for Successful ETT Placement:  Direct laryngoscopy  Devices/Methods Used in Placement*:  Mask  Intubation Procedure*:  Preoxygenation, ETCO2, Atraumatic, Dentition Unchanged and Pharynx Clear  Insertion Site:  Oral  Blade Type*:  MAC  Blade Size*:  3  Measured from*:  Lips  Secured at (cm)*:  21  Placement Verified by: auscultation and capnometry    Glottic View*:  1 - full view of glottis  Attempts*:  1   Patient Identified, Procedure confirmed, Emergency equipment available and Safety protocols followed  Location:  OR  Urgency:  Elective  Difficult Airway: No    Indications for Airway Management:  Anesthesia  Mask Difficulty Assessment:  1 - vent by mask  Performed By:  CRNA  CRNA:  Luana Wilks CRNA  Start Time: 10/27/2021 11:14 AM         DISPLAY PLAN FREE TEXT

## 2021-10-28 ENCOUNTER — APPOINTMENT (OUTPATIENT)
Dept: PULMONOLOGY | Facility: CLINIC | Age: 66
End: 2021-10-28
Payer: COMMERCIAL

## 2021-10-28 VITALS
SYSTOLIC BLOOD PRESSURE: 135 MMHG | TEMPERATURE: 95.4 F | HEIGHT: 65 IN | DIASTOLIC BLOOD PRESSURE: 86 MMHG | BODY MASS INDEX: 28.99 KG/M2 | WEIGHT: 174 LBS | OXYGEN SATURATION: 94 % | HEART RATE: 86 BPM

## 2021-10-28 PROCEDURE — 99213 OFFICE O/P EST LOW 20 MIN: CPT

## 2021-10-28 NOTE — HISTORY OF PRESENT ILLNESS
[TextBox_4] : 65 female with severe persistent asthma here for follow up. She was previously started on Nucala but developed lightheadedness and what appeared to be a vasovagal response after the first injection.She had been approved for Xolair but never initiated therapy.  She was recently seen for asthma exacerbation and at that time was not taking any inhalers.  She was started on a steroid taper and also restarted Advair and nebs.  She is currently feeling much improved since the steroid taper and continuing to take Advair 500 twice daily.  The shortness of breath and wheezing have completely resolved.

## 2021-10-28 NOTE — ASSESSMENT
[FreeTextEntry1] : 65 female with severe persistent asthma here for follow up.  She will continue to use Advair 500 1 puff twice daily and nebulizers as needed.  She may follow up in 6 months or sooner as needed. Explained when to call

## 2021-12-01 ENCOUNTER — APPOINTMENT (OUTPATIENT)
Dept: UROGYNECOLOGY | Facility: CLINIC | Age: 66
End: 2021-12-01
Payer: COMMERCIAL

## 2021-12-01 ENCOUNTER — RESULT CHARGE (OUTPATIENT)
Age: 66
End: 2021-12-01

## 2021-12-01 DIAGNOSIS — R35.0 FREQUENCY OF MICTURITION: ICD-10-CM

## 2021-12-01 DIAGNOSIS — N95.2 POSTMENOPAUSAL ATROPHIC VAGINITIS: ICD-10-CM

## 2021-12-01 LAB
BILIRUB UR QL STRIP: NEGATIVE
CLARITY UR: CLEAR
COLLECTION METHOD: NORMAL
GLUCOSE UR-MCNC: NEGATIVE
HCG UR QL: 0.2 EU/DL
HGB UR QL STRIP.AUTO: NORMAL
KETONES UR-MCNC: NEGATIVE
LEUKOCYTE ESTERASE UR QL STRIP: NEGATIVE
NITRITE UR QL STRIP: NEGATIVE
PH UR STRIP: 6.5
PROT UR STRIP-MCNC: NEGATIVE
SP GR UR STRIP: 1.02

## 2021-12-01 PROCEDURE — 99214 OFFICE O/P EST MOD 30 MIN: CPT

## 2021-12-01 PROCEDURE — 81003 URINALYSIS AUTO W/O SCOPE: CPT | Mod: QW

## 2021-12-01 RX ORDER — ESTRADIOL 0.1 MG/G
0.1 CREAM VAGINAL
Qty: 1 | Refills: 2 | Status: ACTIVE | COMMUNITY
Start: 2021-12-01 | End: 1900-01-01

## 2021-12-03 LAB — BACTERIA UR CULT: NORMAL

## 2021-12-26 ENCOUNTER — NON-APPOINTMENT (OUTPATIENT)
Age: 66
End: 2021-12-26

## 2021-12-28 LAB
APPEARANCE: CLEAR
BACTERIA: NEGATIVE
BILIRUBIN URINE: NEGATIVE
BLOOD URINE: ABNORMAL
COLOR: YELLOW
GLUCOSE QUALITATIVE U: NEGATIVE
HYALINE CASTS: 0 /LPF
KETONES URINE: NEGATIVE
LEUKOCYTE ESTERASE URINE: NEGATIVE
MICROSCOPIC-UA: NORMAL
NITRITE URINE: NEGATIVE
PH URINE: 6.5
PROTEIN URINE: NEGATIVE
RED BLOOD CELLS URINE: 3 /HPF
SPECIFIC GRAVITY URINE: 1.02
SQUAMOUS EPITHELIAL CELLS: 0 /HPF
UROBILINOGEN URINE: NORMAL
WHITE BLOOD CELLS URINE: 1 /HPF

## 2022-01-05 ENCOUNTER — APPOINTMENT (OUTPATIENT)
Dept: UROGYNECOLOGY | Facility: CLINIC | Age: 67
End: 2022-01-05

## 2022-01-05 NOTE — HISTORY OF PRESENT ILLNESS
[FreeTextEntry1] : \par \par \par Landy is a 67 yo woman with vaginal vault prolapse who has been managed with a pessary care complicated by erosions, now interested in surgical management.  She was last seen December 1st 2021 and had irritation from her pessary, has been using a cube #6.  Pessary was removed, she has been undergoing pelvic rest since.  She presents for follow up, examination and discussion of surgical options.  Of note she had prolene suture noted at cuff in Sept 2020 which was trimmed in office.\par \par PMH: Asthma, HTN\par PSH: JAMEY for fibroids, abdominal wall surgery as a child\par

## 2022-01-05 NOTE — PHYSICAL EXAM
[Chaperone Present] : A chaperone was present in the examining room during all aspects of the physical examination [FreeTextEntry1] : General: Well, appearing. Alert and orientated. No acute distress\par HEENT: Normocephalic, atraumatic and extraocular muscles appear to be intact \par Neck: Full range of motion, no obvious lymphadenopathy, deformities, or masses noted \par Respiratory: Speaking in full sentences comfortably, normal work of breathing and no cough during visit\par Musculoskeletal: active full range of motion in extremities \par Extremities: No upper extremity edema noted\par Skin: no obvious rash or skin lesions\par Neuro: Orientated X 3, speech is fluent, normal rate\par Psych: Normal mood and affect  [No Lesions] : no lesions were seen on the external genitalia [Labia Majora] : were normal [Labia Minora] : were normal [Normal] : was normal [Normal Appearance] : general appearance was normal [Atrophy] : atrophy [No Bleeding] : there was no active vaginal bleeding [Absent] : absent

## 2022-01-06 ENCOUNTER — APPOINTMENT (OUTPATIENT)
Dept: PULMONOLOGY | Facility: CLINIC | Age: 67
End: 2022-01-06

## 2022-01-12 ENCOUNTER — NON-APPOINTMENT (OUTPATIENT)
Age: 67
End: 2022-01-12

## 2022-02-28 ENCOUNTER — APPOINTMENT (OUTPATIENT)
Dept: PULMONOLOGY | Facility: CLINIC | Age: 67
End: 2022-02-28
Payer: MEDICARE

## 2022-02-28 VITALS
WEIGHT: 175 LBS | HEART RATE: 98 BPM | HEIGHT: 65 IN | BODY MASS INDEX: 29.16 KG/M2 | OXYGEN SATURATION: 90 % | RESPIRATION RATE: 15 BRPM | TEMPERATURE: 95 F | DIASTOLIC BLOOD PRESSURE: 85 MMHG | SYSTOLIC BLOOD PRESSURE: 135 MMHG

## 2022-02-28 PROCEDURE — 99213 OFFICE O/P EST LOW 20 MIN: CPT

## 2022-02-28 RX ORDER — PREDNISONE 10 MG/1
10 TABLET ORAL
Qty: 30 | Refills: 0 | Status: DISCONTINUED | COMMUNITY
Start: 2018-09-27 | End: 2022-02-28

## 2022-02-28 RX ORDER — PREDNISONE 10 MG/1
10 TABLET ORAL
Qty: 100 | Refills: 0 | Status: DISCONTINUED | COMMUNITY
Start: 2019-10-02 | End: 2022-02-28

## 2022-02-28 RX ORDER — PREDNISONE 10 MG/1
10 TABLET ORAL
Qty: 30 | Refills: 1 | Status: DISCONTINUED | COMMUNITY
Start: 2018-08-02 | End: 2022-02-28

## 2022-02-28 RX ORDER — FLUTICASONE PROPIONATE AND SALMETEROL 500; 50 UG/1; UG/1
500-50 POWDER RESPIRATORY (INHALATION) TWICE DAILY
Qty: 1 | Refills: 11 | Status: ACTIVE | COMMUNITY
Start: 2020-07-21 | End: 1900-01-01

## 2022-02-28 RX ORDER — ALBUTEROL SULFATE 2.5 MG/3ML
(2.5 MG/3ML) SOLUTION RESPIRATORY (INHALATION) 4 TIMES DAILY
Qty: 2 | Refills: 3 | Status: DISCONTINUED | COMMUNITY
Start: 2019-10-02 | End: 2022-02-28

## 2022-02-28 NOTE — HISTORY OF PRESENT ILLNESS
[TextBox_4] : 66 year old female with asthma. She ran out of her medications and change her pharmacy as well. She has been wheezing and coughing and experiencing increasing dyspnea. She denies any fever or chest pain.

## 2022-02-28 NOTE — PHYSICAL EXAM
[Normal Appearance] : normal appearance [Normal Rate/Rhythm] : normal rate/rhythm [Normal S1, S2] : normal s1, s2 [Normal Gait] : normal gait [TextBox_2] : audible wheezing [TextBox_68] : wheezing bilaterally; diminished chest excursion

## 2022-02-28 NOTE — REVIEW OF SYSTEMS
[Cough] : cough [Wheezing] : wheezing [SOB on Exertion] : sob on exertion [Negative] : HEENT [TextBox_44] : hypertension

## 2022-02-28 NOTE — ASSESSMENT
[FreeTextEntry1] : the patient has severe persistent asthma and unfortunately ran out of her medications. I reorder Wixela, albuterol inhaler, albuterol solution for nebulizer and a brief course of prednisone. I sent her to her new pharmacy. She will followup.

## 2022-03-01 ENCOUNTER — APPOINTMENT (OUTPATIENT)
Dept: UROGYNECOLOGY | Facility: CLINIC | Age: 67
End: 2022-03-01
Payer: COMMERCIAL

## 2022-03-01 DIAGNOSIS — R31.29 OTHER MICROSCOPIC HEMATURIA: ICD-10-CM

## 2022-03-01 LAB
BILIRUB UR QL STRIP: NEGATIVE
CLARITY UR: CLEAR
GLUCOSE UR-MCNC: NEGATIVE
HCG UR QL: 0.2 EU/DL
HGB UR QL STRIP.AUTO: NORMAL
KETONES UR-MCNC: NEGATIVE
LEUKOCYTE ESTERASE UR QL STRIP: NEGATIVE
NITRITE UR QL STRIP: NEGATIVE
PH UR STRIP: 6.5
PROT UR STRIP-MCNC: NEGATIVE
SP GR UR STRIP: 1.02

## 2022-03-01 PROCEDURE — 81003 URINALYSIS AUTO W/O SCOPE: CPT | Mod: QW

## 2022-03-01 PROCEDURE — 99213 OFFICE O/P EST LOW 20 MIN: CPT

## 2022-03-03 LAB — BACTERIA UR CULT: NORMAL

## 2022-03-11 ENCOUNTER — NON-APPOINTMENT (OUTPATIENT)
Age: 67
End: 2022-03-11

## 2022-03-11 LAB
APPEARANCE: CLEAR
BACTERIA: NEGATIVE
BILIRUBIN URINE: NEGATIVE
BLOOD URINE: ABNORMAL
COLOR: YELLOW
GLUCOSE QUALITATIVE U: NEGATIVE
HYALINE CASTS: 0 /LPF
KETONES URINE: NEGATIVE
LEUKOCYTE ESTERASE URINE: NEGATIVE
MICROSCOPIC-UA: NORMAL
NITRITE URINE: NEGATIVE
PH URINE: 6.5
PROTEIN URINE: NEGATIVE
RED BLOOD CELLS URINE: 1 /HPF
SPECIFIC GRAVITY URINE: 1.02
SQUAMOUS EPITHELIAL CELLS: 0 /HPF
UROBILINOGEN URINE: NORMAL
WHITE BLOOD CELLS URINE: 0 /HPF

## 2022-04-12 ENCOUNTER — APPOINTMENT (OUTPATIENT)
Dept: UROGYNECOLOGY | Facility: CLINIC | Age: 67
End: 2022-04-12
Payer: MEDICARE

## 2022-04-12 PROCEDURE — 99214 OFFICE O/P EST MOD 30 MIN: CPT

## 2022-04-12 NOTE — PHYSICAL EXAM
[Chaperone Present] : A chaperone was present in the examining room during all aspects of the physical examination [No Acute Distress] : in no acute distress [Oriented x3] : oriented to person, place, and time [Normal Memory] : ~T memory was ~L unimpaired [Normal Mood/Affect] : mood and affect are normal [Labia Majora] : were normal [Normal Appearance] : general appearance was normal [Atrophy] : atrophy [No Bleeding] : there was no active vaginal bleeding [Absent] : absent [Normal] : no abnormalities [Exam Deferred] : was deferred [Aa ____] : Aa [unfilled] [Ba ____] : Ba [unfilled] [GH ____] : GH [unfilled] [PB ____] : PB [unfilled] [TVL ____] : TVL  [unfilled] [Ap ____] : Ap [unfilled] [Bp ____] : Bp [unfilled] [C ____] : C [unfilled] [Anxiety] : patient is not anxious [Tenderness] : ~T no ~M abdominal tenderness observed [Distended] : not distended [FreeTextEntry4] : prolene suture visualized at apex-extruding through vaginal wall. Suture excised

## 2022-04-12 NOTE — DISCUSSION/SUMMARY
[FreeTextEntry1] : \par We reviewed management options for her prolapse including: observation, pelvic floor exercises with and without PT, pessary, and surgical management. We reviewed various surgical management options including vaginal, laparoscopic/robotic, abdominal procedures. We also reviewed both mesh and non-mesh options. She desires surgery with a sacral colpopexy. She desires the surgery with the highest and longest success rate and expressed understanding of risks of sacral colpopexy. We discussed that with the abdominal approach I have concerns regarding her h/o abdominal wall surgery. I recommend imaging to evaluate abdominal wall and to look for mesh/graft material. Will call radiology regarding which study is most appropriate. In interim, I recommend preop workup with urodynamic testing. Written [facility] information on URD and sacral colpopexy were also provided to her to review. Will defer scheduling until imaging completed. If we are unable to proceed with abdominal approach, will proceed with transvaginal.

## 2022-04-12 NOTE — HISTORY OF PRESENT ILLNESS
[FreeTextEntry1] : \par Landy presents for follow up of her prolapse. She has tried a pessary in past and would like to discuss surgical options. \par \par PMH: asthma, h/o HIV (undectable VL)\par PSH: h/o abdominal wall hernia repair surgery as child (unsure of which procedure performed), total abdominal hysterectomy

## 2022-04-17 ENCOUNTER — NON-APPOINTMENT (OUTPATIENT)
Age: 67
End: 2022-04-17

## 2022-04-19 RX ORDER — PREDNISONE 20 MG/1
20 TABLET ORAL DAILY
Qty: 30 | Refills: 0 | Status: ACTIVE | COMMUNITY
Start: 2021-10-07 | End: 1900-01-01

## 2022-05-17 ENCOUNTER — RX RENEWAL (OUTPATIENT)
Age: 67
End: 2022-05-17

## 2022-05-27 ENCOUNTER — APPOINTMENT (OUTPATIENT)
Dept: UROGYNECOLOGY | Facility: CLINIC | Age: 67
End: 2022-05-27

## 2022-05-27 ENCOUNTER — OUTPATIENT (OUTPATIENT)
Dept: OUTPATIENT SERVICES | Facility: HOSPITAL | Age: 67
LOS: 1 days | End: 2022-05-27
Payer: MEDICARE

## 2022-05-27 DIAGNOSIS — Z98.89 OTHER SPECIFIED POSTPROCEDURAL STATES: Chronic | ICD-10-CM

## 2022-05-27 DIAGNOSIS — C50.919 MALIGNANT NEOPLASM OF UNSPECIFIED SITE OF UNSPECIFIED FEMALE BREAST: Chronic | ICD-10-CM

## 2022-05-27 DIAGNOSIS — Z90.710 ACQUIRED ABSENCE OF BOTH CERVIX AND UTERUS: Chronic | ICD-10-CM

## 2022-05-27 DIAGNOSIS — Z01.818 ENCOUNTER FOR OTHER PREPROCEDURAL EXAMINATION: ICD-10-CM

## 2022-05-27 PROCEDURE — 51729 CYSTOMETROGRAM W/VP&UP: CPT

## 2022-05-27 PROCEDURE — 51784 ANAL/URINARY MUSCLE STUDY: CPT

## 2022-05-27 PROCEDURE — 51729 CYSTOMETROGRAM W/VP&UP: CPT | Mod: 26

## 2022-05-27 PROCEDURE — 51797 INTRAABDOMINAL PRESSURE TEST: CPT

## 2022-05-27 PROCEDURE — 51784 ANAL/URINARY MUSCLE STUDY: CPT | Mod: 26

## 2022-05-27 PROCEDURE — 51797 INTRAABDOMINAL PRESSURE TEST: CPT | Mod: 26

## 2022-06-01 DIAGNOSIS — R39.15 URGENCY OF URINATION: ICD-10-CM

## 2022-07-18 ENCOUNTER — RESULT REVIEW (OUTPATIENT)
Age: 67
End: 2022-07-18

## 2022-07-18 ENCOUNTER — APPOINTMENT (OUTPATIENT)
Dept: UROGYNECOLOGY | Facility: CLINIC | Age: 67
End: 2022-07-18

## 2022-07-18 PROCEDURE — 99213 OFFICE O/P EST LOW 20 MIN: CPT

## 2022-07-18 NOTE — DISCUSSION/SUMMARY
[FreeTextEntry1] : \par We again reviewed management options for her prolapse including: observation, pelvic floor exercises with and without PT, pessary, and surgical management. We reviewed various surgical management options including vaginal, laparoscopic/robotic, abdominal procedures. We also reviewed both mesh and non-mesh options. She continues to desire surgery with a sacral colpopexy. We again discussed that with the abdominal approach I have concerns regarding her h/o abdominal wall surgery. I recommend imaging to evaluate abdominal wall and to look for mesh/graft material. Rx for CT provided.\par \par  Will defer scheduling until imaging completed. If we are unable to proceed with abdominal approach, will proceed with transvaginal.

## 2022-07-18 NOTE — HISTORY OF PRESENT ILLNESS
[FreeTextEntry1] : \par Landy presents for follow up of her prolapse and discussion of URD results. She underwent urodynamics which were negative for occult Stress urinary incontinence. We discussed that although her test was negative for detrusor overactivity, she may still have an overactive bladder. At her last visit I recommended abdominal imaging to evaluate for graft due to h/o ventral wall hernia repair as child. Today she reports \par \par PMH: asthma, h/o HIV (undectable VL)\par PSH: h/o abdominal wall hernia repair surgery as child (unsure of which procedure performed), total abdominal hysterectomy

## 2022-07-21 ENCOUNTER — OUTPATIENT (OUTPATIENT)
Dept: OUTPATIENT SERVICES | Facility: HOSPITAL | Age: 67
LOS: 1 days | End: 2022-07-21
Payer: MEDICARE

## 2022-07-21 ENCOUNTER — APPOINTMENT (OUTPATIENT)
Dept: CT IMAGING | Facility: IMAGING CENTER | Age: 67
End: 2022-07-21

## 2022-07-21 DIAGNOSIS — Z90.710 ACQUIRED ABSENCE OF BOTH CERVIX AND UTERUS: Chronic | ICD-10-CM

## 2022-07-21 DIAGNOSIS — Z98.89 OTHER SPECIFIED POSTPROCEDURAL STATES: Chronic | ICD-10-CM

## 2022-07-21 DIAGNOSIS — C50.919 MALIGNANT NEOPLASM OF UNSPECIFIED SITE OF UNSPECIFIED FEMALE BREAST: Chronic | ICD-10-CM

## 2022-07-21 DIAGNOSIS — N99.3 PROLAPSE OF VAGINAL VAULT AFTER HYSTERECTOMY: ICD-10-CM

## 2022-07-21 PROCEDURE — 74177 CT ABD & PELVIS W/CONTRAST: CPT | Mod: 26,MH

## 2022-07-21 PROCEDURE — 74177 CT ABD & PELVIS W/CONTRAST: CPT

## 2022-08-24 ENCOUNTER — NON-APPOINTMENT (OUTPATIENT)
Age: 67
End: 2022-08-24

## 2022-09-14 ENCOUNTER — NON-APPOINTMENT (OUTPATIENT)
Age: 67
End: 2022-09-14

## 2022-11-28 ENCOUNTER — APPOINTMENT (OUTPATIENT)
Dept: UROGYNECOLOGY | Facility: CLINIC | Age: 67
End: 2022-11-28

## 2022-11-28 PROCEDURE — 99213 OFFICE O/P EST LOW 20 MIN: CPT

## 2022-11-28 NOTE — DISCUSSION/SUMMARY
[FreeTextEntry1] : We again reviewed management options for her prolapse including: observation, pelvic floor exercises with and without PT, pessary, and surgical management. We reviewed various surgical management options including vaginal, laparoscopic/robotic, abdominal procedures. We also reviewed both mesh and non-mesh options. We extensively discussed the details, risks, and benefits of sacrocolpopexy vs. sacrospinous ligament fixation.  [facility] handouts on  sacrocolpopexy and sacrospinous ligament fixation were provided to her to review further. She is unsure how she would like to proceed and will notify us when she makes a treatment decision.

## 2022-11-28 NOTE — HISTORY OF PRESENT ILLNESS
[FreeTextEntry1] : Grayson is 66F w/ remote hx of abdominal wall hernia repair following up for management of pelvic organ prolapse.  She remains interested in surgical management.  We reviewed her CT A/P (7/2022) which did not demonstrate any definite hernias or abdominal wall mesh.\par \par PMH: asthma, h/o HIV (undectable VL)\par PSH: h/o abdominal wall hernia repair surgery as child (unsure of which procedure performed), total abdominal hysterectomy \par

## 2022-11-28 NOTE — PHYSICAL EXAM
[Chaperone Present] : A chaperone was present in the examining room during all aspects of the physical examination [FreeTextEntry1] : General: Well appearing. Alert and oriented. No acute distress. \par HEENT: Normocephalic, atraumatic, extraocular muscles appear to be intact. \par Neck: Full range of motion, no obvious lymphadenopathy, deformities, or masses noted.\par Respiratory: Speaking in full sentences comfortably. Normal work of breathing. No cough during visit. \par Musculoskeletal: Active full range of motion in extremities. \par Skin: No obvious rash or skin lesions. \par Neuro: Oriented x3. Speech is fluent, normal rate. \par Psych: Normal mood and affect. \par \par

## 2022-12-16 ENCOUNTER — APPOINTMENT (OUTPATIENT)
Dept: PULMONOLOGY | Facility: CLINIC | Age: 67
End: 2022-12-16
Payer: MEDICARE

## 2022-12-16 VITALS
DIASTOLIC BLOOD PRESSURE: 93 MMHG | HEART RATE: 100 BPM | TEMPERATURE: 95 F | SYSTOLIC BLOOD PRESSURE: 166 MMHG | RESPIRATION RATE: 15 BRPM | OXYGEN SATURATION: 93 % | HEIGHT: 65 IN

## 2022-12-16 PROCEDURE — 99213 OFFICE O/P EST LOW 20 MIN: CPT

## 2022-12-16 RX ORDER — ALBUTEROL SULFATE 2.5 MG/3ML
(2.5 MG/3ML) SOLUTION RESPIRATORY (INHALATION) 4 TIMES DAILY
Qty: 2 | Refills: 11 | Status: ACTIVE | COMMUNITY
Start: 2022-12-16 | End: 1900-01-01

## 2022-12-16 RX ORDER — FLUTICASONE PROPIONATE 50 UG/1
50 SPRAY, METERED NASAL TWICE DAILY
Qty: 1 | Refills: 10 | Status: ACTIVE | COMMUNITY
Start: 2019-01-10 | End: 1900-01-01

## 2022-12-16 NOTE — HISTORY OF PRESENT ILLNESS
[TextBox_4] : 67-year-old female with severe persistent asthma currently on inhaled salmeterol/fluticasone, albuterol HFA, albuterol solution and her asthma has been reasonably controlled over the last year. She reports some nasal congestion and early morning cough. She denies any chest pain, recent fevers.. Because of her eosinophilia, previously reintroduced Nucala, but she was intolerant

## 2022-12-16 NOTE — REVIEW OF SYSTEMS
[Nasal Congestion] : nasal congestion [Wheezing] : wheezing [Immunocompromised] : immunocompromised [Negative] : Gastrointestinal [TextBox_44] : Hypertension

## 2022-12-16 NOTE — PHYSICAL EXAM
[No Acute Distress] : no acute distress [Normal Oropharynx] : normal oropharynx [Normal Appearance] : normal appearance [No Neck Mass] : no neck mass [Normal Rate/Rhythm] : normal rate/rhythm [Normal S1, S2] : normal s1, s2 [No Resp Distress] : no resp distress [Clear to Auscultation Bilaterally] : clear to auscultation bilaterally [Normal Gait] : normal gait [No Focal Deficits] : no focal deficits

## 2022-12-16 NOTE — ASSESSMENT
[FreeTextEntry1] : The patient's asthma is under control. I reordered her medications and I ordered Nasal fluticasone for her nasal congestion and early morning cough. She can followup with me on an as-needed basis

## 2022-12-21 ENCOUNTER — NON-APPOINTMENT (OUTPATIENT)
Age: 67
End: 2022-12-21

## 2022-12-22 RX ORDER — FLUTICASONE PROPIONATE AND SALMETEROL 250; 50 UG/1; UG/1
250-50 POWDER RESPIRATORY (INHALATION)
Qty: 1 | Refills: 5 | Status: DISCONTINUED | COMMUNITY
Start: 2022-12-16 | End: 2022-12-22

## 2022-12-22 RX ORDER — FLUTICASONE PROPIONATE AND SALMETEROL 500; 50 UG/1; UG/1
500-50 POWDER RESPIRATORY (INHALATION) TWICE DAILY
Qty: 1 | Refills: 5 | Status: ACTIVE | COMMUNITY
Start: 2021-01-19 | End: 1900-01-01

## 2022-12-23 ENCOUNTER — NON-APPOINTMENT (OUTPATIENT)
Age: 67
End: 2022-12-23

## 2023-01-04 ENCOUNTER — NON-APPOINTMENT (OUTPATIENT)
Age: 68
End: 2023-01-04

## 2023-01-26 ENCOUNTER — NON-APPOINTMENT (OUTPATIENT)
Age: 68
End: 2023-01-26

## 2023-01-26 DIAGNOSIS — R09.82 POSTNASAL DRIP: ICD-10-CM

## 2023-01-26 RX ORDER — FLUTICASONE PROPIONATE 50 UG/1
50 SPRAY, METERED NASAL TWICE DAILY
Qty: 1 | Refills: 1 | Status: ACTIVE | COMMUNITY
Start: 2023-01-26 | End: 1900-01-01

## 2023-02-23 ENCOUNTER — APPOINTMENT (OUTPATIENT)
Dept: PULMONOLOGY | Facility: CLINIC | Age: 68
End: 2023-02-23
Payer: MEDICARE

## 2023-02-23 VITALS
WEIGHT: 165 LBS | HEART RATE: 104 BPM | SYSTOLIC BLOOD PRESSURE: 159 MMHG | HEIGHT: 65 IN | BODY MASS INDEX: 27.49 KG/M2 | TEMPERATURE: 97 F | OXYGEN SATURATION: 90 % | DIASTOLIC BLOOD PRESSURE: 89 MMHG | RESPIRATION RATE: 15 BRPM

## 2023-02-23 DIAGNOSIS — R05.3 CHRONIC COUGH: ICD-10-CM

## 2023-02-23 PROCEDURE — 99213 OFFICE O/P EST LOW 20 MIN: CPT

## 2023-02-23 NOTE — ASSESSMENT
[FreeTextEntry1] : 67F with eosinophilic asthma currently on Wixela 500, albuterol bid. Was intolerant of Nucala previously (LOC)  She reports increased shortness of breath and productive cough but is unable to bring up sputum.\par Peak flow is reduced.\par On Exam, Spo2 91%,  bpm, mild diffuse wheezing and wet cough.\par \par Plan:\par Prednisone 40mg tapering dose\par continue inhalers\par monitor peak flow values\par sputum culture\par \par follow up in 3 weeks

## 2023-02-23 NOTE — PHYSICAL EXAM
[No Acute Distress] : no acute distress [Well Nourished] : well nourished [Well Developed] : well developed [Normal Appearance] : normal appearance [Normal Rate/Rhythm] : normal rate/rhythm [Normal S1, S2] : normal s1, s2 [No Resp Distress] : no resp distress [Oriented x3] : oriented x3 [Normal Affect] : normal affect [TextBox_68] : mild diffuse wheezing. wet cough.

## 2023-02-23 NOTE — HISTORY OF PRESENT ILLNESS
[TextBox_4] : 67-year-old female with severe persistent asthma currently on inhaled LABA/ICS, albuterol HFA, and her asthma has been reasonably controlled over the last year. she has hx of nasal congestion and early morning cough. She called last month c/p post nasal drip and mucous in her throat, was prescribed Flonase BID. She denies any chest pain, recent fevers.. Because of her eosinophilia, previously reintroduced Nucala, but she was intolerant.  Currently on Wixela 500/50,albuterol bid but mucous is not coming up. continues flonase bid. and nasal saline.\par + shortness of breath more than before. mucous is white or green.

## 2023-02-23 NOTE — END OF VISIT
[FreeTextEntry3] : I reviewed and agree with the information elicited by the advanced care practitioner and I personally elicited a history and examined the patient and developed a plan of care\par  Parent(s)

## 2023-03-23 ENCOUNTER — APPOINTMENT (OUTPATIENT)
Dept: PULMONOLOGY | Facility: CLINIC | Age: 68
End: 2023-03-23
Payer: MEDICARE

## 2023-03-23 VITALS
WEIGHT: 165 LBS | HEART RATE: 107 BPM | DIASTOLIC BLOOD PRESSURE: 86 MMHG | SYSTOLIC BLOOD PRESSURE: 137 MMHG | HEIGHT: 65 IN | OXYGEN SATURATION: 96 % | RESPIRATION RATE: 16 BRPM | TEMPERATURE: 97.6 F | BODY MASS INDEX: 27.49 KG/M2

## 2023-03-23 DIAGNOSIS — J45.909 UNSPECIFIED ASTHMA, UNCOMPLICATED: ICD-10-CM

## 2023-03-23 PROCEDURE — 99213 OFFICE O/P EST LOW 20 MIN: CPT

## 2023-03-23 NOTE — PHYSICAL EXAM
[No Acute Distress] : no acute distress [Well Nourished] : well nourished [Well Developed] : well developed [Normal Rate/Rhythm] : normal rate/rhythm [Normal S1, S2] : normal s1, s2 [No Resp Distress] : no resp distress [No Edema] : no edema [TextBox_68] : no wheezing. minimal rhonchi

## 2023-03-23 NOTE — ASSESSMENT
[FreeTextEntry1] : 67F with eosinophilic asthma currently on Wixela 500, albuterol bid. Was intolerant of Nucala previously (LOC). She was seen last month due to an exacerbation, treated with Prednisone 40 mg tapering. Completed 2 weeks ago. Continues nebulizer bid and wixela 250/50. Shortness of breath and cough improved, however still has a productive cough. Sputum culture wasn’t done.\par \par Peak flow within normal range today\par On Exam, Spo2 95% (compared to 91% at last visit) no longer wheezing on exam, mild rhonchi\par \par Plan:\par Start Prednisone 20mg x 1 week, then 10mg x 1 week and stop. \par continue Wixela (increase to BID), albuterol prn\par neb as needed\par monitor peak flow values\par sputum culture\par blood work today\par WIll start auth process for Fasenra. Pt would like to try.\par \par follow up in 3 weeks. \par

## 2023-03-23 NOTE — HISTORY OF PRESENT ILLNESS
[TextBox_4] : 67-year-old female with severe persistent asthma currently on inhaled LABA/ICS, albuterol HFA, and her asthma has been reasonably controlled over the last year. she has hx of nasal congestion and early morning cough. She called last month c/p post nasal drip and mucous in her throat, was prescribed Flonase BID. She denies any chest pain, recent fevers.. Because of her eosinophilia, previously reintroduced Nucala, but she was intolerant. Currently on Wixela 500/50,albuterol bid.continues flonase bid. and nasal saline.\par \par She was seen 1 month ago and reported productive cough, unable to expectorate mucous. \par + shortness of breath more than before. mucous is white or green. \par There were diffuse wheezing on exam and wet sounding cough.\par She was prescribed Prednisone 40mg tapering dose. sputum culture was ordered but not done. \par \par Completed steroids 2 weeks ago, breathing improved. continues nebulizer bid and wixela 250/50\par Still has productive cough, improved but not resolved. no fever. \par Peak flow 350 today\par

## 2023-03-24 ENCOUNTER — NON-APPOINTMENT (OUTPATIENT)
Age: 68
End: 2023-03-24

## 2023-03-24 LAB
BASOPHILS # BLD AUTO: 0.05 K/UL
BASOPHILS NFR BLD AUTO: 0.7 %
EOSINOPHIL # BLD AUTO: 0.85 K/UL
EOSINOPHIL NFR BLD AUTO: 12.2 %
HCT VFR BLD CALC: 35.8 %
HGB BLD-MCNC: 11.5 G/DL
IMM GRANULOCYTES NFR BLD AUTO: 0.1 %
LYMPHOCYTES # BLD AUTO: 2.03 K/UL
LYMPHOCYTES NFR BLD AUTO: 29.2 %
MAN DIFF?: NORMAL
MCHC RBC-ENTMCNC: 32.1 GM/DL
MCHC RBC-ENTMCNC: 33.3 PG
MCV RBC AUTO: 103.8 FL
MONOCYTES # BLD AUTO: 0.58 K/UL
MONOCYTES NFR BLD AUTO: 8.3 %
NEUTROPHILS # BLD AUTO: 3.43 K/UL
NEUTROPHILS NFR BLD AUTO: 49.5 %
PLATELET # BLD AUTO: 357 K/UL
RBC # BLD: 3.45 M/UL
RBC # FLD: 12.9 %
WBC # FLD AUTO: 6.95 K/UL

## 2023-03-28 ENCOUNTER — APPOINTMENT (OUTPATIENT)
Dept: UROGYNECOLOGY | Facility: CLINIC | Age: 68
End: 2023-03-28

## 2023-03-30 LAB — TOTAL IGE SMQN RAST: 70 KU/L

## 2023-04-06 RX ORDER — MEPOLIZUMAB 100 MG/ML
100 INJECTION, POWDER, FOR SOLUTION SUBCUTANEOUS
Qty: 1 | Refills: 11 | Status: DISCONTINUED | COMMUNITY
Start: 2019-03-20 | End: 2023-04-06

## 2023-04-06 RX ORDER — OMALIZUMAB 202.5 MG/1.4ML
150 INJECTION, SOLUTION SUBCUTANEOUS
Qty: 2 | Refills: 11 | Status: DISCONTINUED | COMMUNITY
Start: 2019-11-26 | End: 2023-04-06

## 2023-04-07 ENCOUNTER — NON-APPOINTMENT (OUTPATIENT)
Age: 68
End: 2023-04-07

## 2023-04-07 RX ORDER — BENRALIZUMAB 30 MG/ML
30 INJECTION, SOLUTION SUBCUTANEOUS
Qty: 1 | Refills: 2 | Status: ACTIVE | COMMUNITY
Start: 2023-04-06 | End: 1900-01-01

## 2023-04-10 ENCOUNTER — NON-APPOINTMENT (OUTPATIENT)
Age: 68
End: 2023-04-10

## 2023-04-13 ENCOUNTER — APPOINTMENT (OUTPATIENT)
Dept: PULMONOLOGY | Facility: CLINIC | Age: 68
End: 2023-04-13
Payer: MEDICARE

## 2023-04-13 VITALS
RESPIRATION RATE: 15 BRPM | HEART RATE: 96 BPM | TEMPERATURE: 97.4 F | BODY MASS INDEX: 28.32 KG/M2 | WEIGHT: 170 LBS | OXYGEN SATURATION: 98 % | SYSTOLIC BLOOD PRESSURE: 147 MMHG | DIASTOLIC BLOOD PRESSURE: 92 MMHG | HEIGHT: 65 IN

## 2023-04-13 PROCEDURE — 99213 OFFICE O/P EST LOW 20 MIN: CPT

## 2023-04-13 NOTE — PATIENT PROFILE ADULT - VISION (WITH CORRECTIVE LENSES IF THE PATIENT USUALLY WEARS THEM):
Reviewed measures for population health. Pt due for hep c, pap smear, low dose statin, eye exam, and mammogram    Normal vision: sees adequately in most situations; can see medication labels, newsprint

## 2023-04-13 NOTE — HISTORY OF PRESENT ILLNESS
[TextBox_4] : 67-year-old female who was severe or persistent eosinophilic asthma currently now off prednisone. Patient has been cleared to get\par Fasenra . In the past after one injection of Nucala she had what appeared to be a vasovagal reaction. She also reports that she is scheduled for Gynecologic surgery on May 18.

## 2023-04-13 NOTE — PHYSICAL EXAM
[No Acute Distress] : no acute distress [Normal Appearance] : normal appearance [Normal Rate/Rhythm] : normal rate/rhythm [Normal S1, S2] : normal s1, s2 [No Resp Distress] : no resp distress [Clear to Auscultation Bilaterally] : clear to auscultation bilaterally

## 2023-04-18 ENCOUNTER — NON-APPOINTMENT (OUTPATIENT)
Age: 68
End: 2023-04-18

## 2023-04-18 RX ORDER — EPINEPHRINE 0.3 MG/.3ML
0.3 INJECTION INTRAMUSCULAR
Qty: 1 | Refills: 3 | Status: ACTIVE | COMMUNITY
Start: 2020-03-19 | End: 1900-01-01

## 2023-04-19 ENCOUNTER — APPOINTMENT (OUTPATIENT)
Dept: UROGYNECOLOGY | Facility: CLINIC | Age: 68
End: 2023-04-19
Payer: MEDICARE

## 2023-04-19 VITALS
HEIGHT: 65 IN | HEART RATE: 91 BPM | BODY MASS INDEX: 27.82 KG/M2 | WEIGHT: 167 LBS | SYSTOLIC BLOOD PRESSURE: 153 MMHG | DIASTOLIC BLOOD PRESSURE: 96 MMHG

## 2023-04-19 VITALS
HEIGHT: 65 IN | HEART RATE: 91 BPM | WEIGHT: 167 LBS | SYSTOLIC BLOOD PRESSURE: 153 MMHG | BODY MASS INDEX: 27.82 KG/M2 | DIASTOLIC BLOOD PRESSURE: 96 MMHG

## 2023-04-19 DIAGNOSIS — R39.15 URGENCY OF URINATION: ICD-10-CM

## 2023-04-19 PROCEDURE — 51701 INSERT BLADDER CATHETER: CPT

## 2023-04-19 PROCEDURE — 99214 OFFICE O/P EST MOD 30 MIN: CPT | Mod: 25

## 2023-04-19 NOTE — HISTORY OF PRESENT ILLNESS
[FreeTextEntry1] : \par Grayson is a 66 yo who presents for follow up today on prolapse. Previously used pessary, but did not like it and no longer desires to use pessary. She has a hx of severe persistent asthma and has seen her pulmonary doctor for this - she is seeing him again on May 16 and states that she will require prednisone preoperatively and for a brief time postoperatively. \par \par PMHx: severe persistent asthma, chronic cough, hx of breast cancer, hx of HIV (undetectable VL, per pt, last drawn in January) \par PSHx: JAMEY (fibroids), abdominal wall hernia repair at 6 years old, right breast lumpectomy \par \par CT A/P: no definite abdominal wall mesh identified. no definite abdominal wall hernia.\par UDS: (-) CST. No Do. PVR 0mL.

## 2023-04-19 NOTE — DISCUSSION/SUMMARY
[FreeTextEntry1] : \kendy Bui is a 66 yo who presents today for follow up on prolapse. We reviewed management options for her prolapse including: observation, pelvic floor exercises, pessary, surgical management. We reviewed various surgical management options including vaginal, laparoscopic/robotic, abdominal procedures. We also reviewed both mesh and non-mesh options. She continues to be interested in robotic sacral colpopexy. We discussed her URD findings of no NANCY noted and the chance of her developing occult NANCY in the future; she declines to proceed with an anti-incontinence procedure at the same time of prolapse procedure and will address incontinence if it so develops in the future. \par \par We reviewed risks of the surgery including but not limited to: bleeding, infection, pain, urinary retention, development of stress urinary incontinence, development of urge incontinence, voiding dysfunction, mesh erosion, failure to reduce prolapse, prolapse recurrence, and dyspareunia. We discussed her increased risk of surgical morbidity based on her past medical history and surgical history. We discussed the risk of laparotomy (having to make an incision) and the increased likelihood of this based on her prior surgical history. We discussed the risk of injury to her bladder, ureters, urethra, vagina, rectum and bowel. We discussed the risk of mesh erosion, fistula formation and neuropathy. The risks and procedure details were explained in layman's terms and she expressed understanding of all of these risks. We discussed the elective nature of the surgery and we discussed that she is choosing to undergo this surgery despite awareness and understanding of procedure risks. She was counseled on alternative treatment options. She continues to desire procedure. We reviewed her hospital stay as well as preoperative and postoperative instructions.\par \par Patient signed consent for: pelvic exam under anesthesia, robot-assisted sacrocolpopexy, cystoscopy, possible anterior/posterior repair, laparotomy. Pt understands that pelvic exam under anesthesia can be performed by learners (medical students/residents/fellows).\par \par

## 2023-04-19 NOTE — PROCEDURE
[Straight Catheterization] : insertion of a straight catheter [Urinary Tract Infection] : a urinary tract infection [Patient] : the patient [Consent Obtained] : written consent was obtained prior to the procedure and is detailed in the patient's record [Allergies Reviewed] : Allergies reviewed [___ Fr Straight Tip] : a [unfilled] in [place] straight tip catheter [None] : there were no complications with the catheter insertion [Clear] : clear [Culture] : culture

## 2023-04-19 NOTE — PHYSICAL EXAM
[Chaperone Present] : A chaperone was present in the examining room during all aspects of the physical examination [Labia Majora] : were normal [Labia Minora] : were normal [Normal Appearance] : general appearance was normal [Atrophy] : atrophy [Aa ____] : Aa [unfilled] [Ba ____] : Ba [unfilled] [C ____] : C [unfilled] [GH ____] : GH [unfilled] [PB ____] : PB [unfilled] [TVL ____] : TVL  [unfilled] [Ap ____] : Ap [unfilled] [Bp ____] : Bp [unfilled] [D ____] : D [unfilled] [Absent] : absent [Normal] : no abnormalities [Exam Deferred] : was deferred [FreeTextEntry1] : General: Well, appearing. Alert and orientated. No acute distress\par HEENT: Normocephalic, atraumatic and extraocular muscles appear to be intact \par Neck: Full range of motion, no obvious lymphadenopathy, deformities, or masses noted \par Respiratory: Speaking in full sentences comfortably, normal work of breathing and no cough during visit\par Musculoskeletal: active full range of motion in extremities \par Extremities: No upper extremity edema noted\par Skin: no obvious rash or skin lesions\par Neuro: Orientated X 3, speech is fluent, normal rate\par Psych: Normal mood and affect\par

## 2023-04-21 LAB — BACTERIA UR CULT: NORMAL

## 2023-04-24 ENCOUNTER — APPOINTMENT (OUTPATIENT)
Dept: PULMONOLOGY | Facility: CLINIC | Age: 68
End: 2023-04-24
Payer: MEDICARE

## 2023-04-24 VITALS
BODY MASS INDEX: 29.49 KG/M2 | HEART RATE: 90 BPM | HEIGHT: 65 IN | SYSTOLIC BLOOD PRESSURE: 150 MMHG | TEMPERATURE: 97.1 F | WEIGHT: 177 LBS | OXYGEN SATURATION: 91 % | RESPIRATION RATE: 15 BRPM | DIASTOLIC BLOOD PRESSURE: 90 MMHG

## 2023-04-24 PROCEDURE — 96372 THER/PROPH/DIAG INJ SC/IM: CPT

## 2023-04-24 RX ADMIN — BENRALIZUMAB 0 MG/ML: 30 INJECTION, SOLUTION SUBCUTANEOUS at 00:00

## 2023-04-28 ENCOUNTER — OUTPATIENT (OUTPATIENT)
Dept: OUTPATIENT SERVICES | Facility: HOSPITAL | Age: 68
LOS: 1 days | End: 2023-04-28
Payer: MEDICARE

## 2023-04-28 VITALS
RESPIRATION RATE: 17 BRPM | HEIGHT: 65 IN | TEMPERATURE: 98 F | DIASTOLIC BLOOD PRESSURE: 112 MMHG | WEIGHT: 166.89 LBS | SYSTOLIC BLOOD PRESSURE: 145 MMHG | HEART RATE: 100 BPM | OXYGEN SATURATION: 95 %

## 2023-04-28 DIAGNOSIS — Z90.710 ACQUIRED ABSENCE OF BOTH CERVIX AND UTERUS: Chronic | ICD-10-CM

## 2023-04-28 DIAGNOSIS — Z01.818 ENCOUNTER FOR OTHER PREPROCEDURAL EXAMINATION: ICD-10-CM

## 2023-04-28 DIAGNOSIS — C50.919 MALIGNANT NEOPLASM OF UNSPECIFIED SITE OF UNSPECIFIED FEMALE BREAST: ICD-10-CM

## 2023-04-28 DIAGNOSIS — Z98.890 OTHER SPECIFIED POSTPROCEDURAL STATES: Chronic | ICD-10-CM

## 2023-04-28 DIAGNOSIS — Z98.89 OTHER SPECIFIED POSTPROCEDURAL STATES: Chronic | ICD-10-CM

## 2023-04-28 DIAGNOSIS — N99.3 PROLAPSE OF VAGINAL VAULT AFTER HYSTERECTOMY: ICD-10-CM

## 2023-04-28 DIAGNOSIS — J44.9 CHRONIC OBSTRUCTIVE PULMONARY DISEASE, UNSPECIFIED: ICD-10-CM

## 2023-04-28 DIAGNOSIS — C50.919 MALIGNANT NEOPLASM OF UNSPECIFIED SITE OF UNSPECIFIED FEMALE BREAST: Chronic | ICD-10-CM

## 2023-04-28 PROCEDURE — 86900 BLOOD TYPING SEROLOGIC ABO: CPT

## 2023-04-28 PROCEDURE — 86901 BLOOD TYPING SEROLOGIC RH(D): CPT

## 2023-04-28 PROCEDURE — 86850 RBC ANTIBODY SCREEN: CPT

## 2023-04-28 PROCEDURE — G0463: CPT

## 2023-04-28 RX ORDER — CHLORHEXIDINE GLUCONATE 213 G/1000ML
1 SOLUTION TOPICAL ONCE
Refills: 0 | Status: DISCONTINUED | OUTPATIENT
Start: 2023-05-18 | End: 2023-05-19

## 2023-04-28 RX ORDER — CEFAZOLIN SODIUM 1 G
2000 VIAL (EA) INJECTION ONCE
Refills: 0 | Status: COMPLETED | OUTPATIENT
Start: 2023-05-18 | End: 2023-05-18

## 2023-04-28 RX ORDER — BENRALIZUMAB 30 MG/ML
30 INJECTION, SOLUTION SUBCUTANEOUS
Refills: 0 | Status: COMPLETED | OUTPATIENT
Start: 2023-04-24

## 2023-04-28 RX ORDER — SODIUM CHLORIDE 9 MG/ML
3 INJECTION INTRAMUSCULAR; INTRAVENOUS; SUBCUTANEOUS EVERY 8 HOURS
Refills: 0 | Status: DISCONTINUED | OUTPATIENT
Start: 2023-05-18 | End: 2023-05-18

## 2023-04-28 RX ORDER — LIDOCAINE HCL 20 MG/ML
0.2 VIAL (ML) INJECTION ONCE
Refills: 0 | Status: DISCONTINUED | OUTPATIENT
Start: 2023-05-18 | End: 2023-05-18

## 2023-04-28 RX ORDER — ALBUTEROL 90 UG/1
2 AEROSOL, METERED ORAL
Qty: 0 | Refills: 0 | DISCHARGE

## 2023-04-28 NOTE — HISTORY OF PRESENT ILLNESS
[TextBox_4] : 67-year-old female who was severe or persistent eosinophilic asthma currently now off prednisone, here to start Fasenra injections, accompanied by her son.   \par \par Patient In the past after one injection of Nucala she had what appeared to be a vasovagal reaction, plan to monitor closely at today's visit. \par \par Doing well today, denies SOB, wheezing, chest pain, fever, chills. Does feel bit of a change in breathing since change in weather. Is taking ICS/LABA inhaler daily, albuterol prn. Lungs CTA.

## 2023-04-28 NOTE — ASSESSMENT
[FreeTextEntry1] : 1.) Asthma, started on Fasenra today \par - Continue Fasenra q month x 2 doses, then every 8 weeks thereafter \par - Continue Fluticasone Salmeterol daily, albuterol prn\par - Follow up with Dr. Aguirre in May prior to sx, call office if any questions or concerns before that time

## 2023-04-28 NOTE — H&P PST ADULT - ASSESSMENT
DASI: walk, light housework  Mets 6  Symptoms : Denies SOB, NUNEZ, palpitations  Airway : no airway abnormalities , denies prior anesthesia complications   Mallampati : 3  * one tooth can be removed pt to leave at home     Corneal abrasion risk : Denies

## 2023-04-28 NOTE — PROCEDURE
[FreeTextEntry1] : Indication and use of Fasenra autoinjector reviewed with patient and son. Proper injection technique, injection site selection and rotation, medication storage and disposal, and dosing schedule reviewed. Written education materials and resources provided. Contact info for [facility] specialty pharmacy given. \par \par RN administered Fasenra 30 mg autoinj to left lower abdomen \par Lot # [id number]\par Exp: 04/2025\par NDC # 2900-9212-00\par \par Pt tolerated injection well. Monitored for 1 hour post injection. No s/s adverse reaction. Epi on hand and brought to visit. Epi pen use reviewed, advised is to be used in event of anaphylactic/severe allergic reaction. Pt and son verbalized understanding of all instructions. All questions and concerns addressed.

## 2023-04-28 NOTE — H&P PST ADULT - NSICDXPASTSURGICALHX_GEN_ALL_CORE_FT
PAST SURGICAL HISTORY:  History of vascular access device     S/P hernia repair     S/P lumpectomy, right breast     S/P lymph node biopsy     S/P JAMEY (total abdominal hysterectomy)

## 2023-04-28 NOTE — H&P PST ADULT - NSICDXPASTMEDICALHX_GEN_ALL_CORE_FT
PAST MEDICAL HISTORY:  2019 novel coronavirus disease (COVID-19)     Asthma     Benign hypertension     Breast cancer     Carcinoma of right breast treated with adjuvant chemotherapy     HIV (human immunodeficiency virus infection)

## 2023-04-28 NOTE — H&P PST ADULT - HISTORY OF PRESENT ILLNESS
67 year old female with hx of HIV (viral load undetectable), COVID ( about 2 years ago required hospital stay and monoclonal antibodies ) HTN, Asthma, Right Breast Cancer (s/p right lumpectomy with nodes, chemo and radiation) , Prolapse of vaginal vault after hysterectomy. Was using a pessary no longer likes it, desires surgery.

## 2023-05-16 ENCOUNTER — APPOINTMENT (OUTPATIENT)
Dept: PULMONOLOGY | Facility: CLINIC | Age: 68
End: 2023-05-16
Payer: MEDICARE

## 2023-05-16 ENCOUNTER — NON-APPOINTMENT (OUTPATIENT)
Age: 68
End: 2023-05-16

## 2023-05-16 VITALS
RESPIRATION RATE: 16 BRPM | BODY MASS INDEX: 28.49 KG/M2 | TEMPERATURE: 97.6 F | DIASTOLIC BLOOD PRESSURE: 88 MMHG | SYSTOLIC BLOOD PRESSURE: 147 MMHG | HEIGHT: 65 IN | OXYGEN SATURATION: 95 % | WEIGHT: 171 LBS | HEART RATE: 94 BPM

## 2023-05-16 DIAGNOSIS — J45.50 SEVERE PERSISTENT ASTHMA, UNCOMPLICATED: ICD-10-CM

## 2023-05-16 LAB — SARS-COV-2 N GENE NPH QL NAA+PROBE: NOT DETECTED

## 2023-05-16 PROCEDURE — 99214 OFFICE O/P EST MOD 30 MIN: CPT | Mod: GC

## 2023-05-16 RX ORDER — PREDNISONE 10 MG/1
10 TABLET ORAL
Qty: 9 | Refills: 0 | Status: ACTIVE | COMMUNITY
Start: 2023-02-23 | End: 1900-01-01

## 2023-05-16 NOTE — ASSESSMENT
[FreeTextEntry1] : 68YO Female never smoker PMH uterine prolapse severe persistent eosinophilic asthma currently now off prednisone and who started Fasenra injections 4/28/23 who presents for follow up. \par \par #Eosinophilic Asthma\par #Pending Gyn sugery\par - She reports doing well with her Fasenra injections and is overall improving \par - C/w Fasenra injections when she gets her next dose mailed to her she will come in to to office to have it administered\par - She is medically clear and is optimized from a Pulmonary perspective for her procedure 5/18/2023\par - Rx: start Prednisone 30mg starting tomorrow evening. She should then take it the day of surgery 5/18 in the morning prior to her procedure and again the day after. Instructions were given to her\par \par Case discussed with Dr. Aguirre

## 2023-05-16 NOTE — HISTORY OF PRESENT ILLNESS
[TextBox_4] : 66YO Female never smoker PMH uterine prolapse severe persistent eosinophilic asthma currently now off prednisone and who started Fasenra injections 4/28/23 who presents for follow up. \par \par She states she tolerated the Fasenra well without vasovagal symptoms which she had with the Nucala. She is interested in continuing with Fasenra. \par She states that since starting Fasenra she still cannot go fast but her breathing is overall better than it was previously. She is not quite as good as she was on Prednisone but understands she has only gotten one dose. She is still able to walk 20 city blocks but at a slower pace. \par She is due for her second injection this month and then will go on to P2drfub. \par She denies fevers, chills, rashes, cough, sputum production or wheezing. \par Last asthma exacerbation 2/23\par \par She is planned for robot-assisted sacrocolpopexy, cystoscopy, possible anterior/posterior repair, laparotomy with Gyn 5/18/2023. She was told she would need pre and post operative Prednisone.

## 2023-05-16 NOTE — END OF VISIT
[] : Fellow [FreeTextEntry3] : I spent a total of 30 minutes with this patient contact including face-to-face time, reviewing her prior lung function studies, medication, communication with surgery and documentation

## 2023-05-16 NOTE — REVIEW OF SYSTEMS
[Fever] : no fever [Chills] : no chills [Cough] : no cough [Sputum] : no sputum [Dyspnea] : no dyspnea [Abdominal Pain] : no abdominal pain

## 2023-05-16 NOTE — PHYSICAL EXAM
[No Acute Distress] : no acute distress [Normal Rate/Rhythm] : normal rate/rhythm [Normal S1, S2] : normal s1, s2 [No Resp Distress] : no resp distress [Clear to Auscultation Bilaterally] : clear to auscultation bilaterally [Normal Gait] : normal gait [No Clubbing] : no clubbing [No Edema] : no edema [Normal Appearance] : normal appearance

## 2023-05-17 ENCOUNTER — NON-APPOINTMENT (OUTPATIENT)
Age: 68
End: 2023-05-17

## 2023-05-17 ENCOUNTER — TRANSCRIPTION ENCOUNTER (OUTPATIENT)
Age: 68
End: 2023-05-17

## 2023-05-18 ENCOUNTER — OUTPATIENT (OUTPATIENT)
Dept: INPATIENT UNIT | Facility: HOSPITAL | Age: 68
LOS: 1 days | End: 2023-05-18
Payer: MEDICARE

## 2023-05-18 ENCOUNTER — APPOINTMENT (OUTPATIENT)
Dept: UROGYNECOLOGY | Facility: HOSPITAL | Age: 68
End: 2023-05-18
Payer: MEDICARE

## 2023-05-18 VITALS
HEART RATE: 89 BPM | TEMPERATURE: 99 F | HEIGHT: 65 IN | DIASTOLIC BLOOD PRESSURE: 89 MMHG | RESPIRATION RATE: 16 BRPM | OXYGEN SATURATION: 98 % | SYSTOLIC BLOOD PRESSURE: 157 MMHG | WEIGHT: 166.89 LBS

## 2023-05-18 DIAGNOSIS — Z98.89 OTHER SPECIFIED POSTPROCEDURAL STATES: Chronic | ICD-10-CM

## 2023-05-18 DIAGNOSIS — Z90.710 ACQUIRED ABSENCE OF BOTH CERVIX AND UTERUS: Chronic | ICD-10-CM

## 2023-05-18 DIAGNOSIS — N99.3 PROLAPSE OF VAGINAL VAULT AFTER HYSTERECTOMY: ICD-10-CM

## 2023-05-18 DIAGNOSIS — Z98.890 OTHER SPECIFIED POSTPROCEDURAL STATES: Chronic | ICD-10-CM

## 2023-05-18 LAB
HCT VFR BLD CALC: 30.5 % — LOW (ref 34.5–45)
HGB BLD-MCNC: 10.2 G/DL — LOW (ref 11.5–15.5)

## 2023-05-18 PROCEDURE — 57425 LAPAROSCOPY SURG COLPOPEXY: CPT

## 2023-05-18 DEVICE — MESH UPSYLON Y: Type: IMPLANTABLE DEVICE | Status: FUNCTIONAL

## 2023-05-18 DEVICE — SURGIFLO MATRIX WITH THROMBIN KIT: Type: IMPLANTABLE DEVICE | Status: FUNCTIONAL

## 2023-05-18 RX ORDER — PANTOPRAZOLE SODIUM 20 MG/1
40 TABLET, DELAYED RELEASE ORAL
Refills: 0 | Status: DISCONTINUED | OUTPATIENT
Start: 2023-05-18 | End: 2023-05-19

## 2023-05-18 RX ORDER — SODIUM CHLORIDE 9 MG/ML
1000 INJECTION, SOLUTION INTRAVENOUS
Refills: 0 | Status: DISCONTINUED | OUTPATIENT
Start: 2023-05-18 | End: 2023-05-19

## 2023-05-18 RX ORDER — POLYETHYLENE GLYCOL 3350 17 G/17G
17 POWDER, FOR SOLUTION ORAL AT BEDTIME
Refills: 0 | Status: DISCONTINUED | OUTPATIENT
Start: 2023-05-18 | End: 2023-05-19

## 2023-05-18 RX ORDER — ONDANSETRON 8 MG/1
4 TABLET, FILM COATED ORAL ONCE
Refills: 0 | Status: DISCONTINUED | OUTPATIENT
Start: 2023-05-18 | End: 2023-05-18

## 2023-05-18 RX ORDER — FENTANYL CITRATE 50 UG/ML
25 INJECTION INTRAVENOUS ONCE
Refills: 0 | Status: DISCONTINUED | OUTPATIENT
Start: 2023-05-18 | End: 2023-05-18

## 2023-05-18 RX ORDER — SENNA PLUS 8.6 MG/1
1 TABLET ORAL AT BEDTIME
Refills: 0 | Status: DISCONTINUED | OUTPATIENT
Start: 2023-05-18 | End: 2023-05-19

## 2023-05-18 RX ORDER — FLUTICASONE PROPIONATE 50 MCG
2 SPRAY, SUSPENSION NASAL EVERY 12 HOURS
Refills: 0 | Status: DISCONTINUED | OUTPATIENT
Start: 2023-05-18 | End: 2023-05-19

## 2023-05-18 RX ORDER — ONDANSETRON 8 MG/1
4 TABLET, FILM COATED ORAL EVERY 6 HOURS
Refills: 0 | Status: DISCONTINUED | OUTPATIENT
Start: 2023-05-18 | End: 2023-05-19

## 2023-05-18 RX ORDER — SIMETHICONE 80 MG/1
80 TABLET, CHEWABLE ORAL EVERY 6 HOURS
Refills: 0 | Status: DISCONTINUED | OUTPATIENT
Start: 2023-05-18 | End: 2023-05-19

## 2023-05-18 RX ORDER — ALBUTEROL 90 UG/1
2 AEROSOL, METERED ORAL EVERY 6 HOURS
Refills: 0 | Status: DISCONTINUED | OUTPATIENT
Start: 2023-05-18 | End: 2023-05-19

## 2023-05-18 RX ORDER — AMLODIPINE BESYLATE 2.5 MG/1
5 TABLET ORAL DAILY
Refills: 0 | Status: DISCONTINUED | OUTPATIENT
Start: 2023-05-18 | End: 2023-05-19

## 2023-05-18 RX ORDER — OXYCODONE HYDROCHLORIDE 5 MG/1
10 TABLET ORAL EVERY 4 HOURS
Refills: 0 | Status: DISCONTINUED | OUTPATIENT
Start: 2023-05-18 | End: 2023-05-19

## 2023-05-18 RX ORDER — BICTEGRAVIR SODIUM, EMTRICITABINE, AND TENOFOVIR ALAFENAMIDE FUMARATE 30; 120; 15 MG/1; MG/1; MG/1
1 TABLET ORAL DAILY
Refills: 0 | Status: DISCONTINUED | OUTPATIENT
Start: 2023-05-18 | End: 2023-05-19

## 2023-05-18 RX ORDER — KETOROLAC TROMETHAMINE 30 MG/ML
15 SYRINGE (ML) INJECTION EVERY 8 HOURS
Refills: 0 | Status: DISCONTINUED | OUTPATIENT
Start: 2023-05-18 | End: 2023-05-18

## 2023-05-18 RX ORDER — HYDROMORPHONE HYDROCHLORIDE 2 MG/ML
0.5 INJECTION INTRAMUSCULAR; INTRAVENOUS; SUBCUTANEOUS
Refills: 0 | Status: DISCONTINUED | OUTPATIENT
Start: 2023-05-18 | End: 2023-05-18

## 2023-05-18 RX ORDER — SODIUM CHLORIDE 9 MG/ML
1000 INJECTION, SOLUTION INTRAVENOUS
Refills: 0 | Status: DISCONTINUED | OUTPATIENT
Start: 2023-05-18 | End: 2023-05-18

## 2023-05-18 RX ORDER — OXYCODONE HYDROCHLORIDE 5 MG/1
5 TABLET ORAL
Refills: 0 | Status: DISCONTINUED | OUTPATIENT
Start: 2023-05-18 | End: 2023-05-19

## 2023-05-18 RX ORDER — ACETAMINOPHEN 500 MG
1000 TABLET ORAL EVERY 6 HOURS
Refills: 0 | Status: DISCONTINUED | OUTPATIENT
Start: 2023-05-18 | End: 2023-05-19

## 2023-05-18 RX ORDER — IPRATROPIUM/ALBUTEROL SULFATE 18-103MCG
3 AEROSOL WITH ADAPTER (GRAM) INHALATION ONCE
Refills: 0 | Status: COMPLETED | OUTPATIENT
Start: 2023-05-18 | End: 2023-05-18

## 2023-05-18 RX ORDER — MOMETASONE FUROATE 220 UG/1
1 INHALANT RESPIRATORY (INHALATION) DAILY
Refills: 0 | Status: DISCONTINUED | OUTPATIENT
Start: 2023-05-18 | End: 2023-05-19

## 2023-05-18 RX ORDER — OXYCODONE HYDROCHLORIDE 5 MG/1
1 TABLET ORAL
Qty: 6 | Refills: 0
Start: 2023-05-18

## 2023-05-18 RX ADMIN — Medication 15 MILLIGRAM(S): at 22:30

## 2023-05-18 RX ADMIN — Medication 3 MILLILITER(S): at 18:00

## 2023-05-18 RX ADMIN — Medication 2 SPRAY(S): at 21:15

## 2023-05-18 RX ADMIN — FENTANYL CITRATE 25 MICROGRAM(S): 50 INJECTION INTRAVENOUS at 17:59

## 2023-05-18 RX ADMIN — Medication 15 MILLIGRAM(S): at 21:14

## 2023-05-18 RX ADMIN — SODIUM CHLORIDE 75 MILLILITER(S): 9 INJECTION, SOLUTION INTRAVENOUS at 17:58

## 2023-05-18 RX ADMIN — MOMETASONE FUROATE 1 PUFF(S): 220 INHALANT RESPIRATORY (INHALATION) at 19:10

## 2023-05-18 RX ADMIN — ONDANSETRON 4 MILLIGRAM(S): 8 TABLET, FILM COATED ORAL at 19:03

## 2023-05-18 RX ADMIN — FENTANYL CITRATE 25 MICROGRAM(S): 50 INJECTION INTRAVENOUS at 18:00

## 2023-05-18 NOTE — DISCHARGE NOTE PROVIDER - CARE PROVIDER_API CALL
Marie Keith (MD)  Female Pelvic MedReconst Surg; Obstetrics and Gynecology  [address], [address]  [place] [address]  Phone: [phone]  Fax: [phone]  Follow Up Time: Routine

## 2023-05-18 NOTE — DISCHARGE NOTE PROVIDER - NSDCCPTREATMENT_GEN_ALL_CORE_FT
PRINCIPAL PROCEDURE  Procedure: Robot-assisted sacrocolpopexy  Findings and Treatment: with cystoscopy      SECONDARY PROCEDURE  Procedure: Lysis of pelvic adhesions  Findings and Treatment:

## 2023-05-18 NOTE — PATIENT PROFILE ADULT - HOW PATIENT ADDRESSED, PROFILE
Kelvin Fair is a 28 y.o. female , id x 2(name and ). Reviewed record, history, and  medications. Chief Complaint   Patient presents with    Weight Management     4 week f/up, is not fasting     Abdominal Pain     states that she is having stomach pains after eating        Vitals:    21 0731   BP: 128/87   Pulse: 93   Temp: 99.3 °F (37.4 °C)   SpO2: 98%   Weight: 283 lb 1.6 oz (128.4 kg)   Height: 5' 3\" (1.6 m)       Coordination of Care Questionnaire:   1) Have you been to an emergency room, urgent care, or hospitalized since your last visit?   no       2. Have seen or consulted any other health care provider since your last visit? NO      3 most recent PHQ Screens 2021   Little interest or pleasure in doing things Not at all   Feeling down, depressed, irritable, or hopeless Not at all   Total Score PHQ 2 0       Patient is accompanied by self I have received verbal consent from Kelvin Fair to discuss any/all medical information while they are present in the room. lisa

## 2023-05-18 NOTE — DISCHARGE NOTE PROVIDER - NSDCFUADDINST_GEN_ALL_CORE_FT
Return to your regular way of eating.  Resume normal activity as tolerated, but no heavy lifting or strenuous activity for 6 weeks.  No driving for next 2 weeks and/or while on narcotic pain medication.  Complete vaginal rest, no tampons, no douching, no tub bathing, no sexual activities for 6 weeks unless otherwise instructed by your doctor.  Call your doctor with any signs and symptoms of infection such as fever, chills, nausea or vomiting.  Call your doctor with redness or swelling at the incision site, fluid leakage or wound separation.  Call your doctor if you're unable to tolerate food or have difficulty urinating.  Call your doctor if you have pain that is not relieved by your prescribed medications.  Notify your doctor with any other concerns.  Follow up with Dr. Keith in UroGyn office.

## 2023-05-18 NOTE — PRE-OP CHECKLIST - VIA
"Pt called; left message stating that she needs her insulin and Kerri has been giving her samples - 3 pens, as it is too expensive for her.  She goes on to say that she has been \"crashing a lot\".  Will return pt phone call.  Lian Park  " stretcher

## 2023-05-18 NOTE — BRIEF OPERATIVE NOTE - OPERATION/FINDINGS
Cystoscopy demonstrating ureteral orifices emanating clear efflux b/l. Normal bladder mucosa with no evidence of injury, suture, foreign body, or tumor.    ROSALVA no sutures or injury

## 2023-05-18 NOTE — DISCHARGE NOTE PROVIDER - NSDCFUSCHEDAPPT_GEN_ALL_CORE_FT
[facility]  UROGYN [address]  Scheduled Appointment: 05/30/2023

## 2023-05-18 NOTE — BRIEF OPERATIVE NOTE - NSICDXBRIEFPROCEDURE_GEN_ALL_CORE_FT
PROCEDURES:  Robot-assisted sacrocolpopexy 18-May-2023 16:28:03 with cystoscopy Alyx Rouse  Lysis of pelvic adhesions 18-May-2023 16:28:39  Alyx Rouse

## 2023-05-18 NOTE — DISCHARGE NOTE PROVIDER - HOSPITAL COURSE
Patient presented for scheduled procedure. Patient underwent uncomplicated sacrocolpopexy and cystoscopy. Patient tolerated the procedure well and was taken to the recovery room in stable condition. On POD#0 patient tolerated PO. Rees remained in situ. Post operative H/H was stable compared to preoperative H/H. Patient was taken to the floor in stable condition.    On POD#1 patient's lab work was stable compared to preoperative levels. Patient had a trial of void, which she subsequently passed. Patient tolerated PO and ambulated. Patient was reaching all post operative milestones and was discharged home in stable condition. Patient was discharged home with close UroGYN follow up with Dr Keith.

## 2023-05-18 NOTE — PATIENT PROFILE ADULT - FALL HARM RISK - UNIVERSAL INTERVENTIONS
Bed in lowest position, wheels locked, appropriate side rails in place/Call bell, personal items and telephone in reach/Instruct patient to call for assistance before getting out of bed or chair/Non-slip footwear when patient is out of bed/Bryce to call system/Physically safe environment - no spills, clutter or unnecessary equipment/Purposeful Proactive Rounding/Room/bathroom lighting operational, light cord in reach

## 2023-05-18 NOTE — DISCHARGE NOTE PROVIDER - NSDCCPCAREPLAN_GEN_ALL_CORE_FT
PRINCIPAL DISCHARGE DIAGNOSIS  Diagnosis: Prolapse of vaginal vault after hysterectomy  Assessment and Plan of Treatment:       SECONDARY DISCHARGE DIAGNOSES  Diagnosis: Breast cancer  Assessment and Plan of Treatment:     Diagnosis: Chronic obstructive asthma  Assessment and Plan of Treatment:

## 2023-05-18 NOTE — DISCHARGE NOTE PROVIDER - NSDCMRMEDTOKEN_GEN_ALL_CORE_FT
Albuterol (Eqv-ProAir HFA) 90 mcg/inh inhalation aerosol: 2 puff(s) inhaled prn as needed for asthma  amLODIPine 5 mg oral tablet: 1 tab(s) orally once a day  Biktarvy 50 mg-200 mg-25 mg oral tablet: 1 tab(s) orally once a day  calcium (as carbonate) 500 mg oral tablet: 1 tab(s) orally 2 times a day  ergocalciferol 50 mcg (2000 intl units) oral capsule: 1 tab(s) orally once a day  Fasenra Pen 30 mg/mL subcutaneous solution: 30 subcutaneously once a month  fluticasone 100 mcg/inh inhalation powder: 1 puff(s) inhaled once a day  fluticasone 93 mcg/inh nasal spray: 2 spray(s) in each nostril 2 times a day  Motrin 600 mg oral tablet: 1 tab(s) orally every 6 hours as needed for  moderate pain  omeprazole 20 mg oral delayed release tablet: 1 tab(s) orally once a day  oxyCODONE 5 mg oral tablet: 1 tab(s) orally every 6 hours as needed for  severe pain MDD: 4 pills  Tylenol 500 mg oral tablet: 2 orally every 6 hours as needed for  moderate pain  vitamin E 600 intl units oral capsule: 1 tab(s) orally once a day

## 2023-05-19 ENCOUNTER — TRANSCRIPTION ENCOUNTER (OUTPATIENT)
Age: 68
End: 2023-05-19

## 2023-05-19 VITALS — RESPIRATION RATE: 18 BRPM | HEART RATE: 77 BPM | OXYGEN SATURATION: 96 %

## 2023-05-19 LAB
ANION GAP SERPL CALC-SCNC: 13 MMOL/L — SIGNIFICANT CHANGE UP (ref 5–17)
BASOPHILS # BLD AUTO: 0.02 K/UL — SIGNIFICANT CHANGE UP (ref 0–0.2)
BASOPHILS NFR BLD AUTO: 0.2 % — SIGNIFICANT CHANGE UP (ref 0–2)
BUN SERPL-MCNC: 13 MG/DL — SIGNIFICANT CHANGE UP (ref 7–23)
CALCIUM SERPL-MCNC: 8.8 MG/DL — SIGNIFICANT CHANGE UP (ref 8.4–10.5)
CHLORIDE SERPL-SCNC: 106 MMOL/L — SIGNIFICANT CHANGE UP (ref 96–108)
CO2 SERPL-SCNC: 23 MMOL/L — SIGNIFICANT CHANGE UP (ref 22–31)
CREAT SERPL-MCNC: 0.96 MG/DL — SIGNIFICANT CHANGE UP (ref 0.5–1.3)
EGFR: 65 ML/MIN/1.73M2 — SIGNIFICANT CHANGE UP
EOSINOPHIL # BLD AUTO: 0 K/UL — SIGNIFICANT CHANGE UP (ref 0–0.5)
EOSINOPHIL NFR BLD AUTO: 0 % — SIGNIFICANT CHANGE UP (ref 0–6)
GLUCOSE SERPL-MCNC: 119 MG/DL — HIGH (ref 70–99)
HCT VFR BLD CALC: 28 % — LOW (ref 34.5–45)
HCT VFR BLD CALC: 28.8 % — LOW (ref 34.5–45)
HGB BLD-MCNC: 8.9 G/DL — LOW (ref 11.5–15.5)
HGB BLD-MCNC: 9.1 G/DL — LOW (ref 11.5–15.5)
IMM GRANULOCYTES NFR BLD AUTO: 0.7 % — SIGNIFICANT CHANGE UP (ref 0–0.9)
LYMPHOCYTES # BLD AUTO: 1.19 K/UL — SIGNIFICANT CHANGE UP (ref 1–3.3)
LYMPHOCYTES # BLD AUTO: 10.7 % — LOW (ref 13–44)
MCHC RBC-ENTMCNC: 31.1 PG — SIGNIFICANT CHANGE UP (ref 27–34)
MCHC RBC-ENTMCNC: 31.3 PG — SIGNIFICANT CHANGE UP (ref 27–34)
MCHC RBC-ENTMCNC: 31.6 GM/DL — LOW (ref 32–36)
MCHC RBC-ENTMCNC: 31.8 GM/DL — LOW (ref 32–36)
MCV RBC AUTO: 98.3 FL — SIGNIFICANT CHANGE UP (ref 80–100)
MCV RBC AUTO: 98.6 FL — SIGNIFICANT CHANGE UP (ref 80–100)
MONOCYTES # BLD AUTO: 1.16 K/UL — HIGH (ref 0–0.9)
MONOCYTES NFR BLD AUTO: 10.4 % — SIGNIFICANT CHANGE UP (ref 2–14)
NEUTROPHILS # BLD AUTO: 8.67 K/UL — HIGH (ref 1.8–7.4)
NEUTROPHILS NFR BLD AUTO: 78 % — HIGH (ref 43–77)
NRBC # BLD: 0 /100 WBCS — SIGNIFICANT CHANGE UP (ref 0–0)
NRBC # BLD: 0 /100 WBCS — SIGNIFICANT CHANGE UP (ref 0–0)
PLATELET # BLD AUTO: 309 K/UL — SIGNIFICANT CHANGE UP (ref 150–400)
PLATELET # BLD AUTO: 309 K/UL — SIGNIFICANT CHANGE UP (ref 150–400)
POTASSIUM SERPL-MCNC: 4.1 MMOL/L — SIGNIFICANT CHANGE UP (ref 3.5–5.3)
POTASSIUM SERPL-SCNC: 4.1 MMOL/L — SIGNIFICANT CHANGE UP (ref 3.5–5.3)
RBC # BLD: 2.84 M/UL — LOW (ref 3.8–5.2)
RBC # BLD: 2.93 M/UL — LOW (ref 3.8–5.2)
RBC # FLD: 17.4 % — HIGH (ref 10.3–14.5)
RBC # FLD: 17.6 % — HIGH (ref 10.3–14.5)
SODIUM SERPL-SCNC: 142 MMOL/L — SIGNIFICANT CHANGE UP (ref 135–145)
WBC # BLD: 11.12 K/UL — HIGH (ref 3.8–10.5)
WBC # BLD: 12.57 K/UL — HIGH (ref 3.8–10.5)
WBC # FLD AUTO: 11.12 K/UL — HIGH (ref 3.8–10.5)
WBC # FLD AUTO: 12.57 K/UL — HIGH (ref 3.8–10.5)

## 2023-05-19 PROCEDURE — 57425 LAPAROSCOPY SURG COLPOPEXY: CPT

## 2023-05-19 PROCEDURE — 80048 BASIC METABOLIC PNL TOTAL CA: CPT

## 2023-05-19 PROCEDURE — C9399: CPT

## 2023-05-19 PROCEDURE — C1781: CPT

## 2023-05-19 PROCEDURE — 86901 BLOOD TYPING SEROLOGIC RH(D): CPT

## 2023-05-19 PROCEDURE — C1889: CPT

## 2023-05-19 PROCEDURE — 86900 BLOOD TYPING SEROLOGIC ABO: CPT

## 2023-05-19 PROCEDURE — 85027 COMPLETE CBC AUTOMATED: CPT

## 2023-05-19 PROCEDURE — 94640 AIRWAY INHALATION TREATMENT: CPT

## 2023-05-19 PROCEDURE — S2900: CPT

## 2023-05-19 PROCEDURE — 86850 RBC ANTIBODY SCREEN: CPT

## 2023-05-19 PROCEDURE — 36415 COLL VENOUS BLD VENIPUNCTURE: CPT

## 2023-05-19 PROCEDURE — 85018 HEMOGLOBIN: CPT

## 2023-05-19 PROCEDURE — 85014 HEMATOCRIT: CPT

## 2023-05-19 PROCEDURE — 85025 COMPLETE CBC W/AUTO DIFF WBC: CPT

## 2023-05-19 RX ADMIN — Medication 1000 MILLIGRAM(S): at 00:01

## 2023-05-19 RX ADMIN — AMLODIPINE BESYLATE 5 MILLIGRAM(S): 2.5 TABLET ORAL at 06:13

## 2023-05-19 RX ADMIN — OXYCODONE HYDROCHLORIDE 5 MILLIGRAM(S): 5 TABLET ORAL at 15:53

## 2023-05-19 RX ADMIN — Medication 1000 MILLIGRAM(S): at 06:11

## 2023-05-19 RX ADMIN — PANTOPRAZOLE SODIUM 40 MILLIGRAM(S): 20 TABLET, DELAYED RELEASE ORAL at 06:15

## 2023-05-19 RX ADMIN — Medication 1000 MILLIGRAM(S): at 00:31

## 2023-05-19 RX ADMIN — Medication 30 MILLIGRAM(S): at 06:13

## 2023-05-19 RX ADMIN — BICTEGRAVIR SODIUM, EMTRICITABINE, AND TENOFOVIR ALAFENAMIDE FUMARATE 1 TABLET(S): 30; 120; 15 TABLET ORAL at 11:13

## 2023-05-19 RX ADMIN — OXYCODONE HYDROCHLORIDE 5 MILLIGRAM(S): 5 TABLET ORAL at 16:45

## 2023-05-19 RX ADMIN — OXYCODONE HYDROCHLORIDE 5 MILLIGRAM(S): 5 TABLET ORAL at 11:14

## 2023-05-19 RX ADMIN — Medication 1000 MILLIGRAM(S): at 06:41

## 2023-05-19 RX ADMIN — Medication 2 SPRAY(S): at 06:13

## 2023-05-19 RX ADMIN — OXYCODONE HYDROCHLORIDE 5 MILLIGRAM(S): 5 TABLET ORAL at 12:00

## 2023-05-19 RX ADMIN — OXYCODONE HYDROCHLORIDE 5 MILLIGRAM(S): 5 TABLET ORAL at 04:14

## 2023-05-19 RX ADMIN — MOMETASONE FUROATE 1 PUFF(S): 220 INHALANT RESPIRATORY (INHALATION) at 13:19

## 2023-05-19 RX ADMIN — OXYCODONE HYDROCHLORIDE 5 MILLIGRAM(S): 5 TABLET ORAL at 04:29

## 2023-05-19 RX ADMIN — Medication 1000 MILLIGRAM(S): at 12:00

## 2023-05-19 RX ADMIN — Medication 1000 MILLIGRAM(S): at 11:14

## 2023-05-19 NOTE — CHART NOTE - NSCHARTNOTEFT_GEN_A_CORE
Delayed entry 2/2 clinical duties.   Patient initially seen and evaluated at bedside at approximately 9PM.    8PM H/H noted 10.2/30.5 from 11.8/37.7.     Patient found resting comfortably in bed.   Denies abdominal pain.   Denies lightheadedness, dizziness or shortness of breath.   No acute complaints.   Tolerating PO.     VS  T(C): 37 (05-18-23 @ 19:56)  HR: 85 (05-18-23 @ 19:56)  BP: 109/55 (05-18-23 @ 19:30)  RR: 16 (05-18-23 @ 19:56)  SpO2: 100% (05-18-23 @ 19:56)    Gen: A&Ox3 NAD well appearing  CV: RRR  Pulm: Respirations even, unlabored  Abd: Soft, nontender. Incisions c/d/i  : Clear urine output in perez, adequate UOP  Ext: Nontender, no edema    A/P: 66yo F now POD#0 s/p RA SCP and Cystoscopy with greater than expected decline in H/H postoperatively. EBL intra-op 100cc. VSS and reassuring physical exam post-operatively, given reassuring overall clinical satus at this time no acute concern for ongoing bleeding.    - Will reevaluate at approximately 11PM    - AM CBC/BMP ordered    - VS monitoring per routine     dw Dr. Rouse, Urogynecology Fellow   Susan Nash MD, PGY4     --     Addendum (11:15PM)   Patient seen and reevaluated at bedside at approximately 11PM.     Remains in bed, resting comfortably. Notes mild abdominal discomfort however denies pain. Denies lightheadedness or dizziness. Continues to tolerate PO. Has not yet been OOB.     Gen: A&Ox3, sleeping in bed, well appearing   CV: RRR  Pulm: Respirations even, unlabored  Abd: Soft, nontender. Non-distended. Incisions c/d/i   : Clear urine output in Perez, additional 225cc in bag   Ext: Nontender, no edema      - Continue VS monitoring per routine    - AM CBC/BMP ordered     Dw Dr. Rouse, Urogynecology Fellow   Susan Nash MD, PGY4
TOV Note    Active Trial of Void performed.   300cc NS instilled into the bladder by gravity.  Rees D/C'd  Pt voided   350 cc   Rees catheter  to remain out.
R1 GYN POST-OP CHECK NOTE    SUBJECTIVE:    66yo F now POD0 s/p RA SCP, cysto     Pt reports pain well controlled by pain meds.  She is not yet out of bed.  Perez catheter in place.  She is tolerating sips of clear liquids w/o N/V.  She denies fever, chills, nausea, vomiting, headache, dizziness, chest pain, palpitations, shortness of breath.    acetaminophen     Tablet .. 1000 milliGRAM(s) Oral every 6 hours  acetaminophen     Tablet .. 1000 milliGRAM(s) Oral every 6 hours PRN  albuterol    90 MICROgram(s) HFA Inhaler 2 Puff(s) Inhalation every 6 hours PRN  amLODIPine   Tablet 5 milliGRAM(s) Oral daily  bictegravir 50 mG/emtricitabine 200 mG/tenofovir alafenamide 25 mG (BIKTARVY) 1 Tablet(s) Oral daily  chlorhexidine 2% Cloths 1 Application(s) Topical once  fluticasone propionate 50 MICROgram(s)/spray Nasal Spray 2 Spray(s) Both Nostrils every 12 hours  HYDROmorphone  Injectable 0.5 milliGRAM(s) IV Push every 10 minutes PRN  ketorolac   Injectable 15 milliGRAM(s) IV Push every 8 hours  lactated ringers. 1000 milliLiter(s) IV Continuous <Continuous>  lactated ringers. 1000 milliLiter(s) IV Continuous <Continuous>  mometasone 220 MICROgram(s) Inhaler 1 Puff(s) Inhalation daily  ondansetron Injectable 4 milliGRAM(s) IV Push every 6 hours  ondansetron Injectable 4 milliGRAM(s) IV Push once PRN  oxyCODONE    IR 5 milliGRAM(s) Oral every 3 hours PRN  oxyCODONE    IR 10 milliGRAM(s) Oral every 4 hours PRN  pantoprazole    Tablet 40 milliGRAM(s) Oral before breakfast  polyethylene glycol 3350 17 Gram(s) Oral at bedtime PRN  senna 1 Tablet(s) Oral at bedtime PRN  simethicone 80 milliGRAM(s) Chew every 6 hours PRN      OBJECTIVE:    VITAL SIGNS:  Vital Signs Last 24 Hrs  T(C): 36.1 (18 May 2023 19:00), Max: 37 (18 May 2023 11:05)  T(F): 97 (18 May 2023 19:00), Max: 98.6 (18 May 2023 11:05)  HR: 80 (18 May 2023 19:00) (68 - 89)  BP: 110/56 (18 May 2023 19:00) (102/61 - 157/89)  BP(mean): 76 (18 May 2023 19:00) (76 - 95)  RR: 16 (18 May 2023 19:00) (14 - 17)  SpO2: 100% (18 May 2023 19:00) (98% - 100%)    Parameters below as of 18 May 2023 19:00  Patient On (Oxygen Delivery Method): room air      CAPILLARY BLOOD GLUCOSE          05-18-23 @ 07:01  -  05-18-23 @ 19:14  --------------------------------------------------------  IN: 300 mL / OUT: 220 mL / NET: 80 mL        PHYSICAL EXAM:  GEN: NAD, A&Ox3  CV: RRR, no m/g/r  LUNGS: CTAB  ABD: soft, appropriately tender, ND, +BS  Incision: CDI, dressings in place  EXT: WWP, no edema/TTP          ASSESSMENT:  66yo F now POD0 s/p RA SCP, cysto.   Patient is stable and progressing normally postoperatively.    NEURO: pain well controlled on current regimen  CV: hemodyamically stable, f/u 8p H+H  PULM: increase incentive spirometry  GI: advance diet as tolerated; colace/mylicon prn  : continue perez  HEME: SCDs, early ambulation  ID: afebrile    Christina Rubin, PGY2  d/w Dr. Rouse

## 2023-05-19 NOTE — DISCHARGE NOTE NURSING/CASE MANAGEMENT/SOCIAL WORK - PATIENT PORTAL LINK FT
You can access the FollowMyHealth Patient Portal offered by [facility] by registering at the following website: [email or web address]. By joining [employer]’s FollowMyHealth portal, you will also be able to view your health information using other applications (apps) compatible with our system.

## 2023-05-19 NOTE — DISCHARGE NOTE NURSING/CASE MANAGEMENT/SOCIAL WORK - NSDCPNINST_GEN_ALL_CORE
Keep your follow up appointment with doctor as instructed and call doctor for any signs of infection, fevers, chills, difficulty urinating, scope sites, draining, swollen, reddened, warm to touch, strong vaginal odor, difficulty moving your bowels, respiratory or chest discomfort, swelling or pain behind your legs, nausea or vomiting and with any questions or concerns. No heavy lifting, nothing per vagina- no douching, tub bathing, pools or sex, no driving or strenuous activity until cleared by your doctor. No creams or lotions to scope sites and keep them clean, dry and intact.

## 2023-05-19 NOTE — DISCHARGE NOTE NURSING/CASE MANAGEMENT/SOCIAL WORK - NSDCPEFALRISK_GEN_ALL_CORE
For information on Fall & Injury Prevention, visit: [email or web address] OR  [email or web address] OR  https://www.cdc.gov/steadi/patient.html

## 2023-05-19 NOTE — PROGRESS NOTE ADULT - ASSESSMENT
68yo POD #1 s/p robotic assisted sacral colpopexy and cystoscopy.    Neuro: Pain well controlled on current regimen   CV: Hemodynamically stable, H/H 11.8/37.7->10.2/30.5, more than expected for EBL of 100. F/y AM labs.  Pulm: O2 sat WNL on RA, Increase ambulation, encourage incentive spirometry use  GI: Pt to attempt regular diet this AM  : perez in place. Potential d/c after AM labs  Heme: SCDs while in bed  ID: Afebrile, No signs of infection  FEN: LR@75, Replete electrolytes PRN   Dispo: continue routine post-operative care    Demetrice Vieyra, PGY1 66yo POD #1 s/p robotic assisted sacral colpopexy and cystoscopy. Pt doing well post-operatively. Hematocrit dropped more than expected for EBL, will f/u AM labs.     Neuro: Pain well controlled on current regimen   CV: Hemodynamically stable, H/H 11.8/37.7->10.2/30.5, more than expected for EBL of 100. F/u AM labs. Continue Amlodipine.  Pulm: O2 sat WNL on RA, Increase ambulation, encourage incentive spirometry use. Continue Prednisone 30mg for one day post-op for severe asthma. Continue Albuterol, Flonase, Fasenra, Asmanex.  GI: Pt to attempt regular diet this AM. Continue Protonix  : perez in place. Potential d/c after AM labs  Heme: SCDs while in bed  ID: Afebrile, No signs of infection. HIV+, viral load undetectable. Continue Biktarvy.  FEN: LR@75, Replete electrolytes PRN.   Dispo: continue routine post-operative care    Demetrice Vieyra, PGY1 66yo POD #1 s/p robotic assisted sacral colpopexy and cystoscopy. Pt doing well post-operatively. POD0 H/H dropped from 37->30, however no signs or symptoms of bleeding, low concern at this time. Will follow up AM labs.    Neuro: C/w Tylenol and Oxy PRN. Holding NSAIDs in setting of H/H drop.   CV:  F/u AM CBC. Continue Amlodipine.  Pulm: O2 sat WNL on RA, Increase ambulation, encourage incentive spirometry use. Continue Prednisone 30mg for one day post-op for severe asthma. Continue Albuterol, Flonase, Fasenra, Asmanex.  GI: Pt to attempt regular diet this AM. Continue Protonix  : perez in place. TOV after pending AM labs.  Heme: SCDs while in bed  ID: Afebrile, No signs of infection. HIV+, viral load undetectable. Continue Biktarvy.  FEN: LR@75, f/u BMP. Replete electrolytes PRN.   Dispo: continue routine post-operative care    Demetrice Vieyra, PGY1  RFrankel PGY4 68yo POD #1 s/p robotic assisted sacral colpopexy, GIO, and cystoscopy. Pt doing well post-operatively. POD0 H/H dropped from 37->30, however no signs or symptoms of bleeding, low concern at this time. Will follow up AM labs.    Neuro: C/w Tylenol and Oxy PRN. Holding NSAIDs in setting of H/H drop.   CV:  F/u AM CBC. Continue Amlodipine.  Pulm: O2 sat WNL on RA, Increase ambulation, encourage incentive spirometry use. Continue Prednisone 30mg for one day post-op for severe asthma. Continue Albuterol, Flonase, Fasenra, Asmanex.  GI: Pt to attempt regular diet this AM. Continue Protonix  : perez in place. TOV after pending AM labs.  Heme: SCDs while in bed  ID: Afebrile, No signs of infection. HIV+, viral load undetectable. Continue Biktarvy.  FEN: LR@75, f/u BMP. Replete electrolytes PRN.   Dispo: continue routine post-operative care    Demetrice Vieyra, PGY1  RFrankel PGY4    -------------  Urogyn Fellow Addendum    Patient seen and examined.  Patient denies any pain this morning.  She tolerated PO intake overnight.  Got up last night to stand but has not yet ambulated-no dizziness.  Passing flatus but no BM yet.  No SOB, wheezing.  Patient took prednisone 30 mg at 6 am this morning.    T(C): 36.7 (05-19-23 @ 06:02), Max: 37.4 (05-18-23 @ 20:44)  HR: 77 (05-19-23 @ 06:02) (68 - 90)  BP: 100/61 (05-19-23 @ 06:02) (100/61 - 157/89)  RR: 18 (05-19-23 @ 06:02) (14 - 18)  SpO2: 94% (05-19-23 @ 06:02) (94% - 100%)    I&O's Detail    18 May 2023 07:01  -  19 May 2023 07:00  --------------------------------------------------------  IN:    Lactated Ringers: 375 mL  Total IN: 375 mL    OUT:    Indwelling Catheter - Urethral (mL): 1170 mL  Total OUT: 1170 mL    Total NET: -795 mL      Physical Exam  Gen: NAD  HEENT: NCAT, sclera anicteric  Resp: non-labored  Abd: soft, non-distended, appropriately tender; incisions clean, dry, intact  : perez draining clear urine                          8.9    11.12 )-----------( 309      ( 19 May 2023 06:42 )             28.0       05-19    142  |  106  |  13  ----------------------------<  119<H>  4.1   |  23  |  0.96    Ca    8.8      19 May 2023 06:42          67F w/ severe asthma POD#1 s/p RA SCP, GIO, cystoscopy, doing well.  AVSS.  UOP adequate.  Postop Hct 37.7 --> 30.8 --> 28 without any clinical symptoms of signs of acute blood loss or anemia.    -Will repeat H/H at 9 am  -monitor vitals/UOP  -continue pain regimen  -regular diet  -dc IV fluids  -oob/ambulation  -dvt prophylaxis  -void trial this am if H/H stable  -written postop instructions reviewed and handed to patient  -dispo: home    Alyx Rouse MD  Urogynecology Fellow, PGY-6

## 2023-05-19 NOTE — PROGRESS NOTE ADULT - SUBJECTIVE AND OBJECTIVE BOX
68yo POD #1 s/p robotic assisted sacral colpopexy and cystoscopy. Pt seen and examined at bedside. No overnight events.  Pt without complaints. Pain well controlled on current regimen.   She denies SOB/CP/palpitations, fever/chills, nausea/emesis. Tolerating clears.  -OOB,  passing flatus, perez in place.    MEDICATIONS  (STANDING):  acetaminophen     Tablet .. 1000 milliGRAM(s) Oral every 6 hours  amLODIPine   Tablet 5 milliGRAM(s) Oral daily  bictegravir 50 mG/emtricitabine 200 mG/tenofovir alafenamide 25 mG (BIKTARVY) 1 Tablet(s) Oral daily  chlorhexidine 2% Cloths 1 Application(s) Topical once  fluticasone propionate 50 MICROgram(s)/spray Nasal Spray 2 Spray(s) Both Nostrils every 12 hours  lactated ringers. 1000 milliLiter(s) (75 mL/Hr) IV Continuous <Continuous>  mometasone 220 MICROgram(s) Inhaler 1 Puff(s) Inhalation daily  ondansetron Injectable 4 milliGRAM(s) IV Push every 6 hours  pantoprazole    Tablet 40 milliGRAM(s) Oral before breakfast    MEDICATIONS  (PRN):  acetaminophen     Tablet .. 1000 milliGRAM(s) Oral every 6 hours PRN Mild Pain (1 - 3)  albuterol    90 MICROgram(s) HFA Inhaler 2 Puff(s) Inhalation every 6 hours PRN asthma  oxyCODONE    IR 10 milliGRAM(s) Oral every 4 hours PRN Severe Pain (7 - 10)  oxyCODONE    IR 5 milliGRAM(s) Oral every 3 hours PRN Moderate Pain (4 - 6)  polyethylene glycol 3350 17 Gram(s) Oral at bedtime PRN Constipation  senna 1 Tablet(s) Oral at bedtime PRN Constipation  simethicone 80 milliGRAM(s) Chew every 6 hours PRN Gas        Vital Signs Last 24 Hrs  T(C): 36.7 (19 May 2023 06:02), Max: 37.4 (18 May 2023 20:44)  T(F): 98.1 (19 May 2023 06:02), Max: 99.3 (18 May 2023 20:44)  HR: 77 (19 May 2023 06:02) (68 - 90)  BP: 100/61 (19 May 2023 06:02) (100/61 - 157/89)  BP(mean): 84 (18 May 2023 20:44) (76 - 95)  RR: 18 (19 May 2023 06:02) (14 - 18)  SpO2: 94% (19 May 2023 06:02) (94% - 100%)    Parameters below as of 19 May 2023 06:02  Patient On (Oxygen Delivery Method): room air        05-18 @ 07:01  -  05-19 @ 06:31  --------------------------------------------------------  IN: 375 mL / OUT: 770 mL / NET: -395 mL        PHYSICAL EXAM:  Gen: NAD, A+O x 3  CV: RRR  Pulm: CTA BL  Abd: Soft, appropriately tender,  mildly distended, no rebound or guarding, +BS  Incision: 5 robotic incisions covered in op-sites.   : Perez catheter in place draining pale yellow urine  Extremities: No swelling or calf tenderness, SCDs in place    Labs, additional tests:             10.2   x     )-----------( x        ( 05-18 @ 19:56 )             30.5                Pt seen and examined at bedside. No overnight events.  Pt without complaints. Pain well controlled on current regimen.   She denies SOB/CP/palpitations, fever/chills, nausea/emesis. Tolerating clears.  -OOB,  passing flatus, perez in place.    MEDICATIONS  (STANDING):  acetaminophen     Tablet .. 1000 milliGRAM(s) Oral every 6 hours  amLODIPine   Tablet 5 milliGRAM(s) Oral daily  bictegravir 50 mG/emtricitabine 200 mG/tenofovir alafenamide 25 mG (BIKTARVY) 1 Tablet(s) Oral daily  chlorhexidine 2% Cloths 1 Application(s) Topical once  fluticasone propionate 50 MICROgram(s)/spray Nasal Spray 2 Spray(s) Both Nostrils every 12 hours  lactated ringers. 1000 milliLiter(s) (75 mL/Hr) IV Continuous <Continuous>  mometasone 220 MICROgram(s) Inhaler 1 Puff(s) Inhalation daily  ondansetron Injectable 4 milliGRAM(s) IV Push every 6 hours  pantoprazole    Tablet 40 milliGRAM(s) Oral before breakfast    MEDICATIONS  (PRN):  acetaminophen     Tablet .. 1000 milliGRAM(s) Oral every 6 hours PRN Mild Pain (1 - 3)  albuterol    90 MICROgram(s) HFA Inhaler 2 Puff(s) Inhalation every 6 hours PRN asthma  oxyCODONE    IR 10 milliGRAM(s) Oral every 4 hours PRN Severe Pain (7 - 10)  oxyCODONE    IR 5 milliGRAM(s) Oral every 3 hours PRN Moderate Pain (4 - 6)  polyethylene glycol 3350 17 Gram(s) Oral at bedtime PRN Constipation  senna 1 Tablet(s) Oral at bedtime PRN Constipation  simethicone 80 milliGRAM(s) Chew every 6 hours PRN Gas        Vital Signs Last 24 Hrs  T(C): 36.7 (19 May 2023 06:02), Max: 37.4 (18 May 2023 20:44)  T(F): 98.1 (19 May 2023 06:02), Max: 99.3 (18 May 2023 20:44)  HR: 77 (19 May 2023 06:02) (68 - 90)  BP: 100/61 (19 May 2023 06:02) (100/61 - 157/89)  BP(mean): 84 (18 May 2023 20:44) (76 - 95)  RR: 18 (19 May 2023 06:02) (14 - 18)  SpO2: 94% (19 May 2023 06:02) (94% - 100%)    Parameters below as of 19 May 2023 06:02  Patient On (Oxygen Delivery Method): room air        05-18 @ 07:01  -  05-19 @ 06:31  --------------------------------------------------------  IN: 375 mL / OUT: 770 mL / NET: -395 mL        PHYSICAL EXAM:  Gen: NAD, A+O x 3  CV: RRR  Pulm: CTA BL  Abd: Soft, appropriately tender,  mildly distended, no rebound or guarding, +BS  Incision: 5 robotic incisions covered in op-sites, c/d/i  : Perez catheter in place draining pale yellow urine  Extremities: No swelling or calf tenderness, SCDs in place    Labs, additional tests:             10.2   x     )-----------( x        ( 05-18 @ 19:56 )             30.5

## 2023-05-25 ENCOUNTER — NON-APPOINTMENT (OUTPATIENT)
Age: 68
End: 2023-05-25

## 2023-05-30 ENCOUNTER — APPOINTMENT (OUTPATIENT)
Dept: UROGYNECOLOGY | Facility: CLINIC | Age: 68
End: 2023-05-30
Payer: MEDICARE

## 2023-05-30 VITALS
DIASTOLIC BLOOD PRESSURE: 85 MMHG | SYSTOLIC BLOOD PRESSURE: 144 MMHG | BODY MASS INDEX: 28.49 KG/M2 | HEIGHT: 65 IN | WEIGHT: 171 LBS

## 2023-05-30 PROCEDURE — 99024 POSTOP FOLLOW-UP VISIT: CPT

## 2023-05-30 NOTE — DISCUSSION/SUMMARY
[Post-Op instructions given. Pt/family verbalizes understanding] : post-operative instructions were provided to the patient/family who verbalize understanding [Risks/Benefits discussed. Pt/family verbalizes understanding] : risks and benefits of the procedure were discussed with the patient/family who verbalize understanding [FreeTextEntry1] : \kendy Galicia is a 68 yo s/p RASCP/GIO/cystoscopy on 5/18/23. Pt doing well after surgery, without issues. RTO in 4 weeks or sooner if issues arise. All questions answered. Pt instructed to call office if any further questions/concerns arise.

## 2023-05-30 NOTE — OBJECTIVE
[Soft and Nontender] : soft and nontender [Clean, Dry, Intact] : Clean, Dry, Intact [Good Support] : Good support [Healing well] : healing well [No Masses or Tenderness] : no masses or tenderness [Post Void Residual ____ ml] : Post Void Residual was [unfilled] ml [FreeTextEntry3] : No mesh exposure, mesh erosion

## 2023-05-30 NOTE — SUBJECTIVE
[FreeTextEntry7] : Denies issues with pain.  [FreeTextEntry6] : Good appetite.  [FreeTextEntry5] : Denies urinary incontinence, urinary frequency, urgency, dysuria.  [FreeTextEntry4] : BM regular; daily.  [FreeTextEntry3] : Ambulating without difficulty.  [FreeTextEntry2] : Sleeping without difficulty.

## 2023-06-01 ENCOUNTER — APPOINTMENT (OUTPATIENT)
Dept: PULMONOLOGY | Facility: CLINIC | Age: 68
End: 2023-06-01
Payer: MEDICARE

## 2023-06-01 VITALS
BODY MASS INDEX: 27.99 KG/M2 | RESPIRATION RATE: 14 BRPM | HEIGHT: 65 IN | SYSTOLIC BLOOD PRESSURE: 121 MMHG | DIASTOLIC BLOOD PRESSURE: 85 MMHG | OXYGEN SATURATION: 96 % | WEIGHT: 168 LBS | HEART RATE: 101 BPM | TEMPERATURE: 97.8 F

## 2023-06-01 PROBLEM — C50.911 MALIGNANT NEOPLASM OF UNSPECIFIED SITE OF RIGHT FEMALE BREAST: Chronic | Status: ACTIVE | Noted: 2023-04-28

## 2023-06-01 PROBLEM — I10 ESSENTIAL (PRIMARY) HYPERTENSION: Chronic | Status: ACTIVE | Noted: 2023-04-28

## 2023-06-01 PROCEDURE — 96372 THER/PROPH/DIAG INJ SC/IM: CPT

## 2023-06-01 RX ORDER — BENRALIZUMAB 30 MG/ML
30 INJECTION, SOLUTION SUBCUTANEOUS
Refills: 0 | Status: COMPLETED | OUTPATIENT
Start: 2023-06-01

## 2023-06-01 RX ADMIN — BENRALIZUMAB 0 MG/ML: 30 INJECTION, SOLUTION SUBCUTANEOUS at 00:00

## 2023-06-01 NOTE — PHYSICAL EXAM
[No Acute Distress] : no acute distress [Normal Appearance] : normal appearance [Normal Rate/Rhythm] : normal rate/rhythm [Normal S1, S2] : normal s1, s2 [No Resp Distress] : no resp distress [Clear to Auscultation Bilaterally] : clear to auscultation bilaterally [No Clubbing] : no clubbing [No Edema] : no edema [Normal Color/ Pigmentation] : normal color/ pigmentation [No Focal Deficits] : no focal deficits [Oriented x3] : oriented x3 [Normal Affect] : normal affect [TextBox_89] : lap incisions x5, c/d/i with gauze and steri-strips

## 2023-06-01 NOTE — ASSESSMENT
[FreeTextEntry1] : 1.) Asthma, 2nd dose of Fasenra today \par - Continue Fasenra q month x1 more dose, then every 8 weeks thereafter \par - Continue Fluticasone Salmeterol daily, albuterol prn\par - Follow up with Dr. Aguirre in July, will plan to do Fasenra injection at same visit

## 2023-06-01 NOTE — HISTORY OF PRESENT ILLNESS
[TextBox_4] : 67-year-old female who was severe or persistent eosinophilic asthma currently now off prednisone, here for 2nd Fasenra injection.  \par \par Doing well today, denies SOB, wheezing, chest pain, fever, chills. Had gyn sx, all went well. Abdominal lap sites x3 c/d/i with gauze and steri strips. Is taking ICS/LABA inhaler daily, albuterol prn. Lungs CTA.

## 2023-06-01 NOTE — PROCEDURE
[FreeTextEntry1] : RN administered Fasenra 30 mg autoinj to left upper arm  \par Lot # [id number]\par Exp: 10/2025\par NDC # 9080-1637-29\par \par Pt tolerated injection well. Monitored for 30 min post injection. No s/s adverse reaction. Epi on hand. TRC in 4 weeks for 3rd injection.

## 2023-06-20 ENCOUNTER — APPOINTMENT (OUTPATIENT)
Dept: UROGYNECOLOGY | Facility: CLINIC | Age: 68
End: 2023-06-20
Payer: MEDICARE

## 2023-06-20 DIAGNOSIS — N81.11 CYSTOCELE, MIDLINE: ICD-10-CM

## 2023-06-20 DIAGNOSIS — N99.3 PROLAPSE OF VAGINAL VAULT AFTER HYSTERECTOMY: ICD-10-CM

## 2023-06-20 PROCEDURE — 99024 POSTOP FOLLOW-UP VISIT: CPT

## 2023-06-20 NOTE — REASON FOR VISIT
06/30/19 0202 Dual Skin Pressure Injury Assessment Dual Skin Pressure Injury Assessment X Second Care Provider (Based on [address] [place] [address]) Seferino Santos RN Skin Integumentary Skin Integumentary (WDL) X 
(BLE redness, ) Skin Color Appropriate for ethnicity Skin Condition/Temp Cold Skin Integrity Abrasion;Cracked; Lesion (comment); Scars (comment) 
(toes, BLE, abd/chest  incision from openn heart surg) Turgor Epidermis thin w/ loss of subcut tissue Hair Growth Present Varicosities Absent Wound Prevention and Protection Methods Orientation of Wound Prevention Posterior Location of Wound Prevention Sacrum/Coccyx 
(redness. Barried cream on) Dressing Present  Yes (allevyn) Wound Offloading (Prevention Methods) Bed, pressure reduction mattress Primary Nurse Howard Parker and CAMILO RN performed a dual skin assessment on this patient Impairment noted- see wound doc flow sheet Devin score is 13 [Follow-up Visit ___] : a follow-up visit  for [unfilled]

## 2023-06-22 NOTE — DISCUSSION/SUMMARY
[Post-Op instructions given. Pt/family verbalizes understanding] : post-operative instructions were provided to the patient/family who verbalize understanding [Risks/Benefits discussed. Pt/family verbalizes understanding] : risks and benefits of the procedure were discussed with the patient/family who verbalize understanding [FreeTextEntry1] : Grayson, 68 y/o s/p RA-SCP/GIO/cystoscopy on 5/18/23. Pt doing well after surgery, without issues.\par  \par RTO in 4 weeks or sooner if needed.  IF pt have any problems/concern to call office.  PT aware and agrees.

## 2023-06-22 NOTE — OBJECTIVE
[Soft and Nontender] : soft and nontender [Clean, Dry, Intact] : Clean, Dry, Intact [Good Support] : Good support [Healing well] : healing well [No Masses or Tenderness] : no masses or tenderness [FreeTextEntry3] : No mesh exposure, mesh erosion noted.

## 2023-06-22 NOTE — SUBJECTIVE
[FreeTextEntry1] : Doing well [FreeTextEntry8] : none [FreeTextEntry7] : Denies issues with pain or discomfort. [FreeTextEntry6] : Good  [FreeTextEntry5] : Denies urinary incontinence, urinary frequency, urgency, dysuria, or incomplete emptying of bladder. [FreeTextEntry4] : moving well [FreeTextEntry3] : normal and steady [FreeTextEntry2] : good

## 2023-07-06 ENCOUNTER — APPOINTMENT (OUTPATIENT)
Dept: PULMONOLOGY | Facility: CLINIC | Age: 68
End: 2023-07-06
Payer: MEDICARE

## 2023-07-06 VITALS
BODY MASS INDEX: 27.66 KG/M2 | RESPIRATION RATE: 15 BRPM | TEMPERATURE: 97 F | DIASTOLIC BLOOD PRESSURE: 81 MMHG | OXYGEN SATURATION: 91 % | SYSTOLIC BLOOD PRESSURE: 130 MMHG | HEART RATE: 100 BPM | WEIGHT: 166 LBS | HEIGHT: 65 IN

## 2023-07-06 DIAGNOSIS — J82.83 EOSINOPHILIC ASTHMA: ICD-10-CM

## 2023-07-06 PROCEDURE — 99213 OFFICE O/P EST LOW 20 MIN: CPT | Mod: 25

## 2023-07-06 PROCEDURE — 96372 THER/PROPH/DIAG INJ SC/IM: CPT

## 2023-07-06 RX ORDER — BENRALIZUMAB 30 MG/ML
30 INJECTION, SOLUTION SUBCUTANEOUS
Refills: 0 | Status: COMPLETED | OUTPATIENT
Start: 2023-07-06

## 2023-07-06 RX ADMIN — BENRALIZUMAB 0 MG/ML: 30 INJECTION, SOLUTION SUBCUTANEOUS at 00:00

## 2023-07-06 NOTE — ASSESSMENT
[FreeTextEntry1] : 66YO Female never smoker with hx of severe persistent eosinophilic asthma, currently off prednisone.\par She presents today for a 3rd Fasenra infection, started on 4/28/23. She is tolerating biologic well. Denies shortness of breath cough or wheezing. Only using LABA-ICS once a day and albuterol bid. \par \par S/p gyn surgery for uterine prolapse. \par \par #Eosinophilic Asthma\par - Continue Fasenra, every 8 weeks going forward. \par - Increase Advair 250 to BID\par - Albuterol prn\par \par Follow up in 8 weeks and will check CBC with diff then.

## 2023-07-06 NOTE — HISTORY OF PRESENT ILLNESS
[TextBox_4] : 67-year-old female who had severe persistent eosinophilic asthma, now off prednisone, here for 3rd Fasenra injection. \par She is on Advair 250, only once a day and albuterol bid. Denies shortness of breath or wheezing. no cough or sputum. No recent illness. \par Doing well. Tolerating Biologics well. \par  \par

## 2023-07-28 ENCOUNTER — APPOINTMENT (OUTPATIENT)
Dept: UROGYNECOLOGY | Facility: CLINIC | Age: 68
End: 2023-07-28
Payer: MEDICARE

## 2023-07-28 VITALS — DIASTOLIC BLOOD PRESSURE: 76 MMHG | RESPIRATION RATE: 16 BRPM | HEART RATE: 84 BPM | SYSTOLIC BLOOD PRESSURE: 128 MMHG

## 2023-07-28 PROCEDURE — 99024 POSTOP FOLLOW-UP VISIT: CPT

## 2023-07-28 NOTE — OBJECTIVE
[Soft and Nontender] : soft and nontender [Clean, Dry, Intact] : Clean, Dry, Intact [Good Support] : Good support [Healing well] : healing well [No Masses or Tenderness] : no masses or tenderness [FreeTextEntry3] : No mesh exposure, mesh erosion noted.  Dr. Keith examined pt.  Rectocele noted.

## 2023-07-28 NOTE — SUBJECTIVE
[FreeTextEntry1] : Doing well.  Feels a bulge at the vaginal area. [FreeTextEntry8] : none [FreeTextEntry7] : Denies issues with pain or discomfort. [FreeTextEntry6] : Good  [FreeTextEntry5] : Denies urinary incontinence, urinary frequency, urgency, dysuria, or incomplete emptying of bladder. [FreeTextEntry4] : Constipated and she is taking Miralax to help with BM.   [FreeTextEntry3] : normal and steady [FreeTextEntry2] : good

## 2023-07-28 NOTE — DISCUSSION/SUMMARY
[Post-Op instructions given. Pt/family verbalizes understanding] : post-operative instructions were provided to the patient/family who verbalize understanding [Risks/Benefits discussed. Pt/family verbalizes understanding] : risks and benefits of the procedure were discussed with the patient/family who verbalize understanding [FreeTextEntry1] : Grayson, 68 y/o s/p RA-SCP/GIO/cystoscopy on 5/18/23. Pt doing well after surgery.\par \par Rectocele:\par -PT will go for Pelvic floor therapy.  Referral provided to pt.\par -Refrain from constipation.\par -If symptoms persist, pt to follow up sooner.\par  \par RTO in 4 months or sooner if needed.  IF pt have any problems/concern to call office.  PT aware and agrees.

## 2023-09-02 ENCOUNTER — EMERGENCY (EMERGENCY)
Facility: HOSPITAL | Age: 68
LOS: 1 days | Discharge: HOME | End: 2023-09-02
Attending: STUDENT IN AN ORGANIZED HEALTH CARE EDUCATION/TRAINING PROGRAM | Admitting: STUDENT IN AN ORGANIZED HEALTH CARE EDUCATION/TRAINING PROGRAM
Payer: MEDICARE

## 2023-09-02 VITALS
RESPIRATION RATE: 18 BRPM | TEMPERATURE: 99 F | OXYGEN SATURATION: 99 % | HEART RATE: 95 BPM | DIASTOLIC BLOOD PRESSURE: 91 MMHG | SYSTOLIC BLOOD PRESSURE: 132 MMHG

## 2023-09-02 DIAGNOSIS — Z98.890 OTHER SPECIFIED POSTPROCEDURAL STATES: Chronic | ICD-10-CM

## 2023-09-02 DIAGNOSIS — Z98.89 OTHER SPECIFIED POSTPROCEDURAL STATES: Chronic | ICD-10-CM

## 2023-09-02 DIAGNOSIS — Z90.710 ACQUIRED ABSENCE OF BOTH CERVIX AND UTERUS: Chronic | ICD-10-CM

## 2023-09-02 LAB
ALBUMIN SERPL ELPH-MCNC: 4.7 G/DL — SIGNIFICANT CHANGE UP (ref 3.3–5)
ALP SERPL-CCNC: 91 U/L — SIGNIFICANT CHANGE UP (ref 40–120)
ALT FLD-CCNC: 16 U/L — SIGNIFICANT CHANGE UP (ref 4–33)
ANION GAP SERPL CALC-SCNC: 11 MMOL/L — SIGNIFICANT CHANGE UP (ref 7–14)
AST SERPL-CCNC: 27 U/L — SIGNIFICANT CHANGE UP (ref 4–32)
B PERT DNA SPEC QL NAA+PROBE: SIGNIFICANT CHANGE UP
B PERT+PARAPERT DNA PNL SPEC NAA+PROBE: SIGNIFICANT CHANGE UP
BASOPHILS # BLD AUTO: 0.02 K/UL — SIGNIFICANT CHANGE UP (ref 0–0.2)
BASOPHILS NFR BLD AUTO: 0.2 % — SIGNIFICANT CHANGE UP (ref 0–2)
BILIRUB SERPL-MCNC: 1.5 MG/DL — HIGH (ref 0.2–1.2)
BORDETELLA PARAPERTUSSIS (RAPRVP): SIGNIFICANT CHANGE UP
BUN SERPL-MCNC: 15 MG/DL — SIGNIFICANT CHANGE UP (ref 7–23)
C PNEUM DNA SPEC QL NAA+PROBE: SIGNIFICANT CHANGE UP
CALCIUM SERPL-MCNC: 9.4 MG/DL — SIGNIFICANT CHANGE UP (ref 8.4–10.5)
CHLORIDE SERPL-SCNC: 102 MMOL/L — SIGNIFICANT CHANGE UP (ref 98–107)
CO2 SERPL-SCNC: 27 MMOL/L — SIGNIFICANT CHANGE UP (ref 22–31)
CREAT SERPL-MCNC: 0.92 MG/DL — SIGNIFICANT CHANGE UP (ref 0.5–1.3)
EGFR: 69 ML/MIN/1.73M2 — SIGNIFICANT CHANGE UP
EOSINOPHIL # BLD AUTO: 0 K/UL — SIGNIFICANT CHANGE UP (ref 0–0.5)
EOSINOPHIL NFR BLD AUTO: 0 % — SIGNIFICANT CHANGE UP (ref 0–6)
FLUAV SUBTYP SPEC NAA+PROBE: SIGNIFICANT CHANGE UP
FLUBV RNA SPEC QL NAA+PROBE: SIGNIFICANT CHANGE UP
GLUCOSE SERPL-MCNC: 98 MG/DL — SIGNIFICANT CHANGE UP (ref 70–99)
HADV DNA SPEC QL NAA+PROBE: SIGNIFICANT CHANGE UP
HCOV 229E RNA SPEC QL NAA+PROBE: SIGNIFICANT CHANGE UP
HCOV HKU1 RNA SPEC QL NAA+PROBE: SIGNIFICANT CHANGE UP
HCOV NL63 RNA SPEC QL NAA+PROBE: SIGNIFICANT CHANGE UP
HCOV OC43 RNA SPEC QL NAA+PROBE: SIGNIFICANT CHANGE UP
HCT VFR BLD CALC: 33.2 % — LOW (ref 34.5–45)
HGB BLD-MCNC: 10.9 G/DL — LOW (ref 11.5–15.5)
HMPV RNA SPEC QL NAA+PROBE: SIGNIFICANT CHANGE UP
HPIV1 RNA SPEC QL NAA+PROBE: SIGNIFICANT CHANGE UP
HPIV2 RNA SPEC QL NAA+PROBE: SIGNIFICANT CHANGE UP
HPIV3 RNA SPEC QL NAA+PROBE: SIGNIFICANT CHANGE UP
HPIV4 RNA SPEC QL NAA+PROBE: SIGNIFICANT CHANGE UP
IANC: 5.95 K/UL — SIGNIFICANT CHANGE UP (ref 1.8–7.4)
IMM GRANULOCYTES NFR BLD AUTO: 0.4 % — SIGNIFICANT CHANGE UP (ref 0–0.9)
LYMPHOCYTES # BLD AUTO: 1.05 K/UL — SIGNIFICANT CHANGE UP (ref 1–3.3)
LYMPHOCYTES # BLD AUTO: 12.5 % — LOW (ref 13–44)
M PNEUMO DNA SPEC QL NAA+PROBE: SIGNIFICANT CHANGE UP
MCHC RBC-ENTMCNC: 32.4 PG — SIGNIFICANT CHANGE UP (ref 27–34)
MCHC RBC-ENTMCNC: 32.8 GM/DL — SIGNIFICANT CHANGE UP (ref 32–36)
MCV RBC AUTO: 98.8 FL — SIGNIFICANT CHANGE UP (ref 80–100)
MONOCYTES # BLD AUTO: 1.32 K/UL — HIGH (ref 0–0.9)
MONOCYTES NFR BLD AUTO: 15.8 % — HIGH (ref 2–14)
NEUTROPHILS # BLD AUTO: 5.95 K/UL — SIGNIFICANT CHANGE UP (ref 1.8–7.4)
NEUTROPHILS NFR BLD AUTO: 71.1 % — SIGNIFICANT CHANGE UP (ref 43–77)
NRBC # BLD: 0 /100 WBCS — SIGNIFICANT CHANGE UP (ref 0–0)
NRBC # FLD: 0 K/UL — SIGNIFICANT CHANGE UP (ref 0–0)
PLATELET # BLD AUTO: 302 K/UL — SIGNIFICANT CHANGE UP (ref 150–400)
POTASSIUM SERPL-MCNC: 3.8 MMOL/L — SIGNIFICANT CHANGE UP (ref 3.5–5.3)
POTASSIUM SERPL-SCNC: 3.8 MMOL/L — SIGNIFICANT CHANGE UP (ref 3.5–5.3)
PROT SERPL-MCNC: 8 G/DL — SIGNIFICANT CHANGE UP (ref 6–8.3)
RAPID RVP RESULT: DETECTED
RBC # BLD: 3.36 M/UL — LOW (ref 3.8–5.2)
RBC # FLD: 15.6 % — HIGH (ref 10.3–14.5)
RSV RNA SPEC QL NAA+PROBE: SIGNIFICANT CHANGE UP
RV+EV RNA SPEC QL NAA+PROBE: SIGNIFICANT CHANGE UP
SARS-COV-2 RNA SPEC QL NAA+PROBE: DETECTED
SODIUM SERPL-SCNC: 140 MMOL/L — SIGNIFICANT CHANGE UP (ref 135–145)
TROPONIN T, HIGH SENSITIVITY RESULT: 10 NG/L — SIGNIFICANT CHANGE UP
TROPONIN T, HIGH SENSITIVITY RESULT: 9 NG/L — SIGNIFICANT CHANGE UP
WBC # BLD: 8.37 K/UL — SIGNIFICANT CHANGE UP (ref 3.8–10.5)
WBC # FLD AUTO: 8.37 K/UL — SIGNIFICANT CHANGE UP (ref 3.8–10.5)

## 2023-09-02 PROCEDURE — 71046 X-RAY EXAM CHEST 2 VIEWS: CPT | Mod: 26

## 2023-09-02 PROCEDURE — 93010 ELECTROCARDIOGRAM REPORT: CPT

## 2023-09-02 PROCEDURE — 99285 EMERGENCY DEPT VISIT HI MDM: CPT

## 2023-09-02 RX ORDER — ACETAMINOPHEN 500 MG
975 TABLET ORAL ONCE
Refills: 0 | Status: COMPLETED | OUTPATIENT
Start: 2023-09-02 | End: 2023-09-02

## 2023-09-02 RX ORDER — IPRATROPIUM/ALBUTEROL SULFATE 18-103MCG
3 AEROSOL WITH ADAPTER (GRAM) INHALATION ONCE
Refills: 0 | Status: COMPLETED | OUTPATIENT
Start: 2023-09-02 | End: 2023-09-02

## 2023-09-02 RX ADMIN — Medication 3 MILLILITER(S): at 08:55

## 2023-09-02 RX ADMIN — Medication 975 MILLIGRAM(S): at 08:55

## 2023-09-02 RX ADMIN — Medication 975 MILLIGRAM(S): at 09:25

## 2023-09-02 NOTE — ED PROVIDER NOTE - CLINICAL SUMMARY MEDICAL DECISION MAKING FREE TEXT BOX
66 yo F hx breast cancer in remission since 2016, HTN, HLD, asthma, presenting with complaints of fever, cough, sore throat for the past few days in setting of known sick contact who tested positive for COVID. Pt took a home test yesterday which was negative. Pt also reports chest pain today that feels like congestion in her chest. No pleuritic chest pain, no shortness of breath, no exertional symptoms. Denies history of intubation or being hospitalized for asthma in the past. Covid vaccinated. Pt is well appearing on exam, no respiratory distress but with intermittent expiatory wheeze, non-toxic appearing. Suspect early viral URI, doubt ACS, symptoms not consistent with PE. Plan for xr, RVP, labs including troponin, EKG, duoneb and likely dc.

## 2023-09-02 NOTE — ED ADULT NURSE NOTE - OBJECTIVE STATEMENT
Received patient in Intake 1 c/o fever, sore throat, cough for few days, patient denies SOB, chest pain. Patient is A&OX4, airway patent, breathing unlabored and even. Pending lab results, medications given as ordered. Side rails up and safety maintained. Call bells within reach.

## 2023-09-02 NOTE — ED PROVIDER NOTE - PATIENT PORTAL LINK FT
You can access the FollowMyHealth Patient Portal offered by [facility] by registering at the following website: [email or web address]. By joining [employer]’s FollowMyHealth portal, you will also be able to view your health information using other applications (apps) compatible with our system.

## 2023-09-02 NOTE — ED ADULT TRIAGE NOTE - CHIEF COMPLAINT QUOTE
Pt is c/o a dry cough with itching in her throat since Thursday. Has no fever or SOB or weakness. Had contact with someone who is covid positive. Pt says as she is asthmatic she wants to be tested for covid.

## 2023-09-02 NOTE — ED PROVIDER NOTE - PHYSICAL EXAMINATION
VITALS: reviewed  GEN: NAD, A & O x 4  HEAD/EYES: NCAT, EOMI, anicteric sclerae, b/l tonsilar erythema without exudates. Neck supple, no cervical lymphadenopathy   ENT: mucus membranes moist, oropharynx WNL, trachea midline,  RESP: lungs with mild/intermittent expiratory wheeze, with equal breath sounds bilaterally, chest wall nontender and atraumatic  CV: heart with reg rhythm S1, S2, distal pulses intact and symmetric bilaterally  ABDOMEN: normoactive bowel sounds, soft, nondistended, nontender, no palpable masses  : no CVAT  MSK: extremities atraumatic and nontender, no edema, no asymmetry.  SKIN: warm, dry, no rash, no bruising, no cyanosis. color appropriate for ethnicity  NEURO: alert, mentating appropriately, no facial asymmetry.   PSYCH: Affect appropriate

## 2023-09-02 NOTE — ED ADULT NURSE NOTE - NSFALLUNIVINTERV_ED_ALL_ED
Bed/Stretcher in lowest position, wheels locked, appropriate side rails in place/Call bell, personal items and telephone in reach/Instruct patient to call for assistance before getting out of bed/chair/stretcher/Non-slip footwear applied when patient is off stretcher/[place] to call system/Physically safe environment - no spills, clutter or unnecessary equipment/Purposeful proactive rounding/Room/bathroom lighting operational, light cord in reach

## 2023-09-02 NOTE — ED PROVIDER NOTE - NSFOLLOWUPINSTRUCTIONS_ED_ALL_ED_FT
If you test positive for COVID-19, stay home for at least 5 days and isolate from others in your home.    You are likely most infectious during these first 5 days.    Wear a high-quality mask if you must be around others at home and in public.  Do not go places where you are unable to wear a mask. For travel guidance, see [facility]’s Travel web page.  Do not travel.  Stay home and separate from others as much as possible.  Use a separate bathroom, if possible.  Take steps to improve ventilation at home, if possible.  Don’t share personal household items, like cups, towels, and utensils.  Monitor your symptoms. If you have an emergency warning sign (like trouble breathing), seek emergency medical care immediately.

## 2023-09-08 ENCOUNTER — APPOINTMENT (OUTPATIENT)
Dept: PULMONOLOGY | Facility: CLINIC | Age: 68
End: 2023-09-08

## 2023-09-15 ENCOUNTER — APPOINTMENT (OUTPATIENT)
Dept: PULMONOLOGY | Facility: CLINIC | Age: 68
End: 2023-09-15
Payer: MEDICARE

## 2023-09-15 VITALS
HEIGHT: 65 IN | RESPIRATION RATE: 16 BRPM | HEART RATE: 84 BPM | DIASTOLIC BLOOD PRESSURE: 89 MMHG | WEIGHT: 166 LBS | SYSTOLIC BLOOD PRESSURE: 152 MMHG | BODY MASS INDEX: 27.66 KG/M2 | OXYGEN SATURATION: 97 %

## 2023-09-15 PROCEDURE — 96372 THER/PROPH/DIAG INJ SC/IM: CPT

## 2023-09-15 RX ADMIN — BENRALIZUMAB 0 MG/ML: 30 INJECTION, SOLUTION SUBCUTANEOUS at 00:00

## 2023-11-03 ENCOUNTER — NON-APPOINTMENT (OUTPATIENT)
Age: 68
End: 2023-11-03

## 2023-11-21 ENCOUNTER — APPOINTMENT (OUTPATIENT)
Dept: UROGYNECOLOGY | Facility: CLINIC | Age: 68
End: 2023-11-21
Payer: MEDICARE

## 2023-11-21 VITALS
HEIGHT: 65 IN | DIASTOLIC BLOOD PRESSURE: 86 MMHG | BODY MASS INDEX: 26.82 KG/M2 | SYSTOLIC BLOOD PRESSURE: 144 MMHG | WEIGHT: 161 LBS | HEART RATE: 84 BPM

## 2023-11-21 PROCEDURE — 99213 OFFICE O/P EST LOW 20 MIN: CPT

## 2023-12-08 ENCOUNTER — OUTPATIENT (OUTPATIENT)
Dept: OUTPATIENT SERVICES | Facility: HOSPITAL | Age: 68
LOS: 1 days | End: 2023-12-08
Payer: MEDICARE

## 2023-12-08 VITALS
TEMPERATURE: 98 F | HEART RATE: 90 BPM | SYSTOLIC BLOOD PRESSURE: 126 MMHG | OXYGEN SATURATION: 98 % | DIASTOLIC BLOOD PRESSURE: 85 MMHG | WEIGHT: 162.04 LBS | RESPIRATION RATE: 18 BRPM | HEIGHT: 65 IN

## 2023-12-08 DIAGNOSIS — Z98.890 OTHER SPECIFIED POSTPROCEDURAL STATES: Chronic | ICD-10-CM

## 2023-12-08 DIAGNOSIS — Z01.818 ENCOUNTER FOR OTHER PREPROCEDURAL EXAMINATION: ICD-10-CM

## 2023-12-08 DIAGNOSIS — N81.6 RECTOCELE: ICD-10-CM

## 2023-12-08 DIAGNOSIS — Z90.710 ACQUIRED ABSENCE OF BOTH CERVIX AND UTERUS: Chronic | ICD-10-CM

## 2023-12-08 DIAGNOSIS — Z98.89 OTHER SPECIFIED POSTPROCEDURAL STATES: Chronic | ICD-10-CM

## 2023-12-08 DIAGNOSIS — N99.3 PROLAPSE OF VAGINAL VAULT AFTER HYSTERECTOMY: ICD-10-CM

## 2023-12-08 DIAGNOSIS — J45.909 UNSPECIFIED ASTHMA, UNCOMPLICATED: ICD-10-CM

## 2023-12-08 LAB
ANION GAP SERPL CALC-SCNC: 15 MMOL/L — SIGNIFICANT CHANGE UP (ref 5–17)
ANION GAP SERPL CALC-SCNC: 15 MMOL/L — SIGNIFICANT CHANGE UP (ref 5–17)
BUN SERPL-MCNC: 17 MG/DL — SIGNIFICANT CHANGE UP (ref 7–23)
BUN SERPL-MCNC: 17 MG/DL — SIGNIFICANT CHANGE UP (ref 7–23)
CALCIUM SERPL-MCNC: 10.1 MG/DL — SIGNIFICANT CHANGE UP (ref 8.4–10.5)
CALCIUM SERPL-MCNC: 10.1 MG/DL — SIGNIFICANT CHANGE UP (ref 8.4–10.5)
CHLORIDE SERPL-SCNC: 101 MMOL/L — SIGNIFICANT CHANGE UP (ref 96–108)
CHLORIDE SERPL-SCNC: 101 MMOL/L — SIGNIFICANT CHANGE UP (ref 96–108)
CO2 SERPL-SCNC: 24 MMOL/L — SIGNIFICANT CHANGE UP (ref 22–31)
CO2 SERPL-SCNC: 24 MMOL/L — SIGNIFICANT CHANGE UP (ref 22–31)
CREAT SERPL-MCNC: 0.87 MG/DL — SIGNIFICANT CHANGE UP (ref 0.5–1.3)
CREAT SERPL-MCNC: 0.87 MG/DL — SIGNIFICANT CHANGE UP (ref 0.5–1.3)
EGFR: 73 ML/MIN/1.73M2 — SIGNIFICANT CHANGE UP
EGFR: 73 ML/MIN/1.73M2 — SIGNIFICANT CHANGE UP
GLUCOSE SERPL-MCNC: 78 MG/DL — SIGNIFICANT CHANGE UP (ref 70–99)
GLUCOSE SERPL-MCNC: 78 MG/DL — SIGNIFICANT CHANGE UP (ref 70–99)
HCT VFR BLD CALC: 35.2 % — SIGNIFICANT CHANGE UP (ref 34.5–45)
HCT VFR BLD CALC: 35.2 % — SIGNIFICANT CHANGE UP (ref 34.5–45)
HGB BLD-MCNC: 11.5 G/DL — SIGNIFICANT CHANGE UP (ref 11.5–15.5)
HGB BLD-MCNC: 11.5 G/DL — SIGNIFICANT CHANGE UP (ref 11.5–15.5)
MCHC RBC-ENTMCNC: 30.7 PG — SIGNIFICANT CHANGE UP (ref 27–34)
MCHC RBC-ENTMCNC: 30.7 PG — SIGNIFICANT CHANGE UP (ref 27–34)
MCHC RBC-ENTMCNC: 32.7 GM/DL — SIGNIFICANT CHANGE UP (ref 32–36)
MCHC RBC-ENTMCNC: 32.7 GM/DL — SIGNIFICANT CHANGE UP (ref 32–36)
MCV RBC AUTO: 93.9 FL — SIGNIFICANT CHANGE UP (ref 80–100)
MCV RBC AUTO: 93.9 FL — SIGNIFICANT CHANGE UP (ref 80–100)
NRBC # BLD: 0 /100 WBCS — SIGNIFICANT CHANGE UP (ref 0–0)
NRBC # BLD: 0 /100 WBCS — SIGNIFICANT CHANGE UP (ref 0–0)
PLATELET # BLD AUTO: 349 K/UL — SIGNIFICANT CHANGE UP (ref 150–400)
PLATELET # BLD AUTO: 349 K/UL — SIGNIFICANT CHANGE UP (ref 150–400)
POTASSIUM SERPL-MCNC: 3.8 MMOL/L — SIGNIFICANT CHANGE UP (ref 3.5–5.3)
POTASSIUM SERPL-MCNC: 3.8 MMOL/L — SIGNIFICANT CHANGE UP (ref 3.5–5.3)
POTASSIUM SERPL-SCNC: 3.8 MMOL/L — SIGNIFICANT CHANGE UP (ref 3.5–5.3)
POTASSIUM SERPL-SCNC: 3.8 MMOL/L — SIGNIFICANT CHANGE UP (ref 3.5–5.3)
RBC # BLD: 3.75 M/UL — LOW (ref 3.8–5.2)
RBC # BLD: 3.75 M/UL — LOW (ref 3.8–5.2)
RBC # FLD: 13.9 % — SIGNIFICANT CHANGE UP (ref 10.3–14.5)
RBC # FLD: 13.9 % — SIGNIFICANT CHANGE UP (ref 10.3–14.5)
SODIUM SERPL-SCNC: 140 MMOL/L — SIGNIFICANT CHANGE UP (ref 135–145)
SODIUM SERPL-SCNC: 140 MMOL/L — SIGNIFICANT CHANGE UP (ref 135–145)
WBC # BLD: 6.03 K/UL — SIGNIFICANT CHANGE UP (ref 3.8–10.5)
WBC # BLD: 6.03 K/UL — SIGNIFICANT CHANGE UP (ref 3.8–10.5)
WBC # FLD AUTO: 6.03 K/UL — SIGNIFICANT CHANGE UP (ref 3.8–10.5)
WBC # FLD AUTO: 6.03 K/UL — SIGNIFICANT CHANGE UP (ref 3.8–10.5)

## 2023-12-08 PROCEDURE — G0463: CPT

## 2023-12-08 PROCEDURE — 86901 BLOOD TYPING SEROLOGIC RH(D): CPT

## 2023-12-08 PROCEDURE — 86850 RBC ANTIBODY SCREEN: CPT

## 2023-12-08 PROCEDURE — 86900 BLOOD TYPING SEROLOGIC ABO: CPT

## 2023-12-08 RX ORDER — CEFOTETAN DISODIUM 1 G
2 VIAL (EA) INJECTION ONCE
Refills: 0 | Status: DISCONTINUED | OUTPATIENT
Start: 2023-12-28 | End: 2024-01-11

## 2023-12-08 RX ORDER — VITAMIN E 100 UNIT
1 CAPSULE ORAL
Refills: 0 | DISCHARGE

## 2023-12-08 RX ORDER — BENRALIZUMAB 30 MG/ML
30 INJECTION, SOLUTION SUBCUTANEOUS
Refills: 0 | DISCHARGE

## 2023-12-08 RX ORDER — BICTEGRAVIR SODIUM, EMTRICITABINE, AND TENOFOVIR ALAFENAMIDE FUMARATE 30; 120; 15 MG/1; MG/1; MG/1
1 TABLET ORAL
Refills: 0 | DISCHARGE

## 2023-12-08 RX ORDER — AMLODIPINE BESYLATE 2.5 MG/1
1 TABLET ORAL
Refills: 0 | DISCHARGE

## 2023-12-08 RX ORDER — OMEPRAZOLE 10 MG/1
1 CAPSULE, DELAYED RELEASE ORAL
Refills: 0 | DISCHARGE

## 2023-12-08 RX ORDER — ALBUTEROL 90 UG/1
2 AEROSOL, METERED ORAL
Refills: 0 | DISCHARGE

## 2023-12-08 RX ORDER — FLUTICASONE PROPIONATE 50 MCG
2 SPRAY, SUSPENSION NASAL
Refills: 0 | DISCHARGE

## 2023-12-08 RX ORDER — IBUPROFEN 200 MG
1 TABLET ORAL
Qty: 0 | Refills: 0 | DISCHARGE

## 2023-12-08 RX ORDER — FLUTICASONE FUROATE AND VILANTEROL TRIFENATATE 100; 25 UG/1; UG/1
1 POWDER RESPIRATORY (INHALATION)
Refills: 0 | DISCHARGE

## 2023-12-08 RX ORDER — ERGOCALCIFEROL 1.25 MG/1
1 CAPSULE ORAL
Refills: 0 | DISCHARGE

## 2023-12-08 RX ORDER — MELOXICAM 15 MG/1
1 TABLET ORAL
Refills: 0 | DISCHARGE

## 2023-12-08 RX ORDER — CALCIUM CARBONATE 500(1250)
1 TABLET ORAL
Refills: 0 | DISCHARGE

## 2023-12-08 RX ORDER — ROSUVASTATIN CALCIUM 5 MG/1
1 TABLET ORAL
Refills: 0 | DISCHARGE

## 2023-12-08 RX ORDER — ACETAMINOPHEN 500 MG
2 TABLET ORAL
Qty: 0 | Refills: 0 | DISCHARGE

## 2023-12-08 RX ORDER — FLUTICASONE PROPIONATE 220 MCG
1 AEROSOL WITH ADAPTER (GRAM) INHALATION
Refills: 0 | DISCHARGE

## 2023-12-08 NOTE — H&P PST ADULT - HISTORY OF PRESENT ILLNESS
67 year old female with hx of HIV (viral load undetectable), COVID ( about 2 years ago required hospital stay and monoclonal antibodies ) HTN, Asthma, Right Breast Cancer (s/p right lumpectomy with nodes, chemo and radiation) , Prolapse of vaginal vault after hysterectomy. Was using a pessary no longer likes it, desires surgery.       67 year old female with rectocele, PMH of HTN, HIV (viral load undetectable), COVID ( required hospital stay and monoclonal antibodies ) HTN, Asthma, Right Breast Cancer (s/p right lumpectomy with nodes, chemo and radiation), Prolapse of vaginal vault after hysterectomy. She has h/o midline cystocele and vaginal vault prolapse, S/P sacrocolpopexy on 05/2023. She reports bothersome vaginal bulge, denies leakage of urine or stool. She reports h/o constipation which is controlled with OTC regimen. She is now scheduled for Posterior colporraphy repair.  ?

## 2023-12-08 NOTE — H&P PST ADULT - OTHER CARE PROVIDERS
Timothy ([facility]) [phone] appt 5/16 Timothy ([facility]) [phone] appt 5/16

## 2023-12-08 NOTE — H&P PST ADULT - ASSESSMENT
DASI score: 8  DASI activity:  pt walks 20 blocks everyday to work   Loose teeth or denture:  partial upper denture

## 2023-12-08 NOTE — H&P PST ADULT - NSICDXPASTMEDICALHX_GEN_ALL_CORE_FT
PAST MEDICAL HISTORY:  2019 novel coronavirus disease (COVID-19)     Asthma     Benign hypertension     Breast cancer     Carcinoma of right breast treated with adjuvant chemotherapy     History of chemotherapy     HIV (human immunodeficiency virus infection)     Rectocele      PAST MEDICAL HISTORY:  2019 novel coronavirus disease (COVID-19)     Asthma     Benign hypertension     Breast cancer     Carcinoma of right breast treated with adjuvant chemotherapy     GERD (gastroesophageal reflux disease)     History of chemotherapy     HIV (human immunodeficiency virus infection)     Rectocele

## 2023-12-08 NOTE — H&P PST ADULT - WEIGHT IN LBS
Physical Therapy  Visit Type: initial evaluation  Precautions:  Medical precautions:  fall risk; standard precautions.  Abdominal incision precautions  Lines:     Basic: central line, urinary catheter, telemetry, O2, continuous pulse oximetry and NG    Complex: J.P. drain      Lines in chart and on patient reviewed, cautions maintained throughout session.  Safety Measures: bed alarm      SUBJECTIVE                                                                                                            Patient agreed to participate in therapy this date.    Patient is a 64 year old male admitted to [facility] for GI bleed. Patient underwent EGD 11/13 which revealed active bleeding from duodenal ulcer, had epi injection and clip placement.   General surgery consultation obtained and patient was taken for ex lap and pyloroplasty with oversewing of duodenal ulcer on 11/14.  Patient required 8 units PRBC.  Patient started on TPN on 11/15.    Pt lives with significant other in a mobile home with 3 steps entering.  At baseline, patient is Independent with functional mobility tasks using no assistive device most of time, occassionally uses walking stick due to Lt knee pain.    Patient / Family Goal: maximize function and return home      OBJECTIVE                                                                                                                Oriented to person, place and time     Affect/Behavior: alert, appropriate and cooperative  Patient activity tolerance: 1 to 1 activity to rest  Incision/Wound:   - Location: abdominal surgical incision   Range of Motion (measured in degrees unless otherwise noted, active unless indicated)  WFL: RLE, LLE  Strength (out of 5 unless otherwise indicated)   Comments / Details:  Bilateral hip strength grossly 4/5  Balance    Sitting: Static: independent, Dynamic: supervision    Standing - Firm Surface - Eyes Open: Static: contact guard/touching/steadying assist double upper  extremity support  Dynamic: contact guard/touching/steadying assist double upper extremity support  Bed Mobility:      Sit to supine: minimal assist and with verbal cues (for abdominal protection techniques)  Training completed:    Tasks: sit to supine    Education details: patient requires additional training  Transfers:    Assistive devices: 2-wheeled walker    Sit to stand: contact guard/touching/steadying assist and with verbal cues    Stand to sit: contact guard/touching/steadying assist and with verbal cues    Stand pivot: contact guard/touching/steadying assist, minimal assist and with verbal cues  Training completed:    Tasks: sit to stand, stand to sit and stand pivot    Education details: patient requires additional training  Gait/Ambulation:     Assistance: contact guard/touching/steadying assist   Assistive device: 2-wheeled walker    Distance (ft): 15    Type: decreased rosio    Swing phase: Left: decreased step length; Right: decreased step length  Training Completed:    Tasks: gait training on level surfaces    Education details: patient demonstrates understanding        Interventions                                                                                                       Training provided: activity tolerance, functional ambulation, transfer training, safety training and compensatory techniques    Skilled input: Verbal instruction/cues        ASSESSMENT                                                                                                                  Visit # since seen by PT:  0    Patient presents to physical therapy below baseline functional mobility level of independent.  Patient is demonstrating decreased strength, pain, decreased activity tolerance which is limiting the completion of all functional mobility at this time.  Further skilled physical therapy is required to address these limitations in attempt to maximize the patient's independence.          Discharge  Recommendations  Recommendation for Discharge: PT WI: Home, Home therapy             PT/OT Mobility Equipment for Discharge: TBD            Predicted patient presentation: Moderate (evolving) - Patient comorbidities and complexities, as defined above, may have varying impact on steady progress for prescribed plan of care.    End of Session:   Location: in bed  Safety measures: alarm system in place/re-engaged, lines intact and call light within reach  Handoff to: nurse            PLAN                                                                                                                            Suggestions for next session as indicated: Bed mobility training with abdominal precautions, sit to stand transfer training, gait training with 2WW, BLE strengthening exercises, activity tolerance progression    PT Frequency: 6 days/week         Interventions: bed mobility, compensatory technique education, endurance training, functional transfer training, gait training, safety education, stairs retraining, strengthening and patient/family training  Agreement to plan and goals: patient agrees with goals and treatment plan        GOALS:  Review Date: 11/22/2020  Long Term Goals: (to be met by time of discharge from hospital)  Maintain precautions: Patient able to maintain precautions independent.  Sit to supine: Patient will complete sit to supine modified independent.  Supine to sit: Patient will complete supine to sit modified independent.  Sit to stand: Patient will complete sit to stand transfer with 2-wheeled walker, modified independent.   Ambulation (even): Patient will ambulate on even surface for 100 feet with 2-wheeled walker, modified independent.   3-4 steps: Patient will ambulate 3-4 steps with using one rail, contact guard or touching/steadying.     Documented in the chart in the following areas: Prior Level of Function. Pain. Assessment. Patient Education.         162

## 2023-12-08 NOTE — H&P PST ADULT - EYES
A (Stent ORtg Gps Exprt 10mm .079in 40mm 120cm Otw) self-expanding bare metal  stent was deployed in the left iliac vein.   The stent was deployed at 4/23/2019 12:45 PM. PERRL/EOMI/conjunctiva clear/normal

## 2023-12-08 NOTE — H&P PST ADULT - PROBLEM SELECTOR PLAN 1
Plan  1. Stop all NSAIDS, herbal supplements and vitamins for 7 days.  2. NPO after 11PM the evening before surgery.  3. Take the following medications ( Amlodipine, Valsartan, Omeprazole ) with small sips of water on the morning of your procedure/surgery.  4. CBC, BMP, T & S done today Plan  1. Stop all NSAIDS, herbal supplements and vitamins for 7 days.  2. NPO after 11PM the evening before surgery.  3. Take the following medications ( Amlodipine, Valsartan, Omeprazole ) with small sips of water on the morning of your procedure/surgery.  4. CBC, BMP, T & S done today    Pt has Preop appointment with Dr. Keith on 12/21/23, urine culture will be done that day.

## 2023-12-21 ENCOUNTER — APPOINTMENT (OUTPATIENT)
Dept: UROGYNECOLOGY | Facility: CLINIC | Age: 68
End: 2023-12-21
Payer: MEDICARE

## 2023-12-21 VITALS
HEART RATE: 82 BPM | DIASTOLIC BLOOD PRESSURE: 80 MMHG | WEIGHT: 161 LBS | HEIGHT: 65 IN | BODY MASS INDEX: 26.82 KG/M2 | SYSTOLIC BLOOD PRESSURE: 132 MMHG

## 2023-12-21 DIAGNOSIS — N81.6 RECTOCELE: ICD-10-CM

## 2023-12-21 PROCEDURE — 99214 OFFICE O/P EST MOD 30 MIN: CPT

## 2023-12-21 NOTE — HISTORY OF PRESENT ILLNESS
[FreeTextEntry1] : Grayson is a 69 yo F with HIV well-controlled with medications, prior JAMEY and also SARAH MANUEL, GIO  who presents here for pre-surgical counseling to address her rectocele.  Patient denies any new complaints - continues to deny urinary incontinence and has well-controlled constipation with OTC regimen.  She continues to desire surgical management.  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

## 2023-12-21 NOTE — DISCUSSION/SUMMARY
[FreeTextEntry1] : Grayson is a 67 yo F with HIV well-controlled with medications, prior JAMEY and also [facility], GIO 05/23 who presents here for pre-surgical counseling to address her rectocele.  We reviewed treatment options for her prolapse and she continues to desire surgery.   We reviewed risks to the procedure including, but not limited to: bleeding, infection, pain, urinary retention requiring an indwelling catheter, persistence or recurrence of prolapse, failure of procedure to alleviate symptoms, bowel dysfunction including fecal incontinence, dyspareunia. We also extensively reviewed the risk of injury to the bladder, ureters, urethra, vagina, rectum, and bowel.    She was counseled on the elective nature of the surgery. She expressed understanding of these risks and continues to desire the planned surgical procedure. We reviewed preoperative and postoperative instructions.    Consent was signed in the office for examination under anesthesia, posterior repair. She is aware of learners participating in her care. All questions were answered.

## 2023-12-21 NOTE — PHYSICAL EXAM
[Chaperone Present] : A chaperone was present in the examining room during all aspects of the physical examination [Labia Majora] : were normal [Labia Minora] : were normal [Normal Appearance] : general appearance was normal [Uterine Adnexae] : were not tender and not enlarged [Normal] : no abnormalities [Exam Deferred] : was deferred [Scar] : a scar was noted [Absent] : absent [FreeTextEntry1] : General: Well appearing. Alert and oriented. No acute distress. HEENT: Normocephalic, atraumatic, extraocular muscles appear to be intact. Neck: Full range of motion, no obvious lymphadenopathy, deformities, or masses noted. Respiratory: Speaking in full sentences comfortably. Normal work of breathing. No cough during visit. Musculoskeletal: Active full range of motion in extremities. Skin: No obvious rash or skin lesions. Neuro: Oriented x3. Speech is fluent, normal rate. Psych: Normal mood and affect. [Tenderness] : ~T no ~M abdominal tenderness observed [Distended] : not distended [de-identified] : Aa -2, Ba -2, C -7, GH 3, PB 2, TVL 9, Ap +1 and Bp +1

## 2023-12-26 ENCOUNTER — NON-APPOINTMENT (OUTPATIENT)
Age: 68
End: 2023-12-26

## 2023-12-26 LAB — BACTERIA UR CULT: NORMAL

## 2023-12-27 ENCOUNTER — TRANSCRIPTION ENCOUNTER (OUTPATIENT)
Age: 68
End: 2023-12-27

## 2023-12-28 ENCOUNTER — OUTPATIENT (OUTPATIENT)
Dept: OUTPATIENT SERVICES | Facility: HOSPITAL | Age: 68
LOS: 1 days | End: 2023-12-28
Payer: MEDICARE

## 2023-12-28 ENCOUNTER — APPOINTMENT (OUTPATIENT)
Dept: UROGYNECOLOGY | Facility: HOSPITAL | Age: 68
End: 2023-12-28
Payer: MEDICARE

## 2023-12-28 ENCOUNTER — TRANSCRIPTION ENCOUNTER (OUTPATIENT)
Age: 68
End: 2023-12-28

## 2023-12-28 VITALS
DIASTOLIC BLOOD PRESSURE: 81 MMHG | WEIGHT: 162.04 LBS | OXYGEN SATURATION: 97 % | HEART RATE: 91 BPM | SYSTOLIC BLOOD PRESSURE: 145 MMHG | RESPIRATION RATE: 16 BRPM | HEIGHT: 65 IN | TEMPERATURE: 99 F

## 2023-12-28 VITALS
TEMPERATURE: 98 F | DIASTOLIC BLOOD PRESSURE: 68 MMHG | HEART RATE: 83 BPM | OXYGEN SATURATION: 100 % | SYSTOLIC BLOOD PRESSURE: 112 MMHG | RESPIRATION RATE: 16 BRPM

## 2023-12-28 DIAGNOSIS — N81.6 RECTOCELE: ICD-10-CM

## 2023-12-28 DIAGNOSIS — Z98.890 OTHER SPECIFIED POSTPROCEDURAL STATES: Chronic | ICD-10-CM

## 2023-12-28 DIAGNOSIS — Z98.89 OTHER SPECIFIED POSTPROCEDURAL STATES: Chronic | ICD-10-CM

## 2023-12-28 DIAGNOSIS — Z90.710 ACQUIRED ABSENCE OF BOTH CERVIX AND UTERUS: Chronic | ICD-10-CM

## 2023-12-28 PROCEDURE — 57250 REPAIR RECTUM & VAGINA: CPT

## 2023-12-28 PROCEDURE — 86901 BLOOD TYPING SEROLOGIC RH(D): CPT

## 2023-12-28 PROCEDURE — 86900 BLOOD TYPING SEROLOGIC ABO: CPT

## 2023-12-28 PROCEDURE — 86850 RBC ANTIBODY SCREEN: CPT

## 2023-12-28 RX ORDER — ONDANSETRON 8 MG/1
4 TABLET, FILM COATED ORAL ONCE
Refills: 0 | Status: DISCONTINUED | OUTPATIENT
Start: 2023-12-28 | End: 2023-12-28

## 2023-12-28 RX ORDER — IBUPROFEN 200 MG
1 TABLET ORAL
Qty: 15 | Refills: 0
Start: 2023-12-28 | End: 2024-01-01

## 2023-12-28 RX ORDER — SODIUM CHLORIDE 9 MG/ML
3 INJECTION INTRAMUSCULAR; INTRAVENOUS; SUBCUTANEOUS EVERY 8 HOURS
Refills: 0 | Status: DISCONTINUED | OUTPATIENT
Start: 2023-12-28 | End: 2023-12-28

## 2023-12-28 RX ORDER — HYDROMORPHONE HYDROCHLORIDE 2 MG/ML
0.5 INJECTION INTRAMUSCULAR; INTRAVENOUS; SUBCUTANEOUS
Refills: 0 | Status: DISCONTINUED | OUTPATIENT
Start: 2023-12-28 | End: 2023-12-28

## 2023-12-28 RX ORDER — DIAZEPAM 5 MG
1 TABLET ORAL
Qty: 10 | Refills: 0
Start: 2023-12-28 | End: 2024-01-01

## 2023-12-28 RX ORDER — LIDOCAINE HCL 20 MG/ML
0.2 VIAL (ML) INJECTION ONCE
Refills: 0 | Status: DISCONTINUED | OUTPATIENT
Start: 2023-12-28 | End: 2023-12-28

## 2023-12-28 RX ORDER — SODIUM CHLORIDE 9 MG/ML
1000 INJECTION, SOLUTION INTRAVENOUS
Refills: 0 | Status: DISCONTINUED | OUTPATIENT
Start: 2023-12-28 | End: 2023-12-28

## 2023-12-28 RX ADMIN — HYDROMORPHONE HYDROCHLORIDE 0.5 MILLIGRAM(S): 2 INJECTION INTRAMUSCULAR; INTRAVENOUS; SUBCUTANEOUS at 16:23

## 2023-12-28 RX ADMIN — HYDROMORPHONE HYDROCHLORIDE 0.5 MILLIGRAM(S): 2 INJECTION INTRAMUSCULAR; INTRAVENOUS; SUBCUTANEOUS at 15:25

## 2023-12-28 RX ADMIN — HYDROMORPHONE HYDROCHLORIDE 0.5 MILLIGRAM(S): 2 INJECTION INTRAMUSCULAR; INTRAVENOUS; SUBCUTANEOUS at 16:38

## 2023-12-28 RX ADMIN — HYDROMORPHONE HYDROCHLORIDE 0.5 MILLIGRAM(S): 2 INJECTION INTRAMUSCULAR; INTRAVENOUS; SUBCUTANEOUS at 15:40

## 2023-12-28 RX ADMIN — SODIUM CHLORIDE 3 MILLILITER(S): 9 INJECTION INTRAMUSCULAR; INTRAVENOUS; SUBCUTANEOUS at 11:43

## 2023-12-28 NOTE — CHART NOTE - NSCHARTNOTEFT_GEN_A_CORE
GYN POST-OP CHECK  MARYKETTLEY DAY STUVITZ    1955Allergies    No Known Allergies    Intolerances        S: Pt awake and resting in bed but easily awoken. Pt reports moderate abdominal pain but improving since Dilaudid.  Pt denies N/V, SOB, CP, palpitations. Tolerates clears.  Not OOB yet nor has attempted clears.     O:   T(C): 36.5 (12-28-23 @ 15:05), Max: 36.5 (12-28-23 @ 15:05)  HR: 84 (12-28-23 @ 17:20) (69 - 84)  BP: 133/79 (12-28-23 @ 17:20) (101/64 - 133/79)  RR: 17 (12-28-23 @ 17:20) (16 - 17)  SpO2: 100% (12-28-23 @ 17:20) (95% - 100%)  I&O's Summary    28 Dec 2023 07:01  -  28 Dec 2023 17:32  --------------------------------------------------------  IN: 345 mL / OUT: 750 mL / NET: -405 mL      Gen: NAD. A+Ox3.  CV: S1S2, RRR  Lungs: CTA B/L  Abd: +BS. soft, gently distended and appropriately tender.  : small vaginal bleeding. Vaginal packing removed - 60% saturated w/ pink serosanguinous blood. Rees draining clear urine.   Ext: PAS in place b/l.     · Operative Findings  large rectocele; ROSALVA without any evidence or suture or injury    A/P: 68y Female w/ pmhx HIV,  Breast Ca, Asthma, HTN, s/p JAMEY now POD#0 s/p posterior repair.   1. Neuro: Analgesia PRN. HYDROmorphone  Injectable 0.5 milliGRAM(s) IV Push every 10 minutes PRN  ondansetron Injectable 4 milliGRAM(s) IV Push once PRN  2. Card: Monitor VS.   3. Pulm: Incentive spirometer use.   4. GI: Advance to regular diet. Anti-emetics PRN.  5. : Trial of void performed. pt passed with 250cc out after 300 cc in.   6. Electrolytes: LR@75cc/hr.   7. DVT ppx w/ PAS while in bed. Early ambulation, initially with assistance then as tolerated.  8. Discharge from PACU when criteria met.   d/w GYN team.  ZEESHAN Meng

## 2023-12-28 NOTE — ASU DISCHARGE PLAN (ADULT/PEDIATRIC) - NS MD DC FALL RISK RISK
For information on Fall & Injury Prevention, visit: [email or web address] OR  [email or web address] OR  https://www.cdc.gov/steadi/patient.html For information on Fall & Injury Prevention, visit: [email or web address] OR  [email or web address]-injury OR  https://www.cdc.gov/steadi/patient.html

## 2023-12-28 NOTE — ASU PATIENT PROFILE, ADULT - FALL HARM RISK - UNIVERSAL INTERVENTIONS
Bed in lowest position, wheels locked, appropriate side rails in place/Call bell, personal items and telephone in reach/Instruct patient to call for assistance before getting out of bed or chair/Non-slip footwear when patient is out of bed/[place] to call system/Physically safe environment - no spills, clutter or unnecessary equipment/Purposeful Proactive Rounding/Room/bathroom lighting operational, light cord in reach Bed in lowest position, wheels locked, appropriate side rails in place/Call bell, personal items and telephone in reach/Instruct patient to call for assistance before getting out of bed or chair/Non-slip footwear when patient is out of bed/[place] to call system/Physically safe environment - no spills, clutter or unnecessary equipment/Purposeful Proactive Rounding/Room/bathroom lighting operational, light cord in reach

## 2023-12-28 NOTE — ASU DISCHARGE PLAN (ADULT/PEDIATRIC) - ASU DC SPECIAL INSTRUCTIONSFT
1) Take motrin 800 mg every 8 hours.  2) Take valium 5 mg twice daily as needed for spasms.  3) No submerging in water for 6 weeks.  You may shower beginning tomorrow.  4) No heavy lifting greater than 10 lbs for 6 weeks.  5) Nothing per vagina for 6 weeks.  7) No driving for 1 week.

## 2023-12-28 NOTE — ASU DISCHARGE PLAN (ADULT/PEDIATRIC) - CARE PROVIDER_API CALL
Marie Keith  Urogyn and Reconst Pelvic Surg  [address]  [place] [address]  Phone: [phone]  Fax: [phone]  Established Patient  Follow Up Time:    Marie Keith  Urogyn and Reconst Pelvic Surg  [address]  [place] [address]  Phone: [phone]  Fax: [phone]  Established Patient  Follow Up Time:

## 2024-01-09 ENCOUNTER — APPOINTMENT (OUTPATIENT)
Dept: UROGYNECOLOGY | Facility: CLINIC | Age: 69
End: 2024-01-09
Payer: MEDICARE

## 2024-01-09 PROBLEM — Z92.21 PERSONAL HISTORY OF ANTINEOPLASTIC CHEMOTHERAPY: Chronic | Status: ACTIVE | Noted: 2023-12-08

## 2024-01-09 PROBLEM — N81.6 RECTOCELE: Chronic | Status: ACTIVE | Noted: 2023-12-08

## 2024-01-09 PROCEDURE — 99024 POSTOP FOLLOW-UP VISIT: CPT

## 2024-01-09 NOTE — PHYSICAL EXAM
[Labia Majora] : were normal [Labia Minora] : were normal [Normal] : was normal [Normal Appearance] : general appearance was normal [No Acute Distress] : in no acute distress [Well developed] : well developed [Well Nourished] : ~L well nourished [Good Hygeine] : demonstrates good hygeine [Oriented x3] : oriented to person, place, and time [Normal Memory] : ~T memory was ~L unimpaired [Normal Mood/Affect] : mood and affect are normal [Warm and Dry] : was warm and dry to touch [Normal Gait] : gait was normal [No Bleeding] : there was no active vaginal bleeding [Discharge] : a  ~M vaginal discharge was present [Scant] : scant [[place]] : yellow [Thin] : thin [Post Void Residual ____ml] : post void residual was [unfilled] ml [Anxiety] : patient is not anxious [FreeTextEntry4] : Surgical site remains clean and intact; no redness, swelling or drainage noted.  [de-identified] : PVR measured with bladder scan

## 2024-01-09 NOTE — OBJECTIVE
[Post Void Residual ____ ml] : Post Void Residual was [unfilled] ml [Soft and Nontender] : soft and nontender [Clean, Dry, Intact] : Clean, Dry, Intact [Healing well] : healing well [No Masses or Tenderness] : no masses or tenderness

## 2024-01-09 NOTE — DISCUSSION/SUMMARY
[FreeTextEntry1] : Grayson is a 67 yo F with HIV well-controlled with medications, prior JAMEY and also RA- KALEB, GIO 05/23 who presents here for post-op check up after rectocele repair on 12/28/23.   # Rectocele Post-op: - Pt will continue to take Ibuprofen (MDD 2400mg) and Acetaminophen (MDD 4000mg) for pain as needed. - Pt will continue to maintain soft stools to avoid straining. - Signs and symptoms of infection reviewed - Nothing in the vaginal for 6 weeks post-op.  No tub baths or swimming.  No lifting over 10 pounds.  - Will RTO in 4 weeks for follow up or sooner if needed.   Patient agrees to call office with any questions or concerns, verbalized understanding.

## 2024-01-09 NOTE — SUBJECTIVE
[FreeTextEntry1] : No acute complaints. [FreeTextEntry7] : Incisional pain controlled with Motrin and Tylenol.  Last took 400mg Motrin this morning.  [FreeTextEntry6] : Normal.  No nausea or vomiting. [FreeTextEntry5] : No NANCY or UUI.  No burning with urination.  Feels empty after voiding. [FreeTextEntry4] : Normal daily BM.  Taking fiber supplements [FreeTextEntry3] : Normal [FreeTextEntry9] : Minimal vaginal discharge, no bleeding.

## 2024-02-01 NOTE — H&P PST ADULT - NS PRO LACT YNNA
**Patient is **Ensemble**    Called patient to schedule 2024 yearly pharmacist appointment to discuss medications for Diabetes Management Program.    Left message with family member to have patient return our call.     Yas Skelton CPhT  [facility]  Clinical Pharmacy   Department, toll free: [phone] Option #3      
**Patient is Ensemble **         2024 Annual Pharmacist Visit    Second attempt made to contact patient to schedule 2024 yearly pharmacist appointment to discuss medications for Diabetes Management Program.    Spoke to patient and appointment scheduled for 2.21.24 @ 10:00 AM.    For Pharmacy Admin Tracking Only    Program: [employer]  CPA in place:  No  Recommendation Provided To: Patient/Caregiver: 1 via Telephone  Intervention Detail: Scheduled Appointment  Intervention Accepted By: Patient/Caregiver: 1  Gap Closed?: Yes   Time Spent (min): 10      
no

## 2024-03-12 ENCOUNTER — APPOINTMENT (OUTPATIENT)
Dept: UROGYNECOLOGY | Facility: CLINIC | Age: 69
End: 2024-03-12
Payer: MEDICARE

## 2024-03-12 DIAGNOSIS — Z98.890 OTHER SPECIFIED POSTPROCEDURAL STATES: ICD-10-CM

## 2024-03-12 PROCEDURE — 99024 POSTOP FOLLOW-UP VISIT: CPT

## 2024-03-12 NOTE — OBJECTIVE
[Clean, Dry, Intact] : Clean, Dry, Intact [Soft and Nontender] : soft and nontender [Good Support] : Good support [Healing well] : healing well [No Masses or Tenderness] : no masses or tenderness [FreeTextEntry3] : No evidence of recurrent prolapse

## 2024-03-12 NOTE — SUBJECTIVE
[FreeTextEntry1] : Feels generally well.  Does feel like she has bulge symptoms again. [FreeTextEntry6] : Eating well. [FreeTextEntry5] : Denies NANCY.  Denies urinary urgency, frequency, or dysuria. [FreeTextEntry4] : Having daily BMs without issue. [FreeTextEntry3] : Ambulating daily without issue.

## 2024-03-12 NOTE — DISCUSSION/SUMMARY
[FreeTextEntry1] : Grayson is a 68F s/p rectocele repair (12/28/23), doing well.  Discussed that patient no longer has any postoperative restrictions.  Discussed that there is no evidence recurrent POP on exam.  Patient counseled on PFPT to prevent recurrent POP; she elects to do home kegel exercises (handout provided).  She may follow up as needed. Patient verbalized understanding.  All questions answered.

## 2024-05-01 RX ORDER — BENRALIZUMAB 30 MG/ML
30 INJECTION, SOLUTION SUBCUTANEOUS
Qty: 1 | Refills: 5 | Status: ACTIVE | COMMUNITY
Start: 2023-03-23 | End: 1900-01-01

## 2024-05-01 RX ORDER — FLUTICASONE FUROATE AND VILANTEROL TRIFENATATE 100; 25 UG/1; UG/1
100-25 POWDER RESPIRATORY (INHALATION)
Qty: 1 | Refills: 3 | Status: ACTIVE | COMMUNITY
Start: 2022-12-23 | End: 1900-01-01

## 2024-05-01 RX ORDER — ALBUTEROL SULFATE 90 UG/1
108 (90 BASE) INHALANT RESPIRATORY (INHALATION)
Qty: 1 | Refills: 10 | Status: ACTIVE | COMMUNITY
Start: 2017-11-16 | End: 1900-01-01

## 2025-02-20 NOTE — ASSESSMENT
Patient would like communication of their results via:      Home Phone: [phone] (home)  Okay to leave a message containing results? Yes     [FreeTextEntry1] : Today her asthma appears to be quiescent. She is being maintained on daily Breo in addition to her rescue inhalers. I asked her to make an appointment for May 16 me to review with her what I wanted her to do prior to her gynecologic surgery. She will require prednisone preoperatively and for a very brief time postoperatively

## (undated) DEVICE — PREP CHLORAPREP HI-LITE ORANGE 26ML

## (undated) DEVICE — FOLEY TRAY 16FR LF URINE METER SURESTEP

## (undated) DEVICE — POSITIONER PINK PAD PIGAZZI SYSTEM

## (undated) DEVICE — DRSG STERISTRIPS 0.5 X 4"

## (undated) DEVICE — SHEARS COVIDIEN ENDO SHEAR 5MM X 31CM W UNIPOLAR CAUTERY

## (undated) DEVICE — XI ARM NEEDLE DRIVER SUTURECUT MEGA 8MM

## (undated) DEVICE — SUT BIOSYN 4-0 18" P-12

## (undated) DEVICE — VISITEC 4X4

## (undated) DEVICE — XI ARM DISSECTOR CURVED BIPOLAR 8MM

## (undated) DEVICE — LAP PAD 18 X 18"

## (undated) DEVICE — FOLEY CATH 2-WAY 18FR 5CC SILICONE

## (undated) DEVICE — FOLEY CATH 2-WAY 18FR 5CC LATEX HYDROGEL

## (undated) DEVICE — TROCAR APPLIED MEDICAL KII BALLOON BLUNT TIP 12MM X 100MM

## (undated) DEVICE — SUT POLYSORB 2-0 30" V-20 UNDYED

## (undated) DEVICE — NDL HYPO REGULAR BEVEL 22G X 1.5" (TURQUOISE)

## (undated) DEVICE — FOLEY TRAY 16FR 5CC LTX UMETER CLOSED

## (undated) DEVICE — XI ARM NEEDLE DRIVER LARGE

## (undated) DEVICE — VENODYNE/SCD SLEEVE CALF LARGE

## (undated) DEVICE — D HELP - CLEARVIEW CLEARIFY SYSTEM

## (undated) DEVICE — TUBING SUCTION 20FT

## (undated) DEVICE — GLV 7 PROTEXIS (WHITE)

## (undated) DEVICE — LUBRICANT INST ELECTROLUBE Z SOLUTION

## (undated) DEVICE — SUT POLYSORB 3-0 30" V-20 UNDYED

## (undated) DEVICE — SUCTION YANKAUER NO CONTROL VENT

## (undated) DEVICE — DRSG DERMABOND 0.7ML

## (undated) DEVICE — ELCTR BOVIE PENCIL SMOKE EVACUATION

## (undated) DEVICE — GLV 6.5 PROTEXIS (WHITE)

## (undated) DEVICE — POSITIONER FOAM EGG CRATE ULNAR 2PCS (PINK)

## (undated) DEVICE — UTERINE MANIPULATOR CONMED VCARE MED 34MM

## (undated) DEVICE — XI ARM SCISSOR MONO CURVED

## (undated) DEVICE — DRAPE TOWEL BLUE 17" X 24"

## (undated) DEVICE — XI OBTURATOR OPTICAL BLADELESS 8MM

## (undated) DEVICE — DRAPE GYN W LEGGINGS 3X125"

## (undated) DEVICE — DRAPE 3/4 SHEET W REINFORCEMENT 56X77"

## (undated) DEVICE — MEDICATION LABELS W MARKER

## (undated) DEVICE — SUT POLYSORB 0 30" GS-21 UNDYED

## (undated) DEVICE — XI DRAPE COLUMN

## (undated) DEVICE — PREP BETADINE KIT

## (undated) DEVICE — BLADE SCALPEL SAFETYLOCK #15

## (undated) DEVICE — BLADE SCALPEL SAFETYLOCK #11

## (undated) DEVICE — SYR ASEPTO

## (undated) DEVICE — LONE STAR RETRACTOR RING 32.5CM X 18.3CM DISP

## (undated) DEVICE — XI ARM FORCEP MARYLAND BIPOLAR

## (undated) DEVICE — DRAPE MAYO STAND 30"

## (undated) DEVICE — STAPLER SKIN VISI-STAT 35 WIDE

## (undated) DEVICE — POSITIONER PURPLE ARM ONE STEP (LARGE)

## (undated) DEVICE — PACK GYN LAPAROSCOPY

## (undated) DEVICE — XI ARM FORCEP TENACULUM

## (undated) DEVICE — TROCAR SURGIQUEST AIRSEAL 8MMX100MM

## (undated) DEVICE — XI ARM GRASPER TIP UP FENESTRATED

## (undated) DEVICE — UTERINE MANIPULATOR CONMED VCARE SM 32MM

## (undated) DEVICE — LAP PAD 4 X 18"

## (undated) DEVICE — GLV 7.5 PROTEXIS (WHITE)

## (undated) DEVICE — NDL HYPO SAFE 22G X 1.5" (BLACK)

## (undated) DEVICE — XI ARM CLIP APPLIER LARGE

## (undated) DEVICE — XI TIP COVER

## (undated) DEVICE — GLV 8.5 PROTEXIS (WHITE)

## (undated) DEVICE — SOL IRR POUR NS 0.9% 500ML

## (undated) DEVICE — WARMING BLANKET UPPER ADULT

## (undated) DEVICE — BLADE SCALPEL SAFETYLOCK #10

## (undated) DEVICE — GLV 8 PROTEXIS (WHITE)

## (undated) DEVICE — SOL INJ NS 0.9% 100ML

## (undated) DEVICE — INSUFFLATION NDL COVIDIEN STEP 14G FOR STEP/VERSASTEP

## (undated) DEVICE — DRAPE INSTRUMENT POUCH 6.75" X 11"

## (undated) DEVICE — SYR LUER LOK 20CC

## (undated) DEVICE — GOWN TRIMAX LG

## (undated) DEVICE — XI ARM FORCEP FENESTRATED BIPOLAR 8MM

## (undated) DEVICE — ENDOCATCH 10MM SPECIMEN POUCH

## (undated) DEVICE — TUBING STRYKEFLOW II SUCTION / IRRIGATOR

## (undated) DEVICE — DRSG OPSITE 2.5 X 2"

## (undated) DEVICE — PREP BETADINE SPONGE STICKS

## (undated) DEVICE — PREP CHLOROHEXIDINE 4% 118CC KIT

## (undated) DEVICE — XI SEAL UNIV 5- 8 MM

## (undated) DEVICE — FOLEY CATH 3-WAY 18FR 30ML DOVER HYDROGEL COATED LATEX

## (undated) DEVICE — SPECIMEN CONTAINER 100ML

## (undated) DEVICE — SOL IRR POUR H2O 250ML

## (undated) DEVICE — SUT BIOSYN 2-0 27" V-20

## (undated) DEVICE — SUT VLOC 180 2-0 6" GS-22 GREEN

## (undated) DEVICE — LONE STAR ELASTIC STAY HOOK 5MM SHARP

## (undated) DEVICE — TUBING AIRSEAL TRI-LUMEN FILTERED

## (undated) DEVICE — MARKING PEN W RULER

## (undated) DEVICE — NDL COUNTER FOAM AND MAGNET 40-70

## (undated) DEVICE — SUT GORETEX CV-2 (0) 36" PH-24

## (undated) DEVICE — SUT SOFSILK 2-0 30" V-20

## (undated) DEVICE — SUT CLIP LAPRA-TY ABSORBABLE SIZE 0.118 TO 0.12" VIOLET

## (undated) DEVICE — XI ARM NEEDLE DRIVER MEGA

## (undated) DEVICE — SYR LUER LOK 10CC

## (undated) DEVICE — SOL INJ NS 0.9% 1000ML

## (undated) DEVICE — DRAPE 1/2 SHEET 40X57"

## (undated) DEVICE — XI ARM FORCEP PROGRASP 8MM

## (undated) DEVICE — TUBING TUR 2 PRONG

## (undated) DEVICE — UTERINE MANIPULATOR CONMED VCARE LG 37MM

## (undated) DEVICE — SUT POLYSORB 0 30" GS-23

## (undated) DEVICE — DRAPE LIGHT HANDLE COVER (BLUE)

## (undated) DEVICE — XI DRAPE ARM